# Patient Record
Sex: FEMALE | Race: WHITE | Employment: OTHER | URBAN - METROPOLITAN AREA
[De-identification: names, ages, dates, MRNs, and addresses within clinical notes are randomized per-mention and may not be internally consistent; named-entity substitution may affect disease eponyms.]

---

## 2017-03-27 ENCOUNTER — OFFICE VISIT (OUTPATIENT)
Dept: PHYSICAL THERAPY | Age: 72
End: 2017-03-27
Attending: ORTHOPAEDIC SURGERY
Payer: MEDICARE

## 2017-03-27 DIAGNOSIS — C40.22 MALIGNANT NEOPLASM OF LONG BONE OF LEFT LOWER EXTREMITY (HCC): Primary | ICD-10-CM

## 2017-03-27 PROCEDURE — 97163 PT EVAL HIGH COMPLEX 45 MIN: CPT | Performed by: PHYSICAL THERAPIST

## 2017-03-27 NOTE — PROGRESS NOTES
LOWER EXTREMITY EVALUATION:   Referring Physician: Dr. Abbie Moreira  Date of Onset: Dec 8 2016 hip replacement  Date of Service: 3/27/2017   L THR    PATIENT SUMMARY:   Arlyn Thapa is a 73year old y/o female who presents to therapy today with complaints of 0    DF: R 5; L 8  PF unable to assess R 4/5     PROM:  Hip Knee Foot/Ankle   Flexion: R 120; L 45  Extension: NT   Flexion: R 135; L 60  Extension: R +2; L 0    NT         Flexibility:   Hip Flexor: R NT, L NT  HS/Piriformis NT  Quads: L > R  Gastroc: L > questions, please contact me at Dept: [phone]    Sincerely,  Electronically signed by therapist: Hayley Crain PT    [de-identified] certification required: Yes  I certify the need for these services furnished under this plan of treatment and while un

## 2017-03-29 ENCOUNTER — OFFICE VISIT (OUTPATIENT)
Dept: PHYSICAL THERAPY | Age: 72
End: 2017-03-29
Attending: ORTHOPAEDIC SURGERY
Payer: MEDICARE

## 2017-03-29 PROCEDURE — 97110 THERAPEUTIC EXERCISES: CPT | Performed by: PHYSICAL THERAPIST

## 2017-03-29 NOTE — PROGRESS NOTES
L THR   Authorized # of Visits:  2         Next MD visit: none scheduled  Fall Risk: standard         Precautions: n/a           Medication Changes since last visit?: No  Subjective: Patient reports her knee feels better.   No problems with start of the exe

## 2017-03-30 ENCOUNTER — OFFICE VISIT (OUTPATIENT)
Dept: PHYSICAL THERAPY | Age: 72
End: 2017-03-30
Attending: ORTHOPAEDIC SURGERY
Payer: MEDICARE

## 2017-03-30 PROCEDURE — 97110 THERAPEUTIC EXERCISES: CPT | Performed by: PHYSICAL THERAPIST

## 2017-03-30 NOTE — PROGRESS NOTES
L THR   Authorized # of Visits:  3/10         Next MD visit: none scheduled  Fall Risk: standard         Precautions: n/a           Medication Changes since last visit?: No  Subjective: Patient reports increase in pain yesterday to 7-8/10.   States today 1-

## 2017-04-03 ENCOUNTER — OFFICE VISIT (OUTPATIENT)
Dept: PHYSICAL THERAPY | Age: 72
End: 2017-04-03
Attending: ORTHOPAEDIC SURGERY
Payer: MEDICARE

## 2017-04-03 PROCEDURE — 97110 THERAPEUTIC EXERCISES: CPT | Performed by: PHYSICAL THERAPIST

## 2017-04-03 NOTE — PROGRESS NOTES
L THR   Authorized # of Visits:  4/10         Next MD visit: none scheduled  Fall Risk: standard         Precautions: n/a           Medication Changes since last visit?: No  Subjective: Patient reports pain increase to 4/10 only after last session, today 2

## 2017-04-04 ENCOUNTER — OFFICE VISIT (OUTPATIENT)
Dept: PHYSICAL THERAPY | Age: 72
End: 2017-04-04
Attending: ORTHOPAEDIC SURGERY
Payer: MEDICARE

## 2017-04-04 PROCEDURE — 97110 THERAPEUTIC EXERCISES: CPT | Performed by: PHYSICAL THERAPIST

## 2017-04-04 NOTE — PROGRESS NOTES
L THR   Authorized # of Visits:  5/10         Next MD visit: none scheduled  Fall Risk: standard         Precautions: n/a           Medication Changes since last visit?: No  Subjective: Patient reports pain increase to 3-4/10 after last session.   States no

## 2017-04-06 ENCOUNTER — OFFICE VISIT (OUTPATIENT)
Dept: PHYSICAL THERAPY | Age: 72
End: 2017-04-06
Attending: ORTHOPAEDIC SURGERY
Payer: MEDICARE

## 2017-04-06 PROCEDURE — 97110 THERAPEUTIC EXERCISES: CPT | Performed by: PHYSICAL THERAPIST

## 2017-04-06 NOTE — PROGRESS NOTES
L THR   Authorized # of Visits:  6/10         Next MD visit: none scheduled  Fall Risk: standard         Precautions: n/a           Medication Changes since last visit?: No  Subjective: Patient reports stiffness this morning.            Objective:     3/30/

## 2017-04-11 ENCOUNTER — OFFICE VISIT (OUTPATIENT)
Dept: PHYSICAL THERAPY | Age: 72
End: 2017-04-11
Attending: ORTHOPAEDIC SURGERY
Payer: MEDICARE

## 2017-04-11 PROCEDURE — 97110 THERAPEUTIC EXERCISES: CPT | Performed by: PHYSICAL THERAPIST

## 2017-04-11 NOTE — PROGRESS NOTES
L THR   Authorized # of Visits:  7/10         Next MD visit: none scheduled  Fall Risk: standard         Precautions: n/a           Medication Changes since last visit?: No  Subjective: Patient reports did a little stretching on her back.             Object

## 2017-04-12 ENCOUNTER — OFFICE VISIT (OUTPATIENT)
Dept: PHYSICAL THERAPY | Age: 72
End: 2017-04-12
Attending: ORTHOPAEDIC SURGERY
Payer: MEDICARE

## 2017-04-12 PROCEDURE — 97110 THERAPEUTIC EXERCISES: CPT | Performed by: PHYSICAL THERAPIST

## 2017-04-12 NOTE — PROGRESS NOTES
L THR   Authorized # of Visits:  8/10         Next MD visit: none scheduled  Fall Risk: standard         Precautions: n/a           Medication Changes since last visit?: No  Subjective: Patient reports she had no pain sitting in the car after last session.

## 2017-04-14 ENCOUNTER — OFFICE VISIT (OUTPATIENT)
Dept: PHYSICAL THERAPY | Age: 72
End: 2017-04-14
Attending: ORTHOPAEDIC SURGERY
Payer: MEDICARE

## 2017-04-14 PROCEDURE — 97110 THERAPEUTIC EXERCISES: CPT | Performed by: PHYSICAL THERAPIST

## 2017-04-14 NOTE — PROGRESS NOTES
L THR   Authorized # of Visits:  9/10         Next MD visit: none scheduled  Fall Risk: standard         Precautions: n/a           Medication Changes since last visit?: No  Subjective: Patient reports she has been doing the exercises at home.            Ob

## 2017-04-17 ENCOUNTER — OFFICE VISIT (OUTPATIENT)
Dept: PHYSICAL THERAPY | Age: 72
End: 2017-04-17
Attending: ORTHOPAEDIC SURGERY
Payer: MEDICARE

## 2017-04-17 PROCEDURE — 97110 THERAPEUTIC EXERCISES: CPT | Performed by: PHYSICAL THERAPIST

## 2017-04-17 NOTE — PROGRESS NOTES
L THR   Authorized # of Visits:  10/10         Next MD visit: none scheduled  Fall Risk: standard         Precautions: n/a           Medication Changes since last visit?: No   10 visit Update:   Subjective: Patient reports easier to get in and out of car a strengthening, gait pattern    Charges: Ex 3       Total Timed Treatment: 45 min  Total Treatment Time: 45 min

## 2017-04-18 ENCOUNTER — OFFICE VISIT (OUTPATIENT)
Dept: PHYSICAL THERAPY | Age: 72
End: 2017-04-18
Attending: ORTHOPAEDIC SURGERY
Payer: MEDICARE

## 2017-04-18 PROCEDURE — 97110 THERAPEUTIC EXERCISES: CPT | Performed by: PHYSICAL THERAPIST

## 2017-04-18 NOTE — PROGRESS NOTES
L THR   Authorized # of Visits:  11/10         Next MD visit: none scheduled  Fall Risk: standard         Precautions: n/a           Medication Changes since last visit?: No     Subjective: Patient reports she was doing a lot of the exercises yesterday.

## 2017-04-20 ENCOUNTER — OFFICE VISIT (OUTPATIENT)
Dept: PHYSICAL THERAPY | Age: 72
End: 2017-04-20
Attending: ORTHOPAEDIC SURGERY
Payer: MEDICARE

## 2017-04-20 PROCEDURE — 97110 THERAPEUTIC EXERCISES: CPT | Performed by: PHYSICAL THERAPIST

## 2017-04-20 PROCEDURE — 97035 APP MDLTY 1+ULTRASOUND EA 15: CPT | Performed by: PHYSICAL THERAPIST

## 2017-04-20 NOTE — PROGRESS NOTES
L THR   Authorized # of Visits:  12/20         Next MD visit: none scheduled  Fall Risk: standard         Precautions: n/a           Medication Changes since last visit?: No     Subjective: Patient reports continues doing the exercises at home.         Dulce Paz

## 2017-04-24 ENCOUNTER — APPOINTMENT (OUTPATIENT)
Dept: PHYSICAL THERAPY | Age: 72
End: 2017-04-24
Attending: ORTHOPAEDIC SURGERY
Payer: MEDICARE

## 2017-04-26 ENCOUNTER — OFFICE VISIT (OUTPATIENT)
Dept: PHYSICAL THERAPY | Age: 72
End: 2017-04-26
Attending: ORTHOPAEDIC SURGERY
Payer: MEDICARE

## 2017-04-26 PROCEDURE — 97110 THERAPEUTIC EXERCISES: CPT | Performed by: PHYSICAL THERAPIST

## 2017-04-26 NOTE — PROGRESS NOTES
L THR   Authorized # of Visits:  13/20         Next MD visit: none scheduled  Fall Risk: standard         Precautions: n/a           Medication Changes since last visit?: No     Subjective: Patient reports some soreness today, seems like weather affects he

## 2017-04-27 ENCOUNTER — OFFICE VISIT (OUTPATIENT)
Dept: PHYSICAL THERAPY | Age: 72
End: 2017-04-27
Attending: ORTHOPAEDIC SURGERY
Payer: MEDICARE

## 2017-04-27 PROCEDURE — 97110 THERAPEUTIC EXERCISES: CPT | Performed by: PHYSICAL THERAPIST

## 2017-04-27 NOTE — PROGRESS NOTES
L THR   Authorized # of Visits:  13/20         Next MD visit: none scheduled  Fall Risk: standard         Precautions: n/a           Medication Changes since last visit?: No     Subjective: Patient reports some achiness today.        Objective:   PROM knee

## 2017-05-01 ENCOUNTER — OFFICE VISIT (OUTPATIENT)
Dept: PHYSICAL THERAPY | Age: 72
End: 2017-05-01
Attending: ORTHOPAEDIC SURGERY
Payer: MEDICARE

## 2017-05-01 PROCEDURE — 97110 THERAPEUTIC EXERCISES: CPT | Performed by: PHYSICAL THERAPIST

## 2017-05-01 NOTE — PROGRESS NOTES
L THR   Authorized # of Visits:  14/20         Next MD visit: none scheduled  Fall Risk: standard         Precautions: n/a           Medication Changes since last visit?: No     Subjective: Patient states her L hip is very tight today and says it may be du

## 2017-05-03 ENCOUNTER — OFFICE VISIT (OUTPATIENT)
Dept: PHYSICAL THERAPY | Age: 72
End: 2017-05-03
Attending: ORTHOPAEDIC SURGERY
Payer: MEDICARE

## 2017-05-03 PROCEDURE — 97110 THERAPEUTIC EXERCISES: CPT | Performed by: PHYSICAL THERAPIST

## 2017-05-03 NOTE — PROGRESS NOTES
L THR   Authorized # of Visits:  15/20         Next MD visit: none scheduled  Fall Risk: standard         Precautions: n/a           Medication Changes since last visit?: No     Subjective: Patient states the hip was painful just last night before bed at 8

## 2017-05-04 ENCOUNTER — OFFICE VISIT (OUTPATIENT)
Dept: PHYSICAL THERAPY | Age: 72
End: 2017-05-04
Attending: ORTHOPAEDIC SURGERY
Payer: MEDICARE

## 2017-05-04 PROCEDURE — 97110 THERAPEUTIC EXERCISES: CPT | Performed by: PHYSICAL THERAPIST

## 2017-05-04 NOTE — PROGRESS NOTES
L THR   Authorized # of Visits:  16/20         Next MD visit: none scheduled  Fall Risk: standard         Precautions: n/a           Medication Changes since last visit?: No     Subjective: Pt says her hip pain is mild today but hurts more in the front.

## 2017-05-11 ENCOUNTER — OFFICE VISIT (OUTPATIENT)
Dept: PHYSICAL THERAPY | Age: 72
End: 2017-05-11
Attending: ORTHOPAEDIC SURGERY
Payer: MEDICARE

## 2017-05-11 PROCEDURE — 97110 THERAPEUTIC EXERCISES: CPT

## 2017-05-11 NOTE — PROGRESS NOTES
L THR   Authorized # of Visits:  17/20         Next MD visit: 5/22/17   Fall Risk: standard         Precautions: n/a           Medication Changes since last visit?: No     Subjective: Pt says she felt her knee pop while doing exercises at home and felt her daily activities. Plan: Continue, advance as able and slowly increase amount of stretching and strengthening.      Charges: Ex 3      Total Timed Treatment: 45 min  Total Treatment Time: 60 min

## 2017-05-12 ENCOUNTER — OFFICE VISIT (OUTPATIENT)
Dept: PHYSICAL THERAPY | Age: 72
End: 2017-05-12
Attending: ORTHOPAEDIC SURGERY
Payer: MEDICARE

## 2017-05-12 PROCEDURE — 97110 THERAPEUTIC EXERCISES: CPT | Performed by: PHYSICAL THERAPIST

## 2017-05-12 NOTE — PROGRESS NOTES
L THR   Authorized # of Visits:  17/20         Next MD visit: none scheduled  Fall Risk: standard         Precautions: n/a           Medication Changes since last visit?: No     Subjective: Pt reports she was tired yesterday but today ready to go.      Kevin Suarez

## 2017-05-15 ENCOUNTER — OFFICE VISIT (OUTPATIENT)
Dept: PHYSICAL THERAPY | Age: 72
End: 2017-05-15
Attending: ORTHOPAEDIC SURGERY
Payer: MEDICARE

## 2017-05-15 PROCEDURE — 97110 THERAPEUTIC EXERCISES: CPT | Performed by: PHYSICAL THERAPIST

## 2017-05-15 NOTE — PROGRESS NOTES
L THR   Authorized # of Visits:  18/20         Next MD visit: none scheduled  Fall Risk: standard         Precautions: n/a           Medication Changes since last visit?: No     Subjective: Pt reports she fell yesterday as she was kicking off her shoe.  She Total Timed Treatment: 55 min  Total Treatment Time: 60 min

## 2017-05-18 ENCOUNTER — OFFICE VISIT (OUTPATIENT)
Dept: PHYSICAL THERAPY | Age: 72
End: 2017-05-18
Attending: ORTHOPAEDIC SURGERY
Payer: MEDICARE

## 2017-05-18 PROCEDURE — 97110 THERAPEUTIC EXERCISES: CPT | Performed by: PHYSICAL THERAPIST

## 2017-05-18 NOTE — PROGRESS NOTES
Patient Name: Fly De Anda, : 10/10/1945, MRN: [id number]   Date:  2017  Referring Physician:  Orville Galeas    Diagnosis: Hip replacement  Progress Summary    L THR   Authorized # of Visits:           Next MD visit: none scheduled  Fall • Therapy Goals      1. Patient independent with HEP- MET  2. Patient to have full ROM L to R knee to improve gait pattern- Improved ROM  3. Patient to have 90 deg hip flexion AROM. Improved  4. Patient to walk with use of cane or no assistive device.  -

## 2017-05-23 ENCOUNTER — OFFICE VISIT (OUTPATIENT)
Dept: PHYSICAL THERAPY | Age: 72
End: 2017-05-23
Attending: ORTHOPAEDIC SURGERY
Payer: MEDICARE

## 2017-05-23 PROCEDURE — 97110 THERAPEUTIC EXERCISES: CPT

## 2017-05-23 NOTE — PROGRESS NOTES
L THR   Authorized # of Visits:  19/20         Next MD visit: none scheduled  Fall Risk: standard         Precautions: n/a           Medication Changes since last visit?: No     Subjective: Pt reports the physician said everything is progressing as expecte

## 2017-05-24 ENCOUNTER — APPOINTMENT (OUTPATIENT)
Dept: PHYSICAL THERAPY | Age: 72
End: 2017-05-24
Attending: ORTHOPAEDIC SURGERY
Payer: MEDICARE

## 2017-05-26 ENCOUNTER — OFFICE VISIT (OUTPATIENT)
Dept: PHYSICAL THERAPY | Age: 72
End: 2017-05-26
Attending: ORTHOPAEDIC SURGERY
Payer: MEDICARE

## 2017-05-26 PROCEDURE — 97110 THERAPEUTIC EXERCISES: CPT | Performed by: PHYSICAL THERAPIST

## 2017-05-26 NOTE — PROGRESS NOTES
L THR   Authorized # of Visits:  20/20         Next MD visit: none scheduled  Fall Risk: standard         Precautions: n/a           Medication Changes since last visit?: No   10 visit update:     Subjective: Patient reports she is now able to sit on toile pattern  3. Patient to have 90 deg hip flexion AROM. 4. Patient to walk with use of cane or no assistive device. 5. Patient to report no pain with daily activities.             Plan: Continue with ROM, strengthening, gait work, balance , advance HEP as

## 2017-05-30 ENCOUNTER — OFFICE VISIT (OUTPATIENT)
Dept: PHYSICAL THERAPY | Age: 72
End: 2017-05-30
Attending: ORTHOPAEDIC SURGERY
Payer: MEDICARE

## 2017-05-30 PROCEDURE — 97110 THERAPEUTIC EXERCISES: CPT | Performed by: PHYSICAL THERAPIST

## 2017-05-30 PROCEDURE — 97140 MANUAL THERAPY 1/> REGIONS: CPT | Performed by: PHYSICAL THERAPIST

## 2017-05-30 NOTE — PROGRESS NOTES
L THR   Authorized # of Visits:  21         Next MD visit: none scheduled  Fall Risk: standard         Precautions: n/a           Medication Changes since last visit?: No   10 visit update:     Subjective: Patient reports she is now able to get into the fr able,       Charges:  TherEx 2, MM1     Total Timed Treatment: 45 min  Total Treatment Time: 45 min

## 2017-05-31 ENCOUNTER — APPOINTMENT (OUTPATIENT)
Dept: PHYSICAL THERAPY | Age: 72
End: 2017-05-31
Attending: ORTHOPAEDIC SURGERY
Payer: MEDICARE

## 2017-06-01 ENCOUNTER — OFFICE VISIT (OUTPATIENT)
Dept: PHYSICAL THERAPY | Age: 72
End: 2017-06-01
Attending: ORTHOPAEDIC SURGERY
Payer: MEDICARE

## 2017-06-01 PROCEDURE — 97140 MANUAL THERAPY 1/> REGIONS: CPT | Performed by: PHYSICAL THERAPIST

## 2017-06-01 PROCEDURE — 97110 THERAPEUTIC EXERCISES: CPT | Performed by: PHYSICAL THERAPIST

## 2017-06-06 ENCOUNTER — OFFICE VISIT (OUTPATIENT)
Dept: PHYSICAL THERAPY | Age: 72
End: 2017-06-06
Attending: ORTHOPAEDIC SURGERY
Payer: MEDICARE

## 2017-06-06 PROCEDURE — 97110 THERAPEUTIC EXERCISES: CPT

## 2017-06-06 NOTE — PROGRESS NOTES
Dx:HENRY Dec 8.2016                    Next MD visit: none scheduled  Fall Risk: standard         Precautions: n/a           Medications: Yes/no: no   Subjective: Pt states that she was sore after the last visits, more of soreness as she feels like she did independent with HEP  2. Patient to have full ROM L to R knee to improve gait pattern  3. Patient to have 90 deg hip flexion AROM. 4. Patient to walk with use of cane or no assistive device. 5. Patient to report no pain with daily activities.

## 2017-06-09 ENCOUNTER — OFFICE VISIT (OUTPATIENT)
Dept: PHYSICAL THERAPY | Age: 72
End: 2017-06-09
Attending: ORTHOPAEDIC SURGERY
Payer: MEDICARE

## 2017-06-09 PROCEDURE — 97110 THERAPEUTIC EXERCISES: CPT

## 2017-06-09 NOTE — PROGRESS NOTES
Dx:LTJOSE Dec 8.2016                    Next MD visit: none scheduled  Fall Risk: standard         Precautions: n/a           Medications: Yes/no: no   Subjective: Pt states that still has soreness on the hip but nothing unusaul.  She continues to report doin flex hip and knee for efficiency of transfers. She also has decreased overall /global strength on LLE and still depends on SC due to gait devitaions.  Sitting still asymmetrical as she tends not to WB evenly on BL gluteal area  Goals     • Therapy Goals

## 2017-06-13 ENCOUNTER — OFFICE VISIT (OUTPATIENT)
Dept: PHYSICAL THERAPY | Age: 72
End: 2017-06-13
Attending: ORTHOPAEDIC SURGERY
Payer: MEDICARE

## 2017-06-13 PROCEDURE — 97110 THERAPEUTIC EXERCISES: CPT

## 2017-06-13 NOTE — PROGRESS NOTES
Dx:HENRY Dec 8.2016                    Next MD visit: none scheduled  Fall Risk: standard         Precautions: n/a           Medications: Yes/no: no   Subjective: Pt states that she feels the same since last session. She relates that she has been trying to s Bhip encouraged on L side with assistance  Gait discussion Seated with WB on LLE, knee flexion     Seated knee flexion via CR technique  minisquats 2x10: hip hinge      Reviewed HEP Sit to stand transfers with hip hinge        Assessment: Pt has the follow

## 2017-06-16 ENCOUNTER — OFFICE VISIT (OUTPATIENT)
Dept: PHYSICAL THERAPY | Age: 72
End: 2017-06-16
Attending: ORTHOPAEDIC SURGERY
Payer: MEDICARE

## 2017-06-16 PROCEDURE — 97110 THERAPEUTIC EXERCISES: CPT

## 2017-06-16 NOTE — PROGRESS NOTES
Dx:HENRY Dec 8.2016                    Next MD visit: none scheduled  Fall Risk: standard         Precautions: n/a           Medications: Yes/no: no   Subjective: Pt states that her swelling has been better as they continue to elevate.  She states that when PT measurement but less tightness at end feel SLR x10 with QS ,PT assistance to complete available range,     SLR x2x10 with assist to increased and achieve range avaliable SLR with assistance to  Reach available ROM for hip flexion    S/L hip abd PT yaz Marcy Hinson stress for further recruitment with YTB. Tried RTB but with poor form noted. Decraesed ROM on hip from primary PT's measurements, will continue to work on this      Goals     • Therapy Goals      1. Patient independent with HEP  2.  Patient to have full RO

## 2017-06-20 ENCOUNTER — OFFICE VISIT (OUTPATIENT)
Dept: PHYSICAL THERAPY | Age: 72
End: 2017-06-20
Attending: ORTHOPAEDIC SURGERY
Payer: MEDICARE

## 2017-06-20 PROCEDURE — 97110 THERAPEUTIC EXERCISES: CPT

## 2017-06-20 NOTE — PROGRESS NOTES
Dx:HENRY Dec 8.2016                    Next MD visit: none scheduled  Fall Risk: standard         Precautions: n/a           Medications: Yes/no: no   Subjective:  Pt relates that swelling has been really goo the past week and this has helped a lot but her 2x10 then with contract relax x10  Prone hip extension 2x10   SLR x2x10 with assist to increased and achieve range avaliable SLR with assistance to  Reach available ROM for hip flexion    S/L hip abd PT assisted 2x10 In seating : sitting balance symmetrica have full ROM L to R knee to improve gait pattern  3. Patient to have 90 deg hip flexion AROM. 4. Patient to walk with use of cane or no assistive device. 5. Patient to report no pain with daily activities.           Education done/HEP given: reviewed H

## 2017-06-22 ENCOUNTER — OFFICE VISIT (OUTPATIENT)
Dept: PHYSICAL THERAPY | Age: 72
End: 2017-06-22
Attending: ORTHOPAEDIC SURGERY
Payer: MEDICARE

## 2017-06-22 PROCEDURE — 97110 THERAPEUTIC EXERCISES: CPT

## 2017-06-22 NOTE — PROGRESS NOTES
Dx:LTHR Dec 8.2016                    Next MD visit: none scheduled  Fall Risk: standard         Precautions: n/a           Medications: Yes/no: no   Subjective:  Pt relates she woke up one day and felt better on her hip, pain is down half. SHe reports that technique as pt tends to tiptoe on the R side SLR x10 with Qs ,PT assistance to complete available range, decreased from primary PT measurement but less tightness at end feel SLR x10 with QS ,PT assistance to complete available range,  Prone knee flexion 2 is able to WB on L side. She still has tendency to keep LLE straight with sit to stand transfers but better with vercal and tactile cues. She circumducts to step on 6\" otherwise requires assistance and cues. Pain increase with above exercises manageable  L

## 2017-06-26 ENCOUNTER — OFFICE VISIT (OUTPATIENT)
Dept: PHYSICAL THERAPY | Age: 72
End: 2017-06-26
Attending: ORTHOPAEDIC SURGERY
Payer: MEDICARE

## 2017-06-26 PROCEDURE — 97110 THERAPEUTIC EXERCISES: CPT | Performed by: PHYSICAL THERAPIST

## 2017-06-26 NOTE — PROGRESS NOTES
Dx:HENRY Dec 8.2016                    Next MD visit: none scheduled  Fall Risk: standard         Precautions: n/a           Medications: Yes/no: no   Subjective:  Patient reports continues with the exercises at home.     Pain level: 3/10 with movements    O increased form 55 AAROM to 70 knee flexion Supine knee flexion S?L hip abd assisted to work against gravity 2x10     Gait training Step lunges with corrected technique as pt tends to tiptoe on the R side SLR x10 with Qs ,PT assistance to complete available especially of L hip abductors for improved stability. Weakness affecting ability to walk without use of assistive device for longer distances. Substitution pattern of lateral lean during gait. Goals     • Therapy Goals            1.  Patient independe

## 2017-06-28 ENCOUNTER — OFFICE VISIT (OUTPATIENT)
Dept: PHYSICAL THERAPY | Age: 72
End: 2017-06-28
Attending: ORTHOPAEDIC SURGERY
Payer: MEDICARE

## 2017-06-28 PROCEDURE — 97110 THERAPEUTIC EXERCISES: CPT | Performed by: PHYSICAL THERAPIST

## 2017-06-28 NOTE — PROGRESS NOTES
Dx:HENRY Dec 8.2016                    Next MD visit: none scheduled  Fall Risk: standard         Precautions: n/a           Medications: Yes/no: no   Subjective:  Patient reports continues with the exercises at home.     Pain level: 3/10 with movements    O 10  Step tap with R foot x 15 for L hip stabilization, marches x 15, sit to stand chair (air ex pad on chair) x 10  Power tower x 15 knee/hip flex /ext   Shuttle leg press 5B x 15 double Seated knee flexion active x 15  Seated knee flexion stretch x 10  Se sideways with resistance in bar      minisquats with emphasis on hip [id number] Mini squats with emphasis on symmetrical WB and hip hinge      Gait discussion                    Assessment: Added weight shifts standing to single leg stance, added single le

## 2017-07-03 ENCOUNTER — OFFICE VISIT (OUTPATIENT)
Dept: PHYSICAL THERAPY | Age: 72
End: 2017-07-03
Attending: FAMILY MEDICINE
Payer: MEDICARE

## 2017-07-03 PROCEDURE — 97110 THERAPEUTIC EXERCISES: CPT | Performed by: PHYSICAL THERAPIST

## 2017-07-03 NOTE — PROGRESS NOTES
Dx:HENRY Dec 8.2016                    Next MD visit: none scheduled  Fall Risk: standard         Precautions: n/a           Medications: Yes/no: no   Subjective:  Patient reports continues with the exercises at home.   States was sitting and standing a lot straight. PROM knee 70, Hip flex 70. Shuttle leg press double 2 x 15 5B, single 2 x 10 4B  Standing hip abd with YTB 2 x 10, hip ext 2 x 10 YTB.   Marches x 25, sit to stand chair x 10   Power tower squats single/double x 15 each  Standing heel raises 2 then sideways with resistance in bar       Mini squats with emphasis on symmetrical WB and hip hinge                          Assessment: Continuing to work to promote hip and knee flexion.   Working on ongoing hip strengthening as abductors continue to be

## 2017-07-12 ENCOUNTER — OFFICE VISIT (OUTPATIENT)
Dept: PHYSICAL THERAPY | Age: 72
End: 2017-07-12
Attending: FAMILY MEDICINE
Payer: MEDICARE

## 2017-07-12 PROCEDURE — 97110 THERAPEUTIC EXERCISES: CPT | Performed by: PHYSICAL THERAPIST

## 2017-07-12 NOTE — PROGRESS NOTES
Dx:HENRY Dec 8.2016                    Next MD visit: none scheduled  Fall Risk: standard         Precautions: n/a           Medications: Yes/no: no   Subjective:  Patient reports she Is continuing to be able to cut down use of TRAMADOL as pain is decreasin

## 2017-07-19 ENCOUNTER — OFFICE VISIT (OUTPATIENT)
Dept: PHYSICAL THERAPY | Age: 72
End: 2017-07-19
Attending: FAMILY MEDICINE
Payer: MEDICARE

## 2017-07-19 PROCEDURE — 97110 THERAPEUTIC EXERCISES: CPT | Performed by: PHYSICAL THERAPIST

## 2017-07-19 NOTE — PROGRESS NOTES
Dx:LTJOSE Dec 8.2016                    Next MD visit: none scheduled  Fall Risk: standard         Precautions: n/a           Medications: Yes/no: no   Subjective:  Patient reports increased swelling noted at leg and pain upper leg in last 2 days.   States wi activities. Plan: Continue with ROM, strengthening, balance, gait progression    Charges:  Theraex x3       Total Timed Treatment: 50 min  Total Treatment Time: 50 min

## 2017-07-26 ENCOUNTER — OFFICE VISIT (OUTPATIENT)
Dept: PHYSICAL THERAPY | Age: 72
End: 2017-07-26
Attending: FAMILY MEDICINE
Payer: MEDICARE

## 2017-07-26 PROCEDURE — 97110 THERAPEUTIC EXERCISES: CPT | Performed by: PHYSICAL THERAPIST

## 2017-07-26 NOTE — PROGRESS NOTES
Dx:LTHR Dec 8.2016                    Next MD visit: none scheduled  Fall Risk: standard         Precautions: n/a           Medications: Yes/no: no   Subjective:  Patient reports she did see MD and she is going to have surgery on her knee to remove scar ti

## 2017-08-10 ENCOUNTER — TELEPHONE (OUTPATIENT)
Dept: PHYSICAL THERAPY | Age: 72
End: 2017-08-10

## 2017-08-14 ENCOUNTER — APPOINTMENT (OUTPATIENT)
Dept: PHYSICAL THERAPY | Age: 72
End: 2017-08-14
Attending: ORTHOPAEDIC SURGERY
Payer: MEDICARE

## 2017-08-16 ENCOUNTER — APPOINTMENT (OUTPATIENT)
Dept: PHYSICAL THERAPY | Age: 72
End: 2017-08-16
Attending: ORTHOPAEDIC SURGERY
Payer: MEDICARE

## 2017-08-18 ENCOUNTER — APPOINTMENT (OUTPATIENT)
Dept: PHYSICAL THERAPY | Age: 72
End: 2017-08-18
Attending: ORTHOPAEDIC SURGERY
Payer: MEDICARE

## 2017-08-21 ENCOUNTER — APPOINTMENT (OUTPATIENT)
Dept: PHYSICAL THERAPY | Age: 72
End: 2017-08-21
Attending: ORTHOPAEDIC SURGERY
Payer: MEDICARE

## 2017-08-23 ENCOUNTER — APPOINTMENT (OUTPATIENT)
Dept: PHYSICAL THERAPY | Age: 72
End: 2017-08-23
Attending: ORTHOPAEDIC SURGERY
Payer: MEDICARE

## 2017-08-25 ENCOUNTER — APPOINTMENT (OUTPATIENT)
Dept: PHYSICAL THERAPY | Age: 72
End: 2017-08-25
Attending: ORTHOPAEDIC SURGERY
Payer: MEDICARE

## 2017-08-28 ENCOUNTER — APPOINTMENT (OUTPATIENT)
Dept: PHYSICAL THERAPY | Age: 72
End: 2017-08-28
Attending: ORTHOPAEDIC SURGERY
Payer: MEDICARE

## 2017-08-30 ENCOUNTER — APPOINTMENT (OUTPATIENT)
Dept: PHYSICAL THERAPY | Age: 72
End: 2017-08-30
Attending: ORTHOPAEDIC SURGERY
Payer: MEDICARE

## 2017-09-01 ENCOUNTER — APPOINTMENT (OUTPATIENT)
Dept: PHYSICAL THERAPY | Age: 72
End: 2017-09-01
Attending: ORTHOPAEDIC SURGERY
Payer: MEDICARE

## 2017-09-05 ENCOUNTER — APPOINTMENT (OUTPATIENT)
Dept: PHYSICAL THERAPY | Age: 72
End: 2017-09-05
Attending: ORTHOPAEDIC SURGERY
Payer: MEDICARE

## 2017-09-08 ENCOUNTER — APPOINTMENT (OUTPATIENT)
Dept: PHYSICAL THERAPY | Age: 72
End: 2017-09-08
Attending: ORTHOPAEDIC SURGERY
Payer: MEDICARE

## 2017-09-11 ENCOUNTER — APPOINTMENT (OUTPATIENT)
Dept: PHYSICAL THERAPY | Age: 72
End: 2017-09-11
Attending: ORTHOPAEDIC SURGERY
Payer: MEDICARE

## 2017-09-13 ENCOUNTER — APPOINTMENT (OUTPATIENT)
Dept: PHYSICAL THERAPY | Age: 72
End: 2017-09-13
Attending: ORTHOPAEDIC SURGERY
Payer: MEDICARE

## 2017-09-15 ENCOUNTER — APPOINTMENT (OUTPATIENT)
Dept: PHYSICAL THERAPY | Age: 72
End: 2017-09-15
Attending: ORTHOPAEDIC SURGERY
Payer: MEDICARE

## 2017-09-19 ENCOUNTER — APPOINTMENT (OUTPATIENT)
Dept: PHYSICAL THERAPY | Age: 72
End: 2017-09-19
Attending: ORTHOPAEDIC SURGERY
Payer: MEDICARE

## 2017-09-21 ENCOUNTER — APPOINTMENT (OUTPATIENT)
Dept: PHYSICAL THERAPY | Age: 72
End: 2017-09-21
Attending: ORTHOPAEDIC SURGERY
Payer: MEDICARE

## 2017-09-26 ENCOUNTER — APPOINTMENT (OUTPATIENT)
Dept: PHYSICAL THERAPY | Age: 72
End: 2017-09-26
Attending: ORTHOPAEDIC SURGERY
Payer: MEDICARE

## 2017-09-28 ENCOUNTER — APPOINTMENT (OUTPATIENT)
Dept: PHYSICAL THERAPY | Age: 72
End: 2017-09-28
Attending: ORTHOPAEDIC SURGERY
Payer: MEDICARE

## 2018-02-21 ENCOUNTER — APPOINTMENT (OUTPATIENT)
Dept: PHYSICAL THERAPY | Age: 73
End: 2018-02-21
Attending: ORTHOPAEDIC SURGERY
Payer: MEDICARE

## 2018-02-23 ENCOUNTER — APPOINTMENT (OUTPATIENT)
Dept: PHYSICAL THERAPY | Age: 73
End: 2018-02-23
Attending: ORTHOPAEDIC SURGERY
Payer: MEDICARE

## 2018-02-26 ENCOUNTER — OFFICE VISIT (OUTPATIENT)
Dept: PHYSICAL THERAPY | Age: 73
End: 2018-02-26
Attending: ORTHOPAEDIC SURGERY
Payer: MEDICARE

## 2018-02-26 DIAGNOSIS — C40.22 MALIGNANT NEOPLASM OF LONG BONE OF LEFT LOWER EXTREMITY (HCC): ICD-10-CM

## 2018-02-26 PROCEDURE — 97162 PT EVAL MOD COMPLEX 30 MIN: CPT | Performed by: PHYSICAL THERAPIST

## 2018-02-26 NOTE — PROGRESS NOTES
LOWER EXTREMITY EVALUATION:   Referring Physician: Dr. Estrada Dear  Date of Onset: Dec 8, 2016 Date of Service: 2/26/2018   Diagnosis: malignant long bone tumor, hip surgery with 2 subsequent dislocation and use of brace.     PATIENT SUMMARY:   Marshal Buck i Foot/Ankle   Flexion: R 110; L25  Extension: R 8; L 8  Abduction: R 25; L 40   Flexion: R 130; L 50  Extension: R 0; L 0    WNL     PROM:  Hip Knee   Flexion: R 1115; L 60   Flexion: R WNL; L 55  Extension: R 0; L 0                Strength/MMT:   Hip Knee Please co-sign or sign and return this letter via fax as soon as possible to [phone].  If you have any questions, please contact me at Dept: [phone]    Sincerely,  Electronically signed by therapist: Mindi Crowell PT    Physician's certificati

## 2018-02-28 ENCOUNTER — OFFICE VISIT (OUTPATIENT)
Dept: PHYSICAL THERAPY | Age: 73
End: 2018-02-28
Attending: ORTHOPAEDIC SURGERY
Payer: MEDICARE

## 2018-02-28 PROCEDURE — 97110 THERAPEUTIC EXERCISES: CPT | Performed by: PHYSICAL THERAPIST

## 2018-02-28 NOTE — PROGRESS NOTES
Diagnosis: L hip/knee stiffness  Authorized # of Visits:  2         Next MD visit: none scheduled  Fall Risk: standard         Precautions: n/a           Medication Changes since last visit?: No  Subjective: Patient states started back with exercises at [facility]

## 2018-03-02 ENCOUNTER — OFFICE VISIT (OUTPATIENT)
Dept: PHYSICAL THERAPY | Age: 73
End: 2018-03-02
Attending: ORTHOPAEDIC SURGERY
Payer: MEDICARE

## 2018-03-02 PROCEDURE — 97140 MANUAL THERAPY 1/> REGIONS: CPT | Performed by: PHYSICAL THERAPIST

## 2018-03-02 PROCEDURE — 97110 THERAPEUTIC EXERCISES: CPT | Performed by: PHYSICAL THERAPIST

## 2018-03-02 NOTE — PROGRESS NOTES
Diagnosis: L hip/knee stiffness  Authorized # of Visits:  3         Next MD visit: none scheduled  Fall Risk: standard         Precautions: n/a           Medication Changes since last visit?: No  Subjective: Patient states some slight soreness at hip.

## 2018-03-05 ENCOUNTER — OFFICE VISIT (OUTPATIENT)
Dept: PHYSICAL THERAPY | Age: 73
End: 2018-03-05
Attending: ORTHOPAEDIC SURGERY
Payer: MEDICARE

## 2018-03-05 PROCEDURE — 97140 MANUAL THERAPY 1/> REGIONS: CPT | Performed by: PHYSICAL THERAPIST

## 2018-03-05 PROCEDURE — 97110 THERAPEUTIC EXERCISES: CPT | Performed by: PHYSICAL THERAPIST

## 2018-03-05 NOTE — PROGRESS NOTES
Diagnosis: L hip/knee stiffness  Authorized # of Visits:  4         Next MD visit: none scheduled  Fall Risk: standard         Precautions: n/a           Medication Changes since last visit?: No  Subjective: Patient states the hip is only a little sore

## 2018-03-07 ENCOUNTER — OFFICE VISIT (OUTPATIENT)
Dept: PHYSICAL THERAPY | Age: 73
End: 2018-03-07
Attending: ORTHOPAEDIC SURGERY
Payer: MEDICARE

## 2018-03-07 PROCEDURE — 97140 MANUAL THERAPY 1/> REGIONS: CPT | Performed by: PHYSICAL THERAPIST

## 2018-03-07 PROCEDURE — 97110 THERAPEUTIC EXERCISES: CPT | Performed by: PHYSICAL THERAPIST

## 2018-03-07 NOTE — PROGRESS NOTES
Diagnosis: L hip/knee stiffness  Authorized # of Visits:  5         Next MD visit: none scheduled  Fall Risk: standard         Precautions: n/a           Medication Changes since last visit?: No  Subjective: Patient states the hip is only a little sore

## 2018-03-09 ENCOUNTER — OFFICE VISIT (OUTPATIENT)
Dept: PHYSICAL THERAPY | Age: 73
End: 2018-03-09
Attending: ORTHOPAEDIC SURGERY
Payer: MEDICARE

## 2018-03-09 PROCEDURE — 97110 THERAPEUTIC EXERCISES: CPT | Performed by: PHYSICAL THERAPIST

## 2018-03-09 PROCEDURE — 97140 MANUAL THERAPY 1/> REGIONS: CPT | Performed by: PHYSICAL THERAPIST

## 2018-03-09 NOTE — PROGRESS NOTES
Diagnosis: L hip/knee stiffness  Authorized # of Visits:  5         Next MD visit: none scheduled  Fall Risk: standard         Precautions: n/a           Medication Changes since last visit?: No  Subjective: Patient states the leg was sore yesterday.

## 2018-03-12 ENCOUNTER — OFFICE VISIT (OUTPATIENT)
Dept: PHYSICAL THERAPY | Age: 73
End: 2018-03-12
Attending: ORTHOPAEDIC SURGERY
Payer: MEDICARE

## 2018-03-12 PROCEDURE — 97110 THERAPEUTIC EXERCISES: CPT | Performed by: PHYSICAL THERAPIST

## 2018-03-12 PROCEDURE — 97140 MANUAL THERAPY 1/> REGIONS: CPT | Performed by: PHYSICAL THERAPIST

## 2018-03-12 NOTE — PROGRESS NOTES
Diagnosis: L hip/knee stiffness  Authorized # of Visits:  6         Next MD visit: none scheduled  Fall Risk: standard         Precautions: n/a           Medication Changes since last visit?: No  Subjective: Patient states the leg not too bad over weekend.

## 2018-03-14 ENCOUNTER — OFFICE VISIT (OUTPATIENT)
Dept: PHYSICAL THERAPY | Age: 73
End: 2018-03-14
Attending: ORTHOPAEDIC SURGERY
Payer: MEDICARE

## 2018-03-14 PROCEDURE — 97140 MANUAL THERAPY 1/> REGIONS: CPT | Performed by: PHYSICAL THERAPIST

## 2018-03-14 PROCEDURE — 97110 THERAPEUTIC EXERCISES: CPT | Performed by: PHYSICAL THERAPIST

## 2018-03-14 NOTE — PROGRESS NOTES
Diagnosis: L hip/knee stiffness  Authorized # of Visits:  8        Next MD visit: none scheduled  Fall Risk: standard         Precautions: n/a           Medication Changes since last visit?: No  Subjective: Patient reports able to lean forward at hip easie

## 2018-03-15 ENCOUNTER — OFFICE VISIT (OUTPATIENT)
Dept: PHYSICAL THERAPY | Age: 73
End: 2018-03-15
Attending: ORTHOPAEDIC SURGERY
Payer: MEDICARE

## 2018-03-15 PROCEDURE — 97140 MANUAL THERAPY 1/> REGIONS: CPT

## 2018-03-15 PROCEDURE — 97110 THERAPEUTIC EXERCISES: CPT

## 2018-03-15 NOTE — PROGRESS NOTES
Diagnosis: L hip/knee stiffness  Authorized # of Visits:  8        Next MD visit: none scheduled  Fall Risk: standard         Precautions: n/a           Medication Changes since last visit?: No  Subjective: Patient reports having some mm soreness with some

## 2018-03-19 ENCOUNTER — OFFICE VISIT (OUTPATIENT)
Dept: PHYSICAL THERAPY | Age: 73
End: 2018-03-19
Attending: ORTHOPAEDIC SURGERY
Payer: MEDICARE

## 2018-03-19 PROCEDURE — 97110 THERAPEUTIC EXERCISES: CPT | Performed by: PHYSICAL THERAPIST

## 2018-03-19 NOTE — PROGRESS NOTES
Diagnosis: L hip/knee stiffness  Authorized # of Visits:  8        Next MD visit: none scheduled  Fall Risk: standard         Precautions: n/a           Medication Changes since last visit?: No  Subjective: Patient reports some mm soreness over the weekend

## 2018-03-21 ENCOUNTER — OFFICE VISIT (OUTPATIENT)
Dept: PHYSICAL THERAPY | Age: 73
End: 2018-03-21
Attending: ORTHOPAEDIC SURGERY
Payer: MEDICARE

## 2018-03-21 PROCEDURE — 97110 THERAPEUTIC EXERCISES: CPT | Performed by: PHYSICAL THERAPIST

## 2018-03-21 NOTE — PROGRESS NOTES
Diagnosis: L hip/knee stiffness  Authorized # of Visits:  9       Next MD visit: none scheduled  Fall Risk: standard         Precautions: n/a           Medication Changes since last visit?: No  Subjective: Patient reports pain 1-2/10 L hip.       Objective:

## 2018-03-23 ENCOUNTER — APPOINTMENT (OUTPATIENT)
Dept: PHYSICAL THERAPY | Age: 73
End: 2018-03-23
Attending: ORTHOPAEDIC SURGERY
Payer: MEDICARE

## 2018-03-26 ENCOUNTER — OFFICE VISIT (OUTPATIENT)
Dept: PHYSICAL THERAPY | Age: 73
End: 2018-03-26
Attending: ORTHOPAEDIC SURGERY
Payer: MEDICARE

## 2018-03-26 PROCEDURE — 97110 THERAPEUTIC EXERCISES: CPT | Performed by: PHYSICAL THERAPIST

## 2018-03-26 NOTE — PROGRESS NOTES
Diagnosis: L hip/knee stiffness  Authorized # of Visits:  10       Next MD visit: none scheduled  Fall Risk: standard         Precautions: n/a           Medication Changes since last visit?: No  Subjective: Patient reports pain 1-2/10 L hip.       Objective

## 2018-03-28 ENCOUNTER — OFFICE VISIT (OUTPATIENT)
Dept: PHYSICAL THERAPY | Age: 73
End: 2018-03-28
Attending: ORTHOPAEDIC SURGERY
Payer: MEDICARE

## 2018-03-28 PROCEDURE — 97110 THERAPEUTIC EXERCISES: CPT | Performed by: PHYSICAL THERAPIST

## 2018-03-28 NOTE — PROGRESS NOTES
Diagnosis: L hip/knee stiffness  Authorized # of Visits:  10       Next MD visit: none scheduled  Fall Risk: standard         Precautions: n/a           Medication Changes since last visit?: No   10 Visit update  Subjective: Patient reports pain mild at hi Continue, advance as able. Continuing ongoing strengthening, stabilization work, gait work.      Charges: Ex 3       Total Timed Treatment: 45 min  Total Treatment Time: 45 min

## 2018-03-30 ENCOUNTER — APPOINTMENT (OUTPATIENT)
Dept: PHYSICAL THERAPY | Age: 73
End: 2018-03-30
Attending: ORTHOPAEDIC SURGERY
Payer: MEDICARE

## 2018-03-30 ENCOUNTER — TELEPHONE (OUTPATIENT)
Dept: PHYSICAL THERAPY | Age: 73
End: 2018-03-30

## 2018-04-02 ENCOUNTER — OFFICE VISIT (OUTPATIENT)
Dept: PHYSICAL THERAPY | Age: 73
End: 2018-04-02
Attending: ORTHOPAEDIC SURGERY
Payer: MEDICARE

## 2018-04-02 PROCEDURE — 97140 MANUAL THERAPY 1/> REGIONS: CPT | Performed by: PHYSICAL THERAPIST

## 2018-04-02 PROCEDURE — 97110 THERAPEUTIC EXERCISES: CPT | Performed by: PHYSICAL THERAPIST

## 2018-04-02 NOTE — PROGRESS NOTES
Diagnosis: L hip/knee stiffness  Authorized # of Visits:  11      Next MD visit: none scheduled  Fall Risk: standard         Precautions: n/a           Medication Changes since last visit?: No     Subjective: Patient reports she did get the brace for the k

## 2018-04-04 ENCOUNTER — OFFICE VISIT (OUTPATIENT)
Dept: PHYSICAL THERAPY | Age: 73
End: 2018-04-04
Attending: ORTHOPAEDIC SURGERY
Payer: MEDICARE

## 2018-04-04 PROCEDURE — 97110 THERAPEUTIC EXERCISES: CPT | Performed by: PHYSICAL THERAPIST

## 2018-04-04 PROCEDURE — 97140 MANUAL THERAPY 1/> REGIONS: CPT | Performed by: PHYSICAL THERAPIST

## 2018-04-10 ENCOUNTER — OFFICE VISIT (OUTPATIENT)
Dept: PHYSICAL THERAPY | Age: 73
End: 2018-04-10
Attending: ORTHOPAEDIC SURGERY
Payer: MEDICARE

## 2018-04-10 PROCEDURE — 97110 THERAPEUTIC EXERCISES: CPT

## 2018-04-10 NOTE — PROGRESS NOTES
Diagnosis: L hip/knee stiffness  Authorized # of Visits:  12      Next MD visit: none scheduled  Fall Risk: standard         Precautions: n/a           Medication Changes since last visit?: No     Subjective: Patient reports she is  Wearing her knee brace

## 2018-04-12 ENCOUNTER — OFFICE VISIT (OUTPATIENT)
Dept: PHYSICAL THERAPY | Age: 73
End: 2018-04-12
Attending: ORTHOPAEDIC SURGERY
Payer: MEDICARE

## 2018-04-12 PROCEDURE — 97140 MANUAL THERAPY 1/> REGIONS: CPT | Performed by: PHYSICAL THERAPIST

## 2018-04-12 PROCEDURE — 97110 THERAPEUTIC EXERCISES: CPT | Performed by: PHYSICAL THERAPIST

## 2018-04-12 NOTE — PROGRESS NOTES
Diagnosis: L hip/knee stiffness  Authorized # of Visits:  12      Next MD visit: none scheduled  Fall Risk: standard         Precautions: n/a           Medication Changes since last visit?: No     Subjective: Patient reports feels the knee is moving better

## 2018-04-16 ENCOUNTER — APPOINTMENT (OUTPATIENT)
Dept: PHYSICAL THERAPY | Age: 73
End: 2018-04-16
Attending: ORTHOPAEDIC SURGERY
Payer: MEDICARE

## 2018-04-16 ENCOUNTER — TELEPHONE (OUTPATIENT)
Dept: PHYSICAL THERAPY | Age: 73
End: 2018-04-16

## 2018-04-18 ENCOUNTER — OFFICE VISIT (OUTPATIENT)
Dept: PHYSICAL THERAPY | Age: 73
End: 2018-04-18
Attending: ORTHOPAEDIC SURGERY
Payer: MEDICARE

## 2018-04-18 PROCEDURE — 97140 MANUAL THERAPY 1/> REGIONS: CPT | Performed by: PHYSICAL THERAPIST

## 2018-04-18 PROCEDURE — 97110 THERAPEUTIC EXERCISES: CPT | Performed by: PHYSICAL THERAPIST

## 2018-04-23 ENCOUNTER — OFFICE VISIT (OUTPATIENT)
Dept: PHYSICAL THERAPY | Age: 73
End: 2018-04-23
Attending: ORTHOPAEDIC SURGERY
Payer: MEDICARE

## 2018-04-23 PROCEDURE — 97110 THERAPEUTIC EXERCISES: CPT | Performed by: PHYSICAL THERAPIST

## 2018-04-23 PROCEDURE — 97140 MANUAL THERAPY 1/> REGIONS: CPT | Performed by: PHYSICAL THERAPIST

## 2018-04-23 NOTE — PROGRESS NOTES
Diagnosis: L hip/knee stiffness  Authorized # of Visits:  15      Next MD visit: none scheduled  Fall Risk: standard         Precautions: n/a           Medication Changes since last visit?: No     Subjective: Patient reports easier in and out of car.   Stat

## 2018-04-25 ENCOUNTER — OFFICE VISIT (OUTPATIENT)
Dept: PHYSICAL THERAPY | Age: 73
End: 2018-04-25
Attending: ORTHOPAEDIC SURGERY
Payer: MEDICARE

## 2018-04-25 PROCEDURE — 97140 MANUAL THERAPY 1/> REGIONS: CPT | Performed by: PHYSICAL THERAPIST

## 2018-04-25 PROCEDURE — 97110 THERAPEUTIC EXERCISES: CPT | Performed by: PHYSICAL THERAPIST

## 2018-04-27 NOTE — PROGRESS NOTES
Diagnosis: L hip/knee stiffness  Authorized # of Visits:  15      Next MD visit: none scheduled  Fall Risk: standard         Precautions: n/a           Medication Changes since last visit?: No     Subjective: Patient reports able to get up 1 step at home t

## 2018-04-30 ENCOUNTER — OFFICE VISIT (OUTPATIENT)
Dept: PHYSICAL THERAPY | Age: 73
End: 2018-04-30
Attending: ORTHOPAEDIC SURGERY
Payer: MEDICARE

## 2018-04-30 PROCEDURE — 97110 THERAPEUTIC EXERCISES: CPT | Performed by: PHYSICAL THERAPIST

## 2018-04-30 PROCEDURE — 97140 MANUAL THERAPY 1/> REGIONS: CPT | Performed by: PHYSICAL THERAPIST

## 2018-04-30 NOTE — PROGRESS NOTES
Diagnosis: L hip/knee stiffness  Authorized # of Visits:  21     Next MD visit: none scheduled  Fall Risk: standard         Precautions: n/a           Medication Changes since last visit?: No   Medicare Update note    Subjective: Patient reports continued met  4. Increase in hip strength to 3/5 or greater to improve stability with daily activities. - Not met            Plan: Continue, advance as able. Continuing ongoing strengthening, stabilization work, gait work.      Charges: Ex 2, MM 1       Total Timed

## 2018-05-02 ENCOUNTER — OFFICE VISIT (OUTPATIENT)
Dept: PHYSICAL THERAPY | Age: 73
End: 2018-05-02
Attending: ORTHOPAEDIC SURGERY
Payer: MEDICARE

## 2018-05-02 PROCEDURE — 97140 MANUAL THERAPY 1/> REGIONS: CPT | Performed by: PHYSICAL THERAPIST

## 2018-05-02 PROCEDURE — 97110 THERAPEUTIC EXERCISES: CPT | Performed by: PHYSICAL THERAPIST

## 2018-05-02 NOTE — PROGRESS NOTES
Diagnosis: L hip/knee stiffness  Authorized # of Visits:  21     Next MD visit: none scheduled  Fall Risk: standard         Precautions: n/a           Medication Changes since last visit?: No   Medicare Update note    Subjective: Patient reports knee [place]

## 2018-05-08 ENCOUNTER — OFFICE VISIT (OUTPATIENT)
Dept: PHYSICAL THERAPY | Age: 73
End: 2018-05-08
Attending: ORTHOPAEDIC SURGERY
Payer: MEDICARE

## 2018-05-08 PROCEDURE — 97110 THERAPEUTIC EXERCISES: CPT | Performed by: PHYSICAL THERAPIST

## 2018-05-08 PROCEDURE — 97140 MANUAL THERAPY 1/> REGIONS: CPT | Performed by: PHYSICAL THERAPIST

## 2018-05-08 NOTE — PROGRESS NOTES
Diagnosis: L hip/knee stiffness  Authorized # of Visits:  21    Next MD visit: none scheduled  Fall Risk: standard         Precautions: n/a           Medication Changes since last visit?: No   Medicare Update note    Subjective: Patient reports she continu

## 2018-05-10 ENCOUNTER — OFFICE VISIT (OUTPATIENT)
Dept: PHYSICAL THERAPY | Age: 73
End: 2018-05-10
Attending: ORTHOPAEDIC SURGERY
Payer: MEDICARE

## 2018-05-10 PROCEDURE — 97110 THERAPEUTIC EXERCISES: CPT | Performed by: PHYSICAL THERAPIST

## 2018-05-10 PROCEDURE — 97140 MANUAL THERAPY 1/> REGIONS: CPT | Performed by: PHYSICAL THERAPIST

## 2018-05-10 NOTE — PROGRESS NOTES
Diagnosis: L hip/knee stiffness  Authorized # of Visits:  25    Next MD visit: none scheduled  Fall Risk: standard         Precautions: n/a           Medication Changes since last visit?: No     Subjective: Patient reports she continues to walk better and advance as able. Continuing ongoing strengthening, stabilization work, gait work.      Charges: Ex 2, MM 1       Total Timed Treatment: 45 min  Total Treatment Time: 45 min

## 2018-05-14 ENCOUNTER — OFFICE VISIT (OUTPATIENT)
Dept: PHYSICAL THERAPY | Age: 73
End: 2018-05-14
Attending: ORTHOPAEDIC SURGERY
Payer: MEDICARE

## 2018-05-14 PROCEDURE — 97140 MANUAL THERAPY 1/> REGIONS: CPT | Performed by: PHYSICAL THERAPIST

## 2018-05-14 PROCEDURE — 97110 THERAPEUTIC EXERCISES: CPT | Performed by: PHYSICAL THERAPIST

## 2018-05-14 NOTE — PROGRESS NOTES
Diagnosis: L hip/knee stiffness  Authorized # of Visits:  21    Next MD visit: none scheduled  Fall Risk: standard         Precautions: n/a           Medication Changes since last visit?: No     Subjective: Patient reports she has been feeling very good si

## 2018-05-16 ENCOUNTER — APPOINTMENT (OUTPATIENT)
Dept: PHYSICAL THERAPY | Age: 73
End: 2018-05-16
Attending: ORTHOPAEDIC SURGERY
Payer: MEDICARE

## 2018-05-21 ENCOUNTER — HOSPITAL ENCOUNTER (OUTPATIENT)
Age: 73
Discharge: HOME | End: 2018-05-21
Attending: EMERGENCY MEDICINE
Payer: MEDICARE

## 2018-05-21 ENCOUNTER — APPOINTMENT (OUTPATIENT)
Dept: GENERAL RADIOLOGY | Age: 73
End: 2018-05-21
Attending: EMERGENCY MEDICINE
Payer: MEDICARE

## 2018-05-21 VITALS
WEIGHT: 136 LBS | TEMPERATURE: 98 F | HEART RATE: 95 BPM | OXYGEN SATURATION: 98 % | BODY MASS INDEX: 25.03 KG/M2 | DIASTOLIC BLOOD PRESSURE: 76 MMHG | HEIGHT: 62 IN | SYSTOLIC BLOOD PRESSURE: 141 MMHG | RESPIRATION RATE: 16 BRPM

## 2018-05-21 DIAGNOSIS — S00.93XA CONTUSION OF HEAD, UNSPECIFIED PART OF HEAD, INITIAL ENCOUNTER: ICD-10-CM
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
## 2018-05-21 DIAGNOSIS — S92.901A CLOSED FRACTURE OF RIGHT FOOT, INITIAL ENCOUNTER: Primary | ICD-10-CM

## 2018-05-21 PROCEDURE — 73502 X-RAY EXAM HIP UNI 2-3 VIEWS: CPT | Performed by: EMERGENCY MEDICINE

## 2018-05-21 PROCEDURE — 99203 OFFICE O/P NEW LOW 30 MIN: CPT

## 2018-05-21 PROCEDURE — 73552 X-RAY EXAM OF FEMUR 2/>: CPT | Performed by: EMERGENCY MEDICINE

## 2018-05-21 PROCEDURE — 73630 X-RAY EXAM OF FOOT: CPT | Performed by: EMERGENCY MEDICINE

## 2018-05-21 PROCEDURE — 99214 OFFICE O/P EST MOD 30 MIN: CPT

## 2018-05-21 PROCEDURE — 28470 CLTX METATARSAL FX WO MNP EA: CPT

## 2018-05-21 NOTE — ED INITIAL ASSESSMENT (HPI)
PATIENT REPORTS FALL ON Saturday. STATES SHE WAS WEARING SHOES, TURNED AND FELL. PATIENT STATES SHE STRUCK HER HEAD + BRUISING TO LEFT TEMPLE AREA.   DENIES LOC, NAUSEA OR VOMITING AFTER FALL.  + BRUISING AND ABRASION TO RIGHT ELBOW.  + ROM INTACT TO RIGH

## 2018-05-21 NOTE — ED PROVIDER NOTES
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     Smo Pulmonary/Chest: Effort normal and breath sounds normal.   Abdominal: Soft. There is no tenderness. There is no rigidity, no rebound, no guarding and no CVA tenderness. Musculoskeletal: She exhibits no edema.         Right shoulder: Normal.        Right e    Intact left total hip arthroplasty.     Extensive proximal femoral heterotopic ossification.     No acute fracture or dislocation.               XR FEMUR MIN 2 VIEWS LEFT (CPT=73552) (Final result)   Result time 05/21/18 12:42:37   Final result by Lilly Cote, Contusion of head, unspecified part of head, initial encounter  Fall, initial encounter    Disposition:  Discharge  5/21/2018 12:44 pm    Follow-up:      Schedule an appointment as soon as possible for a visit in 1 day  For follow-up        Medications Pre

## 2018-05-22 ENCOUNTER — APPOINTMENT (OUTPATIENT)
Dept: PHYSICAL THERAPY | Age: 73
End: 2018-05-22
Attending: ORTHOPAEDIC SURGERY
Payer: MEDICARE

## 2018-05-25 ENCOUNTER — APPOINTMENT (OUTPATIENT)
Dept: PHYSICAL THERAPY | Age: 73
End: 2018-05-25
Attending: ORTHOPAEDIC SURGERY
Payer: MEDICARE

## 2018-05-29 ENCOUNTER — OFFICE VISIT (OUTPATIENT)
Dept: PHYSICAL THERAPY | Age: 73
End: 2018-05-29
Attending: ORTHOPAEDIC SURGERY
Payer: MEDICARE

## 2018-05-29 DIAGNOSIS — C40.22 MALIGNANT NEOPLASM OF LONG BONE OF LEFT LOWER EXTREMITY (HCC): ICD-10-CM

## 2018-05-29 PROCEDURE — 97110 THERAPEUTIC EXERCISES: CPT | Performed by: PHYSICAL THERAPIST

## 2018-05-29 PROCEDURE — 97140 MANUAL THERAPY 1/> REGIONS: CPT | Performed by: PHYSICAL THERAPIST

## 2018-05-29 NOTE — PROGRESS NOTES
Diagnosis: L hip/knee stiffness  Authorized # of Visits:  24    Next MD visit: none scheduled  Fall Risk: standard         Precautions: n/a           Medication Changes since last visit?: No     Subjective: Patient reports she is just using a shoe to suppo

## 2018-05-31 ENCOUNTER — APPOINTMENT (OUTPATIENT)
Dept: PHYSICAL THERAPY | Age: 73
End: 2018-05-31
Attending: ORTHOPAEDIC SURGERY
Payer: MEDICARE

## 2018-06-05 ENCOUNTER — OFFICE VISIT (OUTPATIENT)
Dept: PHYSICAL THERAPY | Age: 73
End: 2018-06-05
Attending: ORTHOPAEDIC SURGERY
Payer: MEDICARE

## 2018-06-05 PROCEDURE — 97140 MANUAL THERAPY 1/> REGIONS: CPT | Performed by: PHYSICAL THERAPIST

## 2018-06-05 PROCEDURE — 97110 THERAPEUTIC EXERCISES: CPT | Performed by: PHYSICAL THERAPIST

## 2018-06-11 ENCOUNTER — OFFICE VISIT (OUTPATIENT)
Dept: PHYSICAL THERAPY | Age: 73
End: 2018-06-11
Attending: ORTHOPAEDIC SURGERY
Payer: MEDICARE

## 2018-06-11 PROCEDURE — 97110 THERAPEUTIC EXERCISES: CPT | Performed by: PHYSICAL THERAPIST

## 2018-08-03 ENCOUNTER — OFFICE VISIT (OUTPATIENT)
Dept: PHYSICAL THERAPY | Age: 73
End: 2018-08-03
Attending: FAMILY MEDICINE
Payer: MEDICARE

## 2018-08-03 DIAGNOSIS — C40.22 MALIGNANT NEOPLASM OF LONG BONE OF LEFT LOWER EXTREMITY (HCC): ICD-10-CM

## 2018-08-03 PROCEDURE — 97163 PT EVAL HIGH COMPLEX 45 MIN: CPT | Performed by: PHYSICAL THERAPIST

## 2018-08-03 NOTE — PROGRESS NOTES
LOWER EXTREMITY EVALUATION:   Referring Physician: Deb Knight  Date of Onset: Dec 2016 Date of Service: 8/3/2018   Diagnosis: L hip   PATIENT SUMMARY:   Peter Keller is a 81year old y/o female who presents to therapy today with complaints of l.imited hi 0    WNL     PROM:  Hip   Flexion: R 130; L 70         Flexibility:   Hip Flexor: L > R restriction  Hamstrings: L > R restriction  Piriformis: NT  Quads: limited by knee flexion L  Gastroc: L > R restriction    Strength/MMT:   Hip Knee Foot/Ankle   Flexio treatment and while under my care.     X___________________________________________________Date______________    Certification From: 0/9/7820  To:11/1/2018

## 2018-08-07 ENCOUNTER — OFFICE VISIT (OUTPATIENT)
Dept: PHYSICAL THERAPY | Age: 73
End: 2018-08-07
Attending: FAMILY MEDICINE
Payer: MEDICARE

## 2018-08-07 PROCEDURE — 97140 MANUAL THERAPY 1/> REGIONS: CPT | Performed by: PHYSICAL THERAPIST

## 2018-08-07 PROCEDURE — 97110 THERAPEUTIC EXERCISES: CPT | Performed by: PHYSICAL THERAPIST

## 2018-08-07 NOTE — PROGRESS NOTES
Diagnosis: Hip replacement L  Authorized # of Visits:  2         Next MD visit: none scheduled  Fall Risk: standard         Precautions: n/a           Medication Changes since last visit?: No  Subjective: Patient reports has been doing her exercises now an

## 2018-08-10 ENCOUNTER — OFFICE VISIT (OUTPATIENT)
Dept: PHYSICAL THERAPY | Age: 73
End: 2018-08-10
Attending: FAMILY MEDICINE
Payer: MEDICARE

## 2018-08-10 PROCEDURE — 97110 THERAPEUTIC EXERCISES: CPT | Performed by: PHYSICAL THERAPIST

## 2018-08-10 PROCEDURE — 97140 MANUAL THERAPY 1/> REGIONS: CPT | Performed by: PHYSICAL THERAPIST

## 2018-08-10 NOTE — PROGRESS NOTES
Diagnosis: Hip replacement L  Authorized # of Visits:  3        Next MD visit: none scheduled  Fall Risk: standard         Precautions: n/a           Medication Changes since last visit?: No  Subjective: Patient reports has been back to all of her exercise

## 2018-08-13 ENCOUNTER — OFFICE VISIT (OUTPATIENT)
Dept: PHYSICAL THERAPY | Age: 73
End: 2018-08-13
Attending: FAMILY MEDICINE
Payer: MEDICARE

## 2018-08-13 PROCEDURE — 97140 MANUAL THERAPY 1/> REGIONS: CPT | Performed by: PHYSICAL THERAPIST

## 2018-08-13 PROCEDURE — 97110 THERAPEUTIC EXERCISES: CPT | Performed by: PHYSICAL THERAPIST

## 2018-08-13 NOTE — PROGRESS NOTES
Diagnosis: Hip replacement L  Authorized # of Visits:  3        Next MD visit: none scheduled  Fall Risk: standard         Precautions: n/a           Medication Changes since last visit?: No  Subjective: Patient reports continues exercises and brace.     Ob Plan: Continued, advance as able.         Charges: Ex 2 MM       Total Timed Treatment: 45 min  Total Treatment Time: 45 min

## 2018-08-17 ENCOUNTER — OFFICE VISIT (OUTPATIENT)
Dept: PHYSICAL THERAPY | Age: 73
End: 2018-08-17
Attending: FAMILY MEDICINE
Payer: MEDICARE

## 2018-08-17 PROCEDURE — 97110 THERAPEUTIC EXERCISES: CPT | Performed by: PHYSICAL THERAPIST

## 2018-08-17 NOTE — PROGRESS NOTES
Diagnosis: Hip replacement L  Authorized # of Visits:  5        Next MD visit: none scheduled  Fall Risk: standard         Precautions: n/a           Medication Changes since last visit?: No  Subjective: Patient reports continues exercises and brace.     Ob knee/hip to assist ability to go up/down stairs. 3. Patient to be able to balance on L leg for 10 seconds or greater. 4. Increase in hip strength to 3/5 or greater to improve stability with daily activities.        •                       Plan: Continued

## 2018-08-21 ENCOUNTER — OFFICE VISIT (OUTPATIENT)
Dept: PHYSICAL THERAPY | Age: 73
End: 2018-08-21
Attending: ORTHOPAEDIC SURGERY
Payer: MEDICARE

## 2018-08-21 PROCEDURE — 97140 MANUAL THERAPY 1/> REGIONS: CPT

## 2018-08-21 PROCEDURE — 97110 THERAPEUTIC EXERCISES: CPT

## 2018-08-21 NOTE — PROGRESS NOTES
Diagnosis: Hip replacement L  Authorized # of Visits:  6        Next MD visit: none scheduled  Fall Risk: standard         Precautions: n/a           Medication Changes since last visit?: No  Subjective: Patient reports 0/10 L hip pain today.  Patient state 30 reps  Standing heel raises x 15 Hip abd RTB x 30 reps each walking, standing heel raises x 15       Side steps YTB x 30 eps each  Step ups 6\" x 10, side steps 6\" x 15          Assessment: Patient demo'd passive end range flexion/abduction mobility how

## 2018-08-24 ENCOUNTER — OFFICE VISIT (OUTPATIENT)
Dept: PHYSICAL THERAPY | Age: 73
End: 2018-08-24
Attending: FAMILY MEDICINE
Payer: MEDICARE

## 2018-08-24 PROCEDURE — 97110 THERAPEUTIC EXERCISES: CPT | Performed by: PHYSICAL THERAPIST

## 2018-08-24 NOTE — PROGRESS NOTES
Diagnosis: Hip replacement L  Authorized # of Visits:  7        Next MD visit: none scheduled  Fall Risk: standard         Precautions: n/a           Medication Changes since last visit?: No  Subjective: Patient reports continues exercises and brace.     Ob on isometric contractions at ranges of hip flexion above 40 deg.  present and instructed him in help with hip flexion isometrics knee straight and bend and side lying hip abd to end range. Demonstrated correctly.        Goals:       • Therapy Goals

## 2018-08-27 ENCOUNTER — OFFICE VISIT (OUTPATIENT)
Dept: PHYSICAL THERAPY | Age: 73
End: 2018-08-27
Attending: FAMILY MEDICINE
Payer: MEDICARE

## 2018-08-27 PROCEDURE — 97110 THERAPEUTIC EXERCISES: CPT | Performed by: PHYSICAL THERAPIST

## 2018-08-27 NOTE — PROGRESS NOTES
Diagnosis: Hip replacement L  Authorized # of Visits:  7        Next MD visit: none scheduled  Fall Risk: standard         Precautions: n/a           Medication Changes since last visit?: No  Subjective: Patient reports she has been continuing with the exe Stand to sit x 10 Total gym single/double leg squats x 15 each       Standing marches Total gym squats x 15 Sit to stand x 10       Standing hip abd steps with YTB Shuttle leg press double 6B x 15  Shuttle leg press singles 2 x 15 4B Hip abd with YTB x 30

## 2018-08-29 ENCOUNTER — LAB ENCOUNTER (OUTPATIENT)
Dept: LAB | Age: 73
End: 2018-08-29
Attending: FAMILY MEDICINE
Payer: MEDICARE

## 2018-08-29 ENCOUNTER — OFFICE VISIT (OUTPATIENT)
Dept: PHYSICAL THERAPY | Age: 73
End: 2018-08-29
Attending: ORTHOPAEDIC SURGERY
Payer: MEDICARE

## 2018-08-29 DIAGNOSIS — E78.2 MIXED HYPERLIPIDEMIA: Primary | ICD-10-CM

## 2018-08-29 LAB
ALBUMIN SERPL BCP-MCNC: 4.1 G/DL (ref 3.5–4.8)
ALBUMIN/GLOB SERPL: 1.6 {RATIO} (ref 1–2)
ALP SERPL-CCNC: 63 U/L (ref 32–100)
ALT SERPL-CCNC: 18 U/L (ref 14–54)
ANION GAP SERPL CALC-SCNC: 8 MMOL/L (ref 0–18)
AST SERPL-CCNC: 21 U/L (ref 15–41)
BILIRUB SERPL-MCNC: 1.4 MG/DL (ref 0.3–1.2)
BUN SERPL-MCNC: 16 MG/DL (ref 8–20)
BUN/CREAT SERPL: 27.1 (ref 10–20)
CALCIUM SERPL-MCNC: 9.4 MG/DL (ref 8.5–10.5)
CHLORIDE SERPL-SCNC: 110 MMOL/L (ref 95–110)
CHOLEST SERPL-MCNC: 169 MG/DL (ref 110–200)
CO2 SERPL-SCNC: 21 MMOL/L (ref 22–32)
CREAT SERPL-MCNC: 0.59 MG/DL (ref 0.5–1.5)
GLOBULIN PLAS-MCNC: 2.6 G/DL (ref 2.5–3.7)
GLUCOSE SERPL-MCNC: 101 MG/DL (ref 70–99)
HDLC SERPL-MCNC: 44 MG/DL
LDLC SERPL CALC-MCNC: 100 MG/DL (ref 0–99)
NONHDLC SERPL-MCNC: 125 MG/DL
OSMOLALITY UR CALC.SUM OF ELEC: 289 MOSM/KG (ref 275–295)
PATIENT FASTING: YES
POTASSIUM SERPL-SCNC: 3.6 MMOL/L (ref 3.3–5.1)
PROT SERPL-MCNC: 6.7 G/DL (ref 5.9–8.4)
SODIUM SERPL-SCNC: 139 MMOL/L (ref 136–144)
TRIGL SERPL-MCNC: 125 MG/DL (ref 1–149)

## 2018-08-29 PROCEDURE — 36415 COLL VENOUS BLD VENIPUNCTURE: CPT

## 2018-08-29 PROCEDURE — 97110 THERAPEUTIC EXERCISES: CPT | Performed by: PHYSICAL THERAPIST

## 2018-08-29 PROCEDURE — 80053 COMPREHEN METABOLIC PANEL: CPT

## 2018-08-29 PROCEDURE — 80061 LIPID PANEL: CPT

## 2018-08-29 NOTE — PROGRESS NOTES
Diagnosis: Hip replacement L  Authorized # of Visits:  7        Next MD visit: none scheduled  Fall Risk: standard         Precautions: n/a           Medication Changes since last visit?: No  Subjective: Patient reports she has been continuing with the exe x 10 sitting last rep  Seated hip flex to knee with hand slides x 10 Squats to chair x 10 , sitting last rep, seated hiip flex with reach to knee x 10    Side glut med x 10 Side glut med 2 x 10 Leg press 7B x 20 double  Leg press 6B single 2  x15 Standing

## 2018-09-04 ENCOUNTER — OFFICE VISIT (OUTPATIENT)
Dept: PHYSICAL THERAPY | Age: 73
End: 2018-09-04
Attending: ORTHOPAEDIC SURGERY
Payer: MEDICARE

## 2018-09-04 PROCEDURE — 97110 THERAPEUTIC EXERCISES: CPT | Performed by: PHYSICAL THERAPIST

## 2018-09-04 NOTE — PROGRESS NOTES
Diagnosis: Hip replacement L  Authorized # of Visits:  8       Next MD visit: none scheduled  Fall Risk: standard         Precautions: n/a           Medication Changes since last visit?: No  Subjective: Patient reports she has been continuing with the exer ranges. Side glut med 2 x 10 with AA t o end range, isometric hold at end range.    Prone 3# x 20 hip extension  Prone HS curls 2# x 20   Hip flex knee extended - end range holds x 5 Hip flexor holds various angles Supine SLR 3  x10 Squats to chair x 10 wit or greater to improve stability with daily activities. •                       Plan: Continue, advance as able.         Charges: Ex 3      Total Timed Treatment: 45 min  Total Treatment Time: 45 min

## 2018-09-06 ENCOUNTER — OFFICE VISIT (OUTPATIENT)
Dept: PHYSICAL THERAPY | Age: 73
End: 2018-09-06
Attending: ORTHOPAEDIC SURGERY
Payer: MEDICARE

## 2018-09-06 PROCEDURE — 97110 THERAPEUTIC EXERCISES: CPT | Performed by: PHYSICAL THERAPIST

## 2018-09-06 NOTE — PROGRESS NOTES
Diagnosis: Hip replacement L  Authorized # of Visits:  8       Next MD visit: none scheduled  Fall Risk: standard         Precautions: n/a           Medication Changes since last visit?: No  Subjective: Patient reports she feels allittle stiff today with t slides x 10 Squats to chair x 10 , sitting last rep, seated hiip flex with reach to knee x 10 Leg press 8B double x 20  Leg press single 7B 2 x 15    Squats to chair x 10, hip flex reach to knee x 12 Squats to chair x 10  Leg press double x 20 8B  Leg pres

## 2018-09-12 ENCOUNTER — OFFICE VISIT (OUTPATIENT)
Dept: PHYSICAL THERAPY | Age: 73
End: 2018-09-12
Attending: ORTHOPAEDIC SURGERY
Payer: MEDICARE

## 2018-09-12 PROCEDURE — 97110 THERAPEUTIC EXERCISES: CPT | Performed by: PHYSICAL THERAPIST

## 2018-09-12 NOTE — PROGRESS NOTES
Diagnosis: Hip replacement L  Authorized # of Visits:  8       Next MD visit: none scheduled  Fall Risk: standard         Precautions: n/a           Medication Changes since last visit?: No  Subjective: Patient reports she feels allittle stiff today with t assist for steps , continuing to work on hip flexor strengthening. Attempted steps in stairwell this session. Still needs assist for the very last part of step. Goals:       • Therapy Goals            1. Patient independent with HEP  2.  Patient to

## 2018-09-14 ENCOUNTER — OFFICE VISIT (OUTPATIENT)
Dept: PHYSICAL THERAPY | Age: 73
End: 2018-09-14
Attending: ORTHOPAEDIC SURGERY
Payer: MEDICARE

## 2018-09-14 PROCEDURE — 97110 THERAPEUTIC EXERCISES: CPT | Performed by: PHYSICAL THERAPIST

## 2018-09-14 NOTE — PROGRESS NOTES
Diagnosis: Hip replacement L  Authorized # of Visits:  8       Next MD visit: none scheduled  Fall Risk: standard         Precautions: n/a           Medication Changes since last visit?: No  Subjective: Patient reports not feeling too bad today,.     Object

## 2018-09-21 ENCOUNTER — OFFICE VISIT (OUTPATIENT)
Dept: PHYSICAL THERAPY | Age: 73
End: 2018-09-21
Attending: ORTHOPAEDIC SURGERY
Payer: MEDICARE

## 2018-09-21 PROCEDURE — 97110 THERAPEUTIC EXERCISES: CPT | Performed by: PHYSICAL THERAPIST

## 2018-09-21 NOTE — PROGRESS NOTES
Diagnosis: Hip replacement L  Authorized # of Visits:  8       Next MD visit: none scheduled  Fall Risk: standard         Precautions: n/a           Medication Changes since last visit?: No  Subjective: Patient reports she is getting another handrail insta hikes on L x 15  Squats to  Chair x 10      Standing hip abd with RTB x 5 laps Standing hip abd RTB x 5 laps      Standing hip flexion L - AROM 57            Assessment: Patient with assist R/L regular steps going up able to complete 5 before noted circumd

## 2018-09-28 ENCOUNTER — OFFICE VISIT (OUTPATIENT)
Dept: PHYSICAL THERAPY | Age: 73
End: 2018-09-28
Attending: ORTHOPAEDIC SURGERY
Payer: MEDICARE

## 2018-09-28 PROCEDURE — 97110 THERAPEUTIC EXERCISES: CPT | Performed by: PHYSICAL THERAPIST

## 2018-09-28 NOTE — PROGRESS NOTES
Diagnosis: Hip replacement L  Authorized # of Visits:  36       Next MD visit: none scheduled  Fall Risk: standard         Precautions: n/a           Medication Changes since last visit?: No  Subjective: Patient reports the knee flees stiff today.      Anola Barthel greater to improve stability with daily activities. •                       Plan: Continue, advance as able.         Charges: Ex 3      Total Timed Treatment: 45 min  Total Treatment Time: 45 min

## 2018-10-01 ENCOUNTER — OFFICE VISIT (OUTPATIENT)
Dept: PHYSICAL THERAPY | Age: 73
End: 2018-10-01
Attending: ORTHOPAEDIC SURGERY
Payer: MEDICARE

## 2018-10-01 PROCEDURE — 97110 THERAPEUTIC EXERCISES: CPT | Performed by: PHYSICAL THERAPIST

## 2018-10-01 NOTE — PROGRESS NOTES
Diagnosis: Hip replacement L  Authorized # of Visits:  36       Next MD visit: none scheduled  Fall Risk: standard         Precautions: n/a           Medication Changes since last visit?: No  Subjective: Patient reports the knee flees stiff today.      Rafal Perez improve stability with daily activities.        •                       Plan: Reassess next session, plan discharge to independent program.         Charges: Ex 3      Total Timed Treatment: 45 min  Total Treatment Time: 45 min

## 2018-10-02 ENCOUNTER — TELEPHONE (OUTPATIENT)
Dept: PHYSICAL THERAPY | Age: 73
End: 2018-10-02

## 2018-10-08 ENCOUNTER — OFFICE VISIT (OUTPATIENT)
Dept: PHYSICAL THERAPY | Age: 73
End: 2018-10-08
Attending: ORTHOPAEDIC SURGERY
Payer: MEDICARE

## 2018-10-08 PROCEDURE — 97110 THERAPEUTIC EXERCISES: CPT | Performed by: PHYSICAL THERAPIST

## 2018-10-08 NOTE — PROGRESS NOTES
Diagnosis: Hip replacement L  Authorized # of Visits:  36       Next MD visit: none scheduled  Fall Risk: standard         Precautions: n/a           Medication Changes since last visit?: No  Subjective: Patient reports this is her last day, states will co Discharge at this time to independent [facility]       Charges: Ex 3      Total Timed Treatment: 45 min  Total Treatment Time: 45 min

## 2019-09-05 ENCOUNTER — LAB ENCOUNTER (OUTPATIENT)
Dept: LAB | Age: 74
End: 2019-09-05
Attending: FAMILY MEDICINE
Payer: MEDICARE

## 2019-09-05 DIAGNOSIS — E78.00 PURE HYPERCHOLESTEROLEMIA: Primary | ICD-10-CM

## 2019-09-05 LAB
ALBUMIN SERPL-MCNC: 3.9 G/DL (ref 3.4–5)
ALBUMIN/GLOB SERPL: 1.2 {RATIO} (ref 1–2)
ALP LIVER SERPL-CCNC: 82 U/L (ref 55–142)
ALT SERPL-CCNC: 23 U/L (ref 13–56)
ANION GAP SERPL CALC-SCNC: 6 MMOL/L (ref 0–18)
AST SERPL-CCNC: 18 U/L (ref 15–37)
BILIRUB SERPL-MCNC: 1.3 MG/DL (ref 0.1–2)
BUN BLD-MCNC: 10 MG/DL (ref 7–18)
BUN/CREAT SERPL: 12.2 (ref 10–20)
CALCIUM BLD-MCNC: 9.4 MG/DL (ref 8.5–10.1)
CHLORIDE SERPL-SCNC: 109 MMOL/L (ref 98–112)
CHOLEST SMN-MCNC: 168 MG/DL (ref ?–200)
CO2 SERPL-SCNC: 28 MMOL/L (ref 21–32)
CREAT BLD-MCNC: 0.82 MG/DL (ref 0.55–1.02)
GLOBULIN PLAS-MCNC: 3.2 G/DL (ref 2.8–4.4)
GLUCOSE BLD-MCNC: 100 MG/DL (ref 70–99)
HDLC SERPL-MCNC: 46 MG/DL (ref 40–59)
LDLC SERPL CALC-MCNC: 94 MG/DL (ref ?–100)
M PROTEIN MFR SERPL ELPH: 7.1 G/DL (ref 6.4–8.2)
NONHDLC SERPL-MCNC: 122 MG/DL (ref ?–130)
OSMOLALITY SERPL CALC.SUM OF ELEC: 295 MOSM/KG (ref 275–295)
PATIENT FASTING: YES
PATIENT FASTING: YES
POTASSIUM SERPL-SCNC: 3.7 MMOL/L (ref 3.5–5.1)
SODIUM SERPL-SCNC: 143 MMOL/L (ref 136–145)
TRIGL SERPL-MCNC: 139 MG/DL (ref 30–149)
VLDLC SERPL CALC-MCNC: 28 MG/DL (ref 0–30)

## 2019-09-05 PROCEDURE — 80061 LIPID PANEL: CPT

## 2019-09-05 PROCEDURE — 36415 COLL VENOUS BLD VENIPUNCTURE: CPT

## 2019-09-05 PROCEDURE — 80053 COMPREHEN METABOLIC PANEL: CPT

## 2020-01-28 ENCOUNTER — APPOINTMENT (OUTPATIENT)
Dept: PHYSICAL THERAPY | Age: 75
End: 2020-01-28
Attending: ORTHOPAEDIC SURGERY
Payer: MEDICARE

## 2020-02-03 ENCOUNTER — ORDER TRANSCRIPTION (OUTPATIENT)
Dept: PHYSICAL THERAPY | Age: 75
End: 2020-02-03

## 2020-02-03 ENCOUNTER — OFFICE VISIT (OUTPATIENT)
Dept: PHYSICAL THERAPY | Age: 75
End: 2020-02-03
Attending: ORTHOPAEDIC SURGERY
Payer: MEDICARE

## 2020-02-03 DIAGNOSIS — M89.8X5 PAIN IN FEMUR: Primary | ICD-10-CM

## 2020-02-03 PROCEDURE — 97110 THERAPEUTIC EXERCISES: CPT | Performed by: PHYSICAL THERAPIST

## 2020-02-03 PROCEDURE — 97163 PT EVAL HIGH COMPLEX 45 MIN: CPT | Performed by: PHYSICAL THERAPIST

## 2020-02-03 NOTE — PROGRESS NOTES
LOWER EXTREMITY EVALUATION:   Referring Physician:   Diagnosis: L hip restriction Date of Service: 2/3/2020     PATIENT SUMMARY   Dorsie Eisenmenger is a 69year old female who presents to therapy today with complaints of L hip tightness and weakne goals.     Precautions:  None  OBJECTIVE:   Palpation: mild tenderness anterior lateral L thigh  Sensation: intact    AROM: (* denotes performed with pain)  Hip Knee Foot/Ankle   Flexion: R 110; L 65 AA/P 70  Extension: R 8 L 5  Abduction: R 30 ; L 20  ER: to improve Ortega balance score by 5-7 deg to decrease fall risk. 4. Patient to improve TUG score to decrease fall risk. Frequency / Duration: Patient will be seen for 2 x/week or a total of 12-18 visits over a 90 day period.  Treatment will include: Ga

## 2020-02-05 ENCOUNTER — OFFICE VISIT (OUTPATIENT)
Dept: PHYSICAL THERAPY | Age: 75
End: 2020-02-05
Attending: ORTHOPAEDIC SURGERY
Payer: MEDICARE

## 2020-02-05 PROCEDURE — 97110 THERAPEUTIC EXERCISES: CPT | Performed by: PHYSICAL THERAPIST

## 2020-02-05 NOTE — PROGRESS NOTES
Dx: L hip replacement          Insurance (Authorized # of Visits):  2/10          Authorizing Physician: Dr. Vick Officer Next MD visit: none scheduled  Fall Risk: standard         Precautions: n/a             Subjective:Patient reports able to get the rollinat

## 2020-02-10 ENCOUNTER — OFFICE VISIT (OUTPATIENT)
Dept: PHYSICAL THERAPY | Age: 75
End: 2020-02-10
Attending: ORTHOPAEDIC SURGERY
Payer: MEDICARE

## 2020-02-10 PROCEDURE — 97110 THERAPEUTIC EXERCISES: CPT | Performed by: PHYSICAL THERAPIST

## 2020-02-10 NOTE — PROGRESS NOTES
Dx: L hip replacement          Insurance (Authorized # of Visits):  2/10          Authorizing Physician: Dr. Radha Kirk Next MD visit: none scheduled  Fall Risk: standard         Precautions: n/a             Subjective:Patient reports was able to start back t

## 2020-02-12 ENCOUNTER — OFFICE VISIT (OUTPATIENT)
Dept: PHYSICAL THERAPY | Age: 75
End: 2020-02-12
Attending: ORTHOPAEDIC SURGERY
Payer: MEDICARE

## 2020-02-12 PROCEDURE — 97110 THERAPEUTIC EXERCISES: CPT | Performed by: PHYSICAL THERAPIST

## 2020-02-12 NOTE — PROGRESS NOTES
Dx: L hip replacement          Insurance (Authorized # of Visits):  4/5          Authorizing Physician: Dr. Anitha Mcdaniel MD visit: none scheduled  Fall Risk: standard         Precautions: n/a             Subjective:Patient reports was a little sore after

## 2020-02-17 ENCOUNTER — OFFICE VISIT (OUTPATIENT)
Dept: PHYSICAL THERAPY | Age: 75
End: 2020-02-17
Attending: ORTHOPAEDIC SURGERY
Payer: MEDICARE

## 2020-02-17 PROCEDURE — 97110 THERAPEUTIC EXERCISES: CPT | Performed by: PHYSICAL THERAPIST

## 2020-02-17 NOTE — PROGRESS NOTES
Dx: L hip replacement          Insurance (Authorized # of Visits):  8/11          Authorizing Physician: Dr. Taylor Doing Next MD visit: none scheduled  Fall Risk: standard         Precautions: n/a             Subjective:Patient reports not too much soreness af

## 2020-02-19 ENCOUNTER — OFFICE VISIT (OUTPATIENT)
Dept: PHYSICAL THERAPY | Age: 75
End: 2020-02-19
Attending: ORTHOPAEDIC SURGERY
Payer: MEDICARE

## 2020-02-19 PROCEDURE — 97110 THERAPEUTIC EXERCISES: CPT | Performed by: PHYSICAL THERAPIST

## 2020-02-20 NOTE — PROGRESS NOTES
Dx: L hip replacement          Insurance (Authorized # of Visits):  6/10          Authorizing Physician: Dr. Anaya Mcdaniel MD visit: none scheduled  Fall Risk: standard         Precautions: n/a             Subjective:Patient reports she did a good work out strengthening with YTB as noted in daily recored.      Charges: Ex 3      Total Timed Treatment: 45 min  Total Treatment Time: 45 min

## 2020-02-24 ENCOUNTER — OFFICE VISIT (OUTPATIENT)
Dept: PHYSICAL THERAPY | Age: 75
End: 2020-02-24
Attending: ORTHOPAEDIC SURGERY
Payer: MEDICARE

## 2020-02-24 PROCEDURE — 97110 THERAPEUTIC EXERCISES: CPT | Performed by: PHYSICAL THERAPIST

## 2020-02-24 NOTE — PROGRESS NOTES
Dx: L hip replacement          Insurance (Authorized # of Visits):  8/7          Authorizing Physician: Dr. Addison Doran Next MD visit: none scheduled  Fall Risk: standard         Precautions: n/a             Subjective:Patient reports she is continuing to wor

## 2020-02-26 ENCOUNTER — OFFICE VISIT (OUTPATIENT)
Dept: PHYSICAL THERAPY | Age: 75
End: 2020-02-26
Attending: ORTHOPAEDIC SURGERY
Payer: MEDICARE

## 2020-02-26 PROCEDURE — 97110 THERAPEUTIC EXERCISES: CPT | Performed by: PHYSICAL THERAPIST

## 2020-02-27 NOTE — PROGRESS NOTES
Dx: L hip replacement          Insurance (Authorized # of Visits):  7/7          Authorizing Physician: Dr. Anaya Mcdaniel MD visit: none scheduled  Fall Risk: standard         Precautions: n/a             Subjective:Patient reports she is continuing to wor

## 2020-03-02 ENCOUNTER — OFFICE VISIT (OUTPATIENT)
Dept: PHYSICAL THERAPY | Age: 75
End: 2020-03-02
Attending: ORTHOPAEDIC SURGERY
Payer: MEDICARE

## 2020-03-02 PROCEDURE — 97110 THERAPEUTIC EXERCISES: CPT | Performed by: PHYSICAL THERAPIST

## 2020-03-02 NOTE — PROGRESS NOTES
Dx: L hip replacement          Insurance (Authorized # of Visits):  9/10          Authorizing Physician: Dr. Isabel Mcdaniel MD visit: none scheduled  Fall Risk: standard         Precautions: n/a             Subjective:Patient reports she is continuing to wor

## 2020-03-04 ENCOUNTER — OFFICE VISIT (OUTPATIENT)
Dept: PHYSICAL THERAPY | Age: 75
End: 2020-03-04
Attending: ORTHOPAEDIC SURGERY
Payer: MEDICARE

## 2020-03-04 PROCEDURE — 97110 THERAPEUTIC EXERCISES: CPT | Performed by: PHYSICAL THERAPIST

## 2020-03-04 NOTE — PROGRESS NOTES
Dx: L hip replacement          Insurance (Authorized # of Visits):  10/10          Authorizing Physician: Dr. Christelle Blackwood Next MD visit: none scheduled  Fall Risk: standard         Precautions: n/a      MEDICARE 10 VISIT PROGRESS NOTE         Subjective:Nusrat

## 2020-03-09 ENCOUNTER — OFFICE VISIT (OUTPATIENT)
Dept: PHYSICAL THERAPY | Age: 75
End: 2020-03-09
Attending: ORTHOPAEDIC SURGERY
Payer: MEDICARE

## 2020-03-09 PROCEDURE — 97110 THERAPEUTIC EXERCISES: CPT | Performed by: PHYSICAL THERAPIST

## 2020-03-09 NOTE — PROGRESS NOTES
Dx: L hip replacement          Insurance (Authorized # of Visits):  11/10          Authorizing Physician: Dr. Estela Mcdaniel MD visit: none scheduled  Fall Risk: standard         Precautions: n/a               Subjective:Patient reports has taken the walker

## 2020-03-11 ENCOUNTER — OFFICE VISIT (OUTPATIENT)
Dept: PHYSICAL THERAPY | Age: 75
End: 2020-03-11
Attending: ORTHOPAEDIC SURGERY
Payer: MEDICARE

## 2020-03-11 PROCEDURE — 97110 THERAPEUTIC EXERCISES: CPT | Performed by: PHYSICAL THERAPIST

## 2020-03-11 NOTE — PROGRESS NOTES
Dx: L hip replacement          Insurance (Authorized # of Visits):  11/10          Authorizing Physician: Dr. Jez Marte Next MD visit: none scheduled  Fall Risk: standard         Precautions: n/a               Subjective:Patient reports has taken the walker of handrail as needed.      UE strengthening:   Red tubing sitting  Rows,  shoulder extension y tubing , ER bilat YTB - all x 15 reps   work on bed mobility, up from sitting without use of arm rest         HEP:     Charges: Ex 3      Total Timed Treatment:

## 2020-08-06 ENCOUNTER — OFFICE VISIT (OUTPATIENT)
Dept: FAMILY MEDICINE CLINIC | Facility: CLINIC | Age: 75
End: 2020-08-06
Payer: MEDICARE

## 2020-08-06 VITALS
DIASTOLIC BLOOD PRESSURE: 79 MMHG | HEIGHT: 62 IN | SYSTOLIC BLOOD PRESSURE: 122 MMHG | BODY MASS INDEX: 27.05 KG/M2 | OXYGEN SATURATION: 96 % | HEART RATE: 109 BPM | WEIGHT: 147 LBS

## 2020-08-06 DIAGNOSIS — Z12.11 ENCOUNTER FOR SCREENING COLONOSCOPY: ICD-10-CM

## 2020-08-06 DIAGNOSIS — I10 ESSENTIAL HYPERTENSION: Primary | ICD-10-CM

## 2020-08-06 PROCEDURE — G0463 HOSPITAL OUTPT CLINIC VISIT: HCPCS | Performed by: FAMILY MEDICINE

## 2020-08-06 PROCEDURE — 99202 OFFICE O/P NEW SF 15 MIN: CPT | Performed by: FAMILY MEDICINE

## 2020-08-06 RX ORDER — CLARITHROMYCIN 500 MG/1
TABLET, COATED ORAL
COMMUNITY
Start: 2020-05-28 | End: 2020-08-06 | Stop reason: ALTCHOICE

## 2020-08-06 RX ORDER — ARIPIPRAZOLE 2 MG/1
TABLET ORAL
COMMUNITY
Start: 2020-08-03 | End: 2020-09-16

## 2020-08-06 RX ORDER — LISINOPRIL 20 MG/1
TABLET ORAL
COMMUNITY
Start: 2020-06-05 | End: 2020-11-28

## 2020-08-06 NOTE — PROGRESS NOTES
Blood pressure 122/79, pulse 109, height 5' 2\" (1.575 m), weight 147 lb (66.7 kg), SpO2 96 %. Patient presents today for an initial visit history of hypertension. 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   She denies ches

## 2020-09-11 ENCOUNTER — LAB ENCOUNTER (OUTPATIENT)
Dept: LAB | Age: 75
End: 2020-09-11
Attending: FAMILY MEDICINE
Payer: MEDICARE

## 2020-09-11 DIAGNOSIS — I10 ESSENTIAL HYPERTENSION: ICD-10-CM

## 2020-09-11 LAB
ALBUMIN SERPL-MCNC: 4 G/DL (ref 3.4–5)
ALBUMIN/GLOB SERPL: 1.3 {RATIO} (ref 1–2)
ALP LIVER SERPL-CCNC: 73 U/L (ref 55–142)
ALT SERPL-CCNC: 24 U/L (ref 13–56)
ANION GAP SERPL CALC-SCNC: 7 MMOL/L (ref 0–18)
AST SERPL-CCNC: 31 U/L (ref 15–37)
BACTERIA UR QL AUTO: NEGATIVE /HPF
BILIRUB SERPL-MCNC: 1.5 MG/DL (ref 0.1–2)
BILIRUB UR QL: NEGATIVE
BUN BLD-MCNC: 11 MG/DL (ref 7–18)
BUN/CREAT SERPL: 12.5 (ref 10–20)
CALCIUM BLD-MCNC: 9.9 MG/DL (ref 8.5–10.1)
CHLORIDE SERPL-SCNC: 108 MMOL/L (ref 98–112)
CHOLEST SMN-MCNC: 161 MG/DL (ref ?–200)
CLARITY UR: CLEAR
CO2 SERPL-SCNC: 27 MMOL/L (ref 21–32)
COLOR UR: YELLOW
CREAT BLD-MCNC: 0.88 MG/DL (ref 0.55–1.02)
GLOBULIN PLAS-MCNC: 3.1 G/DL (ref 2.8–4.4)
GLUCOSE BLD-MCNC: 96 MG/DL (ref 70–99)
GLUCOSE UR-MCNC: NEGATIVE MG/DL
HDLC SERPL-MCNC: 48 MG/DL (ref 40–59)
HGB UR QL STRIP.AUTO: NEGATIVE
KETONES UR-MCNC: NEGATIVE MG/DL
LDLC SERPL CALC-MCNC: 92 MG/DL (ref ?–100)
M PROTEIN MFR SERPL ELPH: 7.1 G/DL (ref 6.4–8.2)
NITRITE UR QL STRIP.AUTO: NEGATIVE
NONHDLC SERPL-MCNC: 113 MG/DL (ref ?–130)
OSMOLALITY SERPL CALC.SUM OF ELEC: 293 MOSM/KG (ref 275–295)
PATIENT FASTING Y/N/NP: YES
PATIENT FASTING Y/N/NP: YES
PH UR: 5 [PH] (ref 5–8)
POTASSIUM SERPL-SCNC: 3.7 MMOL/L (ref 3.5–5.1)
PROT UR-MCNC: NEGATIVE MG/DL
RBC #/AREA URNS AUTO: 1 /HPF
SODIUM SERPL-SCNC: 142 MMOL/L (ref 136–145)
SP GR UR STRIP: 1.02 (ref 1–1.03)
T4 FREE SERPL-MCNC: 1 NG/DL (ref 0.8–1.7)
TRIGL SERPL-MCNC: 106 MG/DL (ref 30–149)
TSI SER-ACNC: 4.07 MIU/ML (ref 0.36–3.74)
UROBILINOGEN UR STRIP-ACNC: 2
VLDLC SERPL CALC-MCNC: 21 MG/DL (ref 0–30)
WBC #/AREA URNS AUTO: 3 /HPF

## 2020-09-11 PROCEDURE — 84443 ASSAY THYROID STIM HORMONE: CPT

## 2020-09-11 PROCEDURE — 80053 COMPREHEN METABOLIC PANEL: CPT

## 2020-09-11 PROCEDURE — 36415 COLL VENOUS BLD VENIPUNCTURE: CPT

## 2020-09-11 PROCEDURE — 84439 ASSAY OF FREE THYROXINE: CPT

## 2020-09-11 PROCEDURE — 80061 LIPID PANEL: CPT

## 2020-09-11 PROCEDURE — 81001 URINALYSIS AUTO W/SCOPE: CPT

## 2020-09-16 ENCOUNTER — OFFICE VISIT (OUTPATIENT)
Dept: FAMILY MEDICINE CLINIC | Facility: CLINIC | Age: 75
End: 2020-09-16
Payer: MEDICARE

## 2020-09-16 VITALS
BODY MASS INDEX: 27.08 KG/M2 | SYSTOLIC BLOOD PRESSURE: 111 MMHG | HEIGHT: 62 IN | DIASTOLIC BLOOD PRESSURE: 78 MMHG | WEIGHT: 147.19 LBS | HEART RATE: 66 BPM

## 2020-09-16 DIAGNOSIS — C40.22 MALIGNANT NEOPLASM OF LONG BONE OF LEFT LOWER EXTREMITY (HCC): ICD-10-CM

## 2020-09-16 DIAGNOSIS — F32.89 OTHER DEPRESSION: ICD-10-CM

## 2020-09-16 DIAGNOSIS — E03.9 HYPOTHYROIDISM, UNSPECIFIED TYPE: ICD-10-CM

## 2020-09-16 DIAGNOSIS — J45.20 MILD INTERMITTENT ASTHMA WITHOUT COMPLICATION: ICD-10-CM

## 2020-09-16 DIAGNOSIS — Z00.00 MEDICARE ANNUAL WELLNESS VISIT, SUBSEQUENT: Primary | ICD-10-CM

## 2020-09-16 DIAGNOSIS — I10 ESSENTIAL HYPERTENSION: ICD-10-CM

## 2020-09-16 DIAGNOSIS — Z00.00 ENCOUNTER FOR ANNUAL HEALTH EXAMINATION: ICD-10-CM

## 2020-09-16 PROCEDURE — 90662 IIV NO PRSV INCREASED AG IM: CPT | Performed by: FAMILY MEDICINE

## 2020-09-16 PROCEDURE — G0008 ADMIN INFLUENZA VIRUS VAC: HCPCS | Performed by: FAMILY MEDICINE

## 2020-09-16 PROCEDURE — G0009 ADMIN PNEUMOCOCCAL VACCINE: HCPCS | Performed by: FAMILY MEDICINE

## 2020-09-16 PROCEDURE — G0439 PPPS, SUBSEQ VISIT: HCPCS | Performed by: FAMILY MEDICINE

## 2020-09-16 PROCEDURE — 99213 OFFICE O/P EST LOW 20 MIN: CPT | Performed by: FAMILY MEDICINE

## 2020-09-16 PROCEDURE — G0463 HOSPITAL OUTPT CLINIC VISIT: HCPCS | Performed by: FAMILY MEDICINE

## 2020-09-16 PROCEDURE — 90732 PPSV23 VACC 2 YRS+ SUBQ/IM: CPT | Performed by: FAMILY MEDICINE

## 2020-09-16 RX ORDER — BUPROPION HYDROCHLORIDE 150 MG/1
150 TABLET, EXTENDED RELEASE ORAL DAILY
Qty: 30 TABLET | Refills: 2 | Status: SHIPPED | OUTPATIENT
Start: 2020-09-16 | End: 2020-12-07

## 2020-09-16 NOTE — PROGRESS NOTES
HPI:   Paula Mccarty is a 69year old female who presents for a Medicare Initial Annual Wellness visit (Once after 12 month Medicare anniversary) .     History of intermittent asthma no complications also hypertension malignant neoplasm of the bone no recur but quit greater than 12 months ago.   Social History    Tobacco Use      Smoking status: Former Smoker        Types: Cigarettes      Smokeless tobacco: Never Used         CAGE Alcohol screening   Kacie Hastings was screened for Alcohol abuse and had a score 100 MG Oral Tab, Take  by mouth. Biotin 5000 MCG Oral Cap, Take  by mouth. Multiple Vitamins-Minerals (MULTI FOR HER 50+) Oral Tab, Take  by mouth.        MEDICAL INFORMATION:   She  has a past medical history of Breast cancer, left ([facility] [address] Encounter for zach Weight as of this encounter: 147 lb 3 oz (66.8 kg).     Medicare Hearing Assessment  (Required for AWV/SWV)    Hearing Screening    Time taken:  9/16/2020  9:38 AM  Screening Method:  Questionnaire  I have a problem hearing over the telephone:  Sometimes I tenderness   Throat: Lips, mucosa, and tongue normal; teeth and gums normal   Neck: Supple, symmetrical, trachea midline, no adenopathy;  thyroid: not enlarged, symmetric, no tenderness/mass/nodules; no carotid bruit or JVD   Back:   Symmetric, no curvatur exercise. No follow-ups on file. Luke Rocha DO, 9/16/2020     General Health     In the past six months, have you lost more than 10 pounds without trying?: (P) 2 - No  How would you describe your daily physical activity?: (P) Light  How woul Annually to 78, then as discussed Mammogram due on 05/15/2019 Update Health Maintenance if applicable     Immunizations (Update Immunization Activity if applicable)     Influenza  Covered Annually 9/16/2020 Please get every year    Pneumococcal 13 (Prevnar Essential hypertension  Lisinopril controlled    5. Hypothyroidism, unspecified type  Abnormal numbers will recheck in 6 weeks  - ASSAY, THYROID STIM HORMONE; Future  - THYROID ANTITHYROGLOBULIN AB; Future  - THYROID PEROXIDASE (TPO) AB; Future  6.   Depres

## 2020-09-16 NOTE — PATIENT INSTRUCTIONS
Fall Prevention  Falls often occur due to slipping, tripping or losing your balance. Millions of people fall every year and injure themselves. Here are ways to reduce your risk of falling again.    · Think about your fall, was there anything that caused y · If you have pets, know where they are before you stand up or walk so you don't trip over them. · Use night lights. · Go over all your medicines with a pharmacist or other healthcare provider to see if any of them could make you more likely to fall.   St EKG - covered if needed at Welcome to Medicare, and non-screening if indicated for medical reasons Electrocardiogram date Routine EKG is not a screening covered service except at the Welcome to Medicare Visit    Abdominal aortic aneurysm screening (once b There are no preventive care reminders to display for this patient. Chlamydia  Annually if high risk No results found for: CHLAMYDIA No flowsheet data found.     Screening Mammogram      Mammogram    Recommend Annually to at least age 76, and as needed A link to the [employer] for the rest of us. This site has a lot of good information including definitions of the different types of Advance Directives.  It also has the State forms available on it's website for anyone to review and print using their home

## 2020-09-28 ENCOUNTER — TELEPHONE (OUTPATIENT)
Dept: FAMILY MEDICINE CLINIC | Facility: CLINIC | Age: 75
End: 2020-09-28

## 2020-09-28 DIAGNOSIS — I10 ESSENTIAL HYPERTENSION: Primary | ICD-10-CM

## 2020-09-28 NOTE — TELEPHONE ENCOUNTER
lmtcb please transfer to triage rn     Chart reviewed, aripiprazole has not been prescribed by Dr Dariel Gardner, the patient asked them to contact Dr Mariajose Steve for refills, previously written by Dr Ana Cristina Velazquez.  .     See 9/16 progress notes, patient

## 2020-09-28 NOTE — TELEPHONE ENCOUNTER
Message from [facility];    Patient requesting new prescription for Aripirazole 2mg tabs

## 2020-09-30 NOTE — TELEPHONE ENCOUNTER
Left message to call back. Please transfer to triage. 2nd attempt. I have also sent patient a [employer] message to call us back.

## 2020-10-01 NOTE — TELEPHONE ENCOUNTER
Left message to call back, transfer to triage    Sent no response letter to my chart. Please verify read.

## 2020-10-02 RX ORDER — ESCITALOPRAM OXALATE 20 MG/1
20 TABLET ORAL DAILY
Qty: 90 TABLET | Refills: 1 | Status: SHIPPED | OUTPATIENT
Start: 2020-10-02 | End: 2021-03-01

## 2020-10-02 NOTE — TELEPHONE ENCOUNTER
Patient indicated that Dr Js Duff discontinued the abilify and prescribed the wellbutrin. Patient indicated that does need a refill on her lexapro 20mg daily. Stated Dr She Sanchez is retiring. Please advise.      [address] Brooke confirmed lexapro 20mg d

## 2020-10-06 ENCOUNTER — TELEPHONE (OUTPATIENT)
Dept: FAMILY MEDICINE CLINIC | Facility: CLINIC | Age: 75
End: 2020-10-06

## 2020-10-06 NOTE — TELEPHONE ENCOUNTER
Gregg Cummins, Dr. Zeinab Díaz office, called in stating that patient is being seen today at 1pm and is requesting most recent office notes and labs. She is requesting that faxed to below:  Fax# [phone]    Please advise.

## 2020-11-09 ENCOUNTER — NURSE ONLY (OUTPATIENT)
Dept: GASTROENTEROLOGY | Facility: CLINIC | Age: 75
End: 2020-11-09

## 2020-11-09 DIAGNOSIS — Z86.010 PERSONAL HISTORY OF COLONIC POLYPS: Primary | ICD-10-CM

## 2020-11-09 DIAGNOSIS — Z83.71 FAMILY HISTORY OF COLONIC POLYPS: ICD-10-CM

## 2020-11-09 RX ORDER — POLYETHYLENE GLYCOL 3350, SODIUM CHLORIDE, SODIUM BICARBONATE, POTASSIUM CHLORIDE 420; 11.2; 5.72; 1.48 G/4L; G/4L; G/4L; G/4L
POWDER, FOR SOLUTION ORAL
Qty: 1 BOTTLE | Refills: 0 | Status: SHIPPED | OUTPATIENT
Start: 2020-11-09 | End: 2021-03-25

## 2020-11-09 NOTE — PROGRESS NOTES
Forwarded to Ani RUBIN. See below reports. Epic does not allow me to go back to 3065, so uncertain of recall, and I no longer have access to Next Gen. See below operative/path report. Patient states her PCP thinks she would be due for repeat.  Meds/allerg

## 2020-11-09 NOTE — PROGRESS NOTES
Left message on voicemail to call back for screening. See below 2013 colon with Dr Vandana Sevilla. Patient:   Elysia Real #:   [id number]                    Room: [facility]  Jake AMADOR #:  [id number]     ADMITTED:   08/05/2013        OPERATIVE REPORT: which were biopsied. There were scattered diverticula throughout the colon  without complication. In the cecum was a 3-4 mm sessile polyp which was  cold biopsy irradiated. No ongoing bleeding.   No other colonic polyps,  mass lesions, or vascular anomal

## 2020-11-09 NOTE — PROGRESS NOTES
The patient's chart has been reviewed. Okay to schedule pt for 5 year colonoscopy recall r/t history of colon polyps, family history of colon polyps with Dr. Jaydon Pina.      Advise IV twilight or MAC sedation with split dose Colyte/TriLyte or equivalent(

## 2020-11-16 ENCOUNTER — TELEPHONE (OUTPATIENT)
Dept: GASTROENTEROLOGY | Facility: CLINIC | Age: 75
End: 2020-11-16

## 2020-11-16 NOTE — PROGRESS NOTES
Scheduled for:  Colonoscopy - [address]1434  Provider Name:  Dr. Lucinda Krishnamurthy  Date:  2/9/21  Location:  [facility]  Sedation:  MAC  Time:  2:45 pm (pt is aware to arrive at 1:45 pm)  Prep:  Trilyte, Prep instructions were given to pt over the phone, pt verbalized unders

## 2020-11-23 RX ORDER — MONTELUKAST SODIUM 10 MG/1
10 TABLET ORAL DAILY
Qty: 90 TABLET | Refills: 1 | Status: SHIPPED | OUTPATIENT
Start: 2020-11-23 | End: 2021-05-24

## 2020-11-23 NOTE — TELEPHONE ENCOUNTER
Fax received.  Refill request:        •  Montelukast Sodium (SINGULAIR) 10 MG Oral Tab, Take  by mouth., Disp: 90, Rfl:

## 2020-11-28 NOTE — TELEPHONE ENCOUNTER
Received a call from the patient who is requesting a refill on the pravastatin and lisinopril. Orders pended and routed to provider for review.

## 2020-11-30 RX ORDER — PRAVASTATIN SODIUM 40 MG
40 TABLET ORAL DAILY
Qty: 90 TABLET | Refills: 1 | Status: SHIPPED | OUTPATIENT
Start: 2020-11-30 | End: 2021-05-24

## 2020-11-30 RX ORDER — LISINOPRIL 20 MG/1
20 TABLET ORAL DAILY
Qty: 90 TABLET | Refills: 1 | Status: SHIPPED | OUTPATIENT
Start: 2020-11-30 | End: 2021-05-24

## 2020-12-07 ENCOUNTER — LAB ENCOUNTER (OUTPATIENT)
Dept: LAB | Age: 75
End: 2020-12-07
Attending: FAMILY MEDICINE
Payer: MEDICARE

## 2020-12-07 DIAGNOSIS — E03.9 HYPOTHYROIDISM, UNSPECIFIED TYPE: ICD-10-CM

## 2020-12-07 PROCEDURE — 86376 MICROSOMAL ANTIBODY EACH: CPT

## 2020-12-07 PROCEDURE — 86800 THYROGLOBULIN ANTIBODY: CPT

## 2020-12-07 PROCEDURE — 84443 ASSAY THYROID STIM HORMONE: CPT

## 2020-12-07 PROCEDURE — 36415 COLL VENOUS BLD VENIPUNCTURE: CPT

## 2020-12-07 RX ORDER — BUPROPION HYDROCHLORIDE 150 MG/1
150 TABLET, EXTENDED RELEASE ORAL DAILY
Qty: 90 TABLET | Refills: 1 | Status: SHIPPED | OUTPATIENT
Start: 2020-12-07 | End: 2020-12-14

## 2020-12-07 NOTE — TELEPHONE ENCOUNTER
Fax received requesting refill. Current Outpatient Medications:     •  buPROPion HCl ER, SR, 150 MG Oral Tablet 12 Hr, Take 1 tablet (150 mg total) by mouth daily. , Disp: 30 tablet, Rfl: 2

## 2020-12-08 ENCOUNTER — OFFICE VISIT (OUTPATIENT)
Dept: OPTOMETRY | Facility: CLINIC | Age: 75
End: 2020-12-08
Payer: MEDICARE

## 2020-12-08 DIAGNOSIS — H52.13 MYOPIA WITH ASTIGMATISM AND PRESBYOPIA, BILATERAL: ICD-10-CM

## 2020-12-08 DIAGNOSIS — H25.13 AGE-RELATED NUCLEAR CATARACT OF BOTH EYES: Primary | ICD-10-CM

## 2020-12-08 DIAGNOSIS — H52.203 MYOPIA WITH ASTIGMATISM AND PRESBYOPIA, BILATERAL: ICD-10-CM

## 2020-12-08 DIAGNOSIS — H52.4 MYOPIA WITH ASTIGMATISM AND PRESBYOPIA, BILATERAL: ICD-10-CM

## 2020-12-08 PROCEDURE — 92004 COMPRE OPH EXAM NEW PT 1/>: CPT | Performed by: OPTOMETRIST

## 2020-12-08 PROCEDURE — 92015 DETERMINE REFRACTIVE STATE: CPT | Performed by: OPTOMETRIST

## 2020-12-08 RX ORDER — TROSPIUM CHLORIDE 20 MG/1
20 TABLET, FILM COATED ORAL NIGHTLY
COMMUNITY
Start: 2020-10-21

## 2020-12-08 NOTE — PROGRESS NOTES
Jessica Verdugo is a 70year old female. HPI:     HPI     Patient is in for an annual eye exam. Has +1.50 readers but is having a hard time seeing the crawl on the bottom of the TV. Last EE was at [facility] about a year ago. Did not get glasses at that time. nightly. • buPROPion HCl ER, SR, 150 MG Oral Tablet 12 Hr Take 1 tablet (150 mg total) by mouth daily. 90 tablet 1   • lisinopril 20 MG Oral Tab Take 1 tablet (20 mg total) by mouth daily.  90 tablet 1   • Pravastatin Sodium 40 MG Oral Tab Take 1 tablet Additional Tests     [place]       Right Left     Normal Normal            Slit Lamp and Fundus Exam     External Exam       Right Left    External Normal Normal          Slit Lamp Exam       Right Left    Lids/Lashes Normal Normal    Conjunctiva/Sclera N

## 2020-12-08 NOTE — PATIENT INSTRUCTIONS
Myopia with astigmatism and presbyopia, bilateral  New glasses RX given to update as needed. Age-related nuclear cataract of both eyes  No treatment is required. Will continue to observe.

## 2020-12-08 NOTE — PROGRESS NOTES
Ebony Maharaj is a 70year old female. HPI:     HPI     Patient is in for an annual eye exam. Has +1.50 readers but is having a hard time seeing the crawl on the bottom of the TV. Last EE was at [place] about a year ago. Did not get glasses at that time. nightly. • buPROPion HCl ER, SR, 150 MG Oral Tablet 12 Hr Take 1 tablet (150 mg total) by mouth daily. 90 tablet 1   • lisinopril 20 MG Oral Tab Take 1 tablet (20 mg total) by mouth daily.  90 tablet 1   • Pravastatin Sodium 40 MG Oral Tab Take 1 tablet Hoa       Right Left     Normal Normal            Slit Lamp and Fundus Exam     External Exam       Right Left    External Normal Normal          Slit Lamp Exam       Right Left    Lids/Lashes Normal Normal    Conjunctiva/Sclera Normal Normal    Corn

## 2020-12-14 ENCOUNTER — OFFICE VISIT (OUTPATIENT)
Dept: FAMILY MEDICINE CLINIC | Facility: CLINIC | Age: 75
End: 2020-12-14
Payer: MEDICARE

## 2020-12-14 VITALS
WEIGHT: 137.56 LBS | DIASTOLIC BLOOD PRESSURE: 82 MMHG | BODY MASS INDEX: 25.32 KG/M2 | HEIGHT: 62 IN | HEART RATE: 78 BPM | SYSTOLIC BLOOD PRESSURE: 116 MMHG

## 2020-12-14 DIAGNOSIS — E03.8 OTHER SPECIFIED HYPOTHYROIDISM: Primary | ICD-10-CM

## 2020-12-14 PROCEDURE — 99213 OFFICE O/P EST LOW 20 MIN: CPT | Performed by: FAMILY MEDICINE

## 2020-12-14 PROCEDURE — G0463 HOSPITAL OUTPT CLINIC VISIT: HCPCS | Performed by: FAMILY MEDICINE

## 2020-12-14 RX ORDER — BUPROPION HYDROCHLORIDE 150 MG/1
150 TABLET, EXTENDED RELEASE ORAL 2 TIMES DAILY
Qty: 180 TABLET | Refills: 1 | COMMUNITY
Start: 2020-12-14 | End: 2021-03-25

## 2020-12-14 RX ORDER — LEVOTHYROXINE SODIUM 0.03 MG/1
25 TABLET ORAL
Qty: 30 TABLET | Refills: 2 | Status: SHIPPED | OUTPATIENT
Start: 2020-12-14 | End: 2021-03-22

## 2020-12-14 NOTE — PROGRESS NOTES
Blood pressure 116/82, pulse 78, height 5' 2\" (1.575 m), weight 137 lb 9 oz (62.4 kg). Patient presents today following up for depression and hypothyroidism.   Does feel fatigued has not noticed any severe depressive symptoms but no relief with the Well

## 2021-01-05 NOTE — PROGRESS NOTES
Blood pressure 116/76, pulse 105, height 5' 2\" (1.575 m), weight 135 lb 3 oz (61.3 kg). Patient presents today following up for depression some anxiety. He is taking the Wellbutrin once daily did not increase it to twice daily.   Denies any severe depr

## 2021-01-15 ENCOUNTER — APPOINTMENT (OUTPATIENT)
Dept: CT IMAGING | Facility: HOSPITAL | Age: 76
End: 2021-01-15
Attending: EMERGENCY MEDICINE
Payer: MEDICARE

## 2021-01-15 ENCOUNTER — HOSPITAL ENCOUNTER (EMERGENCY)
Facility: HOSPITAL | Age: 76
Discharge: HOME | End: 2021-01-15
Attending: EMERGENCY MEDICINE
Payer: MEDICARE

## 2021-01-15 VITALS
HEIGHT: 62 IN | RESPIRATION RATE: 16 BRPM | OXYGEN SATURATION: 98 % | TEMPERATURE: 98 F | SYSTOLIC BLOOD PRESSURE: 153 MMHG | WEIGHT: 135.13 LBS | DIASTOLIC BLOOD PRESSURE: 79 MMHG | HEART RATE: 93 BPM | BODY MASS INDEX: 24.87 KG/M2

## 2021-01-15 DIAGNOSIS — S09.90XA INJURY OF HEAD, INITIAL ENCOUNTER: Primary | ICD-10-CM
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
## 2021-01-15 DIAGNOSIS — S01.01XA SCALP LACERATION, INITIAL ENCOUNTER: ICD-10-CM

## 2021-01-15 PROCEDURE — 99284 EMERGENCY DEPT VISIT MOD MDM: CPT

## 2021-01-15 PROCEDURE — 70450 CT HEAD/BRAIN W/O DYE: CPT | Performed by: EMERGENCY MEDICINE

## 2021-01-16 ENCOUNTER — TELEPHONE (OUTPATIENT)
Dept: FAMILY MEDICINE CLINIC | Facility: CLINIC | Age: 76
End: 2021-01-16

## 2021-01-16 NOTE — TELEPHONE ENCOUNTER
Patient returned call  She was informed of Luis's message below  Patient verbalized understanding and agreed to plan of care

## 2021-01-16 NOTE — ED PROVIDER NOTES
Patient Seen in: [facility] Emergency Department    History   Patient presents with:  Fall  Head Neck Injury      HPI    The patient presents to the ED after her walker became caught on something in her kitchen causing her to lose her balance and Smoking Status: Social History    Socioeconomic History      Marital status:       Spouse name: Not on file      Number of children: Not on file      Years of education: Not on file      Highest education level: Not on file    Tobacco Use and intact distal pulses. Pulmonary/Chest: Effort normal. No stridor. No respiratory distress. Musculoskeletal:         General: No tenderness, deformity or edema. Neurological: She is alert and oriented to person, place, and time.    Skin: Skin is wa outpatient follow-up for staple removal.    Procedure:  Laceration repair:  Verbal consent was obtained from the patient. Sterile technique. The 4 cm laceration located to the posterior scalp was anesthetized in the usual fashion.  The wound was scrubbed, d

## 2021-01-16 NOTE — TELEPHONE ENCOUNTER
Called and left messages for patient to call back regarding thyroid medication. Patient is likely having Subclinical hypothyroidism. Please advise patient discontinue Levothyroxine 25 mcg PO QAM. Recheck TFT in 6 weeks as scheduled.  No treatment at this t

## 2021-01-16 NOTE — ED INITIAL ASSESSMENT (HPI)
Patient with mechanical fall at home; +head injury without LOC. No neck pain. +lac to posterior head, bleeding controlled.

## 2021-01-16 NOTE — ED NOTES
Reviewed discharge information with patient. Patient verbalized understanding, no further questions or complaints at this time. Patient is alert and orientated x4, in no apparent distress, wheelchair assistance to lobby.  Instructed patient to return to ED

## 2021-01-16 NOTE — TELEPHONE ENCOUNTER
Kevin Minor for Dr Js Duff, please advise on levothyroxine question. (Let us know so we can update Med List.)    ER 1/15/2021. Pratik Tipton, taken by paramedics to ER.  Today, she said head is not too bad, no headache/pain, only mild soreness if rubbed against someth

## 2021-01-22 ENCOUNTER — OFFICE VISIT (OUTPATIENT)
Dept: FAMILY MEDICINE CLINIC | Facility: CLINIC | Age: 76
End: 2021-01-22
Payer: MEDICARE

## 2021-01-22 ENCOUNTER — LAB ENCOUNTER (OUTPATIENT)
Dept: LAB | Age: 76
End: 2021-01-22
Attending: FAMILY MEDICINE
Payer: MEDICARE

## 2021-01-22 VITALS
HEART RATE: 99 BPM | SYSTOLIC BLOOD PRESSURE: 132 MMHG | HEIGHT: 62 IN | RESPIRATION RATE: 19 BRPM | WEIGHT: 134.19 LBS | BODY MASS INDEX: 24.69 KG/M2 | DIASTOLIC BLOOD PRESSURE: 80 MMHG

## 2021-01-22 DIAGNOSIS — R55 SYNCOPE AND COLLAPSE: Primary | ICD-10-CM

## 2021-01-22 DIAGNOSIS — F32.89 OTHER DEPRESSION: ICD-10-CM

## 2021-01-22 DIAGNOSIS — R26.9 GAIT ABNORMALITY: ICD-10-CM

## 2021-01-22 DIAGNOSIS — R55 SYNCOPE, UNSPECIFIED SYNCOPE TYPE: Primary | ICD-10-CM

## 2021-01-22 DIAGNOSIS — C40.22 MALIGNANT NEOPLASM OF LONG BONE OF LEFT LOWER EXTREMITY (HCC): ICD-10-CM

## 2021-01-22 PROCEDURE — 99214 OFFICE O/P EST MOD 30 MIN: CPT | Performed by: FAMILY MEDICINE

## 2021-01-22 PROCEDURE — 93010 ELECTROCARDIOGRAM REPORT: CPT | Performed by: FAMILY MEDICINE

## 2021-01-22 PROCEDURE — 93005 ELECTROCARDIOGRAM TRACING: CPT

## 2021-01-22 NOTE — PROGRESS NOTES
Blood pressure 132/80, pulse 99, resp. rate 19, height 5' 2\" (1.575 m), weight 134 lb 3 oz (60.9 kg). Patient presents today following up for syncopal episode that occurred 1 week ago.   Was walking into her kitchen does not remember what happened and w

## 2021-01-25 ENCOUNTER — HOSPITAL ENCOUNTER (OUTPATIENT)
Dept: CV DIAGNOSTICS | Age: 76
Discharge: HOME | End: 2021-01-25
Attending: FAMILY MEDICINE
Payer: MEDICARE

## 2021-01-25 DIAGNOSIS — R55 SYNCOPE, UNSPECIFIED SYNCOPE TYPE: ICD-10-CM

## 2021-01-25 PROCEDURE — 93306 TTE W/DOPPLER COMPLETE: CPT | Performed by: FAMILY MEDICINE

## 2021-01-26 ENCOUNTER — TELEPHONE (OUTPATIENT)
Dept: FAMILY MEDICINE CLINIC | Facility: CLINIC | Age: 76
End: 2021-01-26

## 2021-01-26 NOTE — TELEPHONE ENCOUNTER
-  Patient wants to be \"weaned\" off of anti-anxiety meds:  Bupropion BID. Doesn't think its working. Reports no change after starting this medication on 12-14-21.     Please advise-            Patient wants you to know that she had an echocard

## 2021-01-26 NOTE — TELEPHONE ENCOUNTER
Noted, Dr. Taina Queen pt would like to know if ok to stop immediately or if she should slowly wean off the medication?

## 2021-01-27 ENCOUNTER — TELEPHONE (OUTPATIENT)
Dept: GASTROENTEROLOGY | Facility: CLINIC | Age: 76
End: 2021-01-27

## 2021-01-27 DIAGNOSIS — Z86.010 HISTORY OF COLON POLYPS: Primary | ICD-10-CM

## 2021-01-27 DIAGNOSIS — Z83.71 FH: COLON POLYPS: ICD-10-CM

## 2021-01-27 NOTE — TELEPHONE ENCOUNTER
Dr Wai TOMLINSON-Spoke to patient, stated she is being treated by a cardiologist and found out she has several heart problems. Patient wants to hold off on colonoscopy for now.       Patient canceled on epic as well as Cancel request sent to Endo  Cancel

## 2021-01-27 NOTE — TELEPHONE ENCOUNTER
Patient calling to cancel colonoscopy scheduled on 2/9/2021 due to other health issues. Patient will call back when ready to schedule, thanks.

## 2021-01-27 NOTE — TELEPHONE ENCOUNTER
Patient has read fruux message    Dr. Ibrahim  [phone]  From   Mike Palma RN To   Georgia Vázquez and Delivered   1/26/2021  1:11 PM   Last Read in [facility]   1/26/2021  1:26 PM by Jessica Verdugo

## 2021-01-31 PROBLEM — F32.1 CURRENT MODERATE EPISODE OF MAJOR DEPRESSIVE DISORDER (HCC): Status: ACTIVE | Noted: 2021-01-31

## 2021-02-12 PROBLEM — F41.1 GAD (GENERALIZED ANXIETY DISORDER): Status: ACTIVE | Noted: 2021-02-12

## 2021-02-22 ENCOUNTER — OFFICE VISIT (OUTPATIENT)
Dept: CARDIOLOGY CLINIC | Facility: CLINIC | Age: 76
End: 2021-02-22
Payer: MEDICARE

## 2021-02-22 ENCOUNTER — NURSE ONLY (OUTPATIENT)
Dept: CARDIOLOGY CLINIC | Facility: CLINIC | Age: 76
End: 2021-02-22
Payer: MEDICARE

## 2021-02-22 VITALS
BODY MASS INDEX: 24.66 KG/M2 | SYSTOLIC BLOOD PRESSURE: 112 MMHG | HEART RATE: 108 BPM | DIASTOLIC BLOOD PRESSURE: 80 MMHG | HEIGHT: 62 IN | WEIGHT: 134 LBS

## 2021-02-22 DIAGNOSIS — R55 SYNCOPE, UNSPECIFIED SYNCOPE TYPE: ICD-10-CM

## 2021-02-22 DIAGNOSIS — R55 SYNCOPE, UNSPECIFIED SYNCOPE TYPE: Primary | ICD-10-CM

## 2021-02-22 DIAGNOSIS — R26.9 GAIT ABNORMALITY: ICD-10-CM

## 2021-02-22 PROCEDURE — 93270 REMOTE 30 DAY ECG REV/REPORT: CPT | Performed by: INTERNAL MEDICINE

## 2021-02-22 PROCEDURE — 99205 OFFICE O/P NEW HI 60 MIN: CPT | Performed by: INTERNAL MEDICINE

## 2021-02-22 NOTE — PATIENT INSTRUCTIONS
-Complete 30-day event monitor (ordered by Dr. Destinee Rivers), and follow-up results with Dr. Nivia Prescott  -If the monitor is relatively unremarkable, then plan for implantable loop recorder implantation after that with Dr. Nivia Prescott.  -Increase fluid intake to 1.5-2 L non

## 2021-02-22 NOTE — H&P
[facility]    Cardiac Electrophysiology Consultation  2021    Name:  Beronica Bonilla  : 10/10/1945    Date of consultation:   2021    Referring physician: Dr. Dulce Maria Cruz    Reason for Consultation:  Syncope    History of Present Illness:  Te Diabetes Maternal Aunt         per NG: Type 2   • Glaucoma Neg      SXH:   She reports that she has quit smoking. Her smoking use included cigarettes. She has never used smokeless tobacco. She reports that she does not drink alcohol or use drugs.     Allerg rashes  RESPIRATORY: denies shortness of breath with exertion  CARDIOVASCULAR: no chest pain  GI: denies abdominal pain and denies heartburn  : no dysuria or hematuria  NEURO: denies headaches, focal weaknesses or paresthesias    Physical Exam:  Vital Si exclude an arrhythmia.   I also counseled her on empiric rx for vasovagal syncope in the meantime.    -Complete 30-day event monitor (ordered by Dr. Víctor Storey), and follow-up results with Dr. Jaydon Marcelo  -If the monitor is relatively unremarkable, then plan for im

## 2021-02-27 NOTE — TELEPHONE ENCOUNTER
Per pharmacy pt is requesting refill for the following medication. •  escitalopram 20 MG Oral Tab, Take 1 tablet (20 mg total) by mouth daily. , Disp: 90 tablet, Rfl: 1

## 2021-03-01 ENCOUNTER — TELEPHONE (OUTPATIENT)
Dept: FAMILY MEDICINE CLINIC | Facility: CLINIC | Age: 76
End: 2021-03-01

## 2021-03-01 ENCOUNTER — TELEPHONE (OUTPATIENT)
Dept: CARDIOLOGY CLINIC | Facility: CLINIC | Age: 76
End: 2021-03-01

## 2021-03-01 RX ORDER — ESCITALOPRAM OXALATE 20 MG/1
20 TABLET ORAL DAILY
Qty: 90 TABLET | Refills: 1 | Status: SHIPPED | OUTPATIENT
Start: 2021-03-01 | End: 2021-08-26

## 2021-03-01 NOTE — TELEPHONE ENCOUNTER
Current Outpatient Medications:   •  escitalopram 20 MG Oral Tab, Take 1 tablet (20 mg total) by mouth daily. , Disp: 90 tablet, Rfl: 1

## 2021-03-01 NOTE — TELEPHONE ENCOUNTER
Patient called in stating that got a voicemail in regards to scheduling an appt with Dr. Venita Belle. I did not see any notes.  Please follow up

## 2021-03-01 NOTE — TELEPHONE ENCOUNTER
Verified HIPAA and LM that can't  find that we called her for an appt. To call back if has has more information to add to that.

## 2021-03-02 NOTE — TELEPHONE ENCOUNTER
[address] , [facility] [address] [facility], [phone], [phone]      Disp Refills Start End    escitalopram 20 MG Oral Tab 90 tablet 1 3/1/2021     Sig - Route:  Take 1 tablet (20 mg total) by

## 2021-03-03 ENCOUNTER — TELEPHONE (OUTPATIENT)
Dept: FAMILY MEDICINE CLINIC | Facility: CLINIC | Age: 76
End: 2021-03-03

## 2021-03-03 DIAGNOSIS — Z23 NEED FOR VACCINATION: ICD-10-CM

## 2021-03-03 NOTE — TELEPHONE ENCOUNTER
Patient would like to know if Dr. Garrett Walden is okay with patient seeing Dr. Anderson Orta instead of Dr. Ryan Castro. Patient stated Dr. Ryan Castro first available appointment was late March and saw Dr. Anderson Orta on 2/22 and was given a heart monitor.

## 2021-03-22 ENCOUNTER — TELEPHONE (OUTPATIENT)
Dept: FAMILY MEDICINE CLINIC | Facility: CLINIC | Age: 76
End: 2021-03-22

## 2021-03-22 RX ORDER — LEVOTHYROXINE SODIUM 0.03 MG/1
25 TABLET ORAL
Qty: 30 TABLET | Refills: 0 | Status: SHIPPED | OUTPATIENT
Start: 2021-03-22 | End: 2021-03-25

## 2021-03-22 NOTE — TELEPHONE ENCOUNTER
----- Message from Yair Aponte DO sent at 3/22/2021  2:16 PM CDT -----  Please check if event monitor was completed.

## 2021-03-22 NOTE — TELEPHONE ENCOUNTER
Spoke w/Patient  & Name verified. Informed her ok, to schedule w/ Patient verbalized understanding. Apt made 21.

## 2021-03-22 NOTE — TELEPHONE ENCOUNTER
Spoke w/Patient ( & Name verified) and states today's the last day of the monitor and would like to know after she returns device will she need to f/u w/ Cardiologist or DR. WORKMAN. Please advise.

## 2021-03-23 ENCOUNTER — TELEPHONE (OUTPATIENT)
Dept: CARDIOLOGY CLINIC | Facility: CLINIC | Age: 76
End: 2021-03-23

## 2021-03-23 ENCOUNTER — APPOINTMENT (OUTPATIENT)
Dept: CARDIOLOGY CLINIC | Facility: CLINIC | Age: 76
End: 2021-03-23
Payer: MEDICARE

## 2021-03-23 DIAGNOSIS — R26.9 GAIT ABNORMALITY: ICD-10-CM

## 2021-03-23 DIAGNOSIS — R55 SYNCOPE, UNSPECIFIED SYNCOPE TYPE: ICD-10-CM

## 2021-03-23 NOTE — TELEPHONE ENCOUNTER
Pt states she has finished 30 day monitor and monitor was sent back to the company. Pt missed appt scheduled for today. Pt states she was not aware that she had an appt scheduled for today.  Pt inquiring if appt is needed or should she make an appt after he

## 2021-03-23 NOTE — TELEPHONE ENCOUNTER
Spoke with Ms. Nicholsonin Angel, instructed that she didn't have an appointment today, what showed up in Mychart was for Dr. Laurent Flores to interpret the event monitor (ordered by Dr. Lito Andrews).  Per Dr. Martínez Rai note, wanted to meet with her after monitor done to discuss his

## 2021-03-24 ENCOUNTER — LAB ENCOUNTER (OUTPATIENT)
Dept: LAB | Age: 76
End: 2021-03-24
Attending: FAMILY MEDICINE
Payer: MEDICARE

## 2021-03-24 DIAGNOSIS — E03.8 OTHER SPECIFIED HYPOTHYROIDISM: ICD-10-CM

## 2021-03-24 LAB — TSI SER-ACNC: 2.63 MIU/ML (ref 0.36–3.74)

## 2021-03-24 PROCEDURE — 36415 COLL VENOUS BLD VENIPUNCTURE: CPT

## 2021-03-24 PROCEDURE — 84443 ASSAY THYROID STIM HORMONE: CPT

## 2021-03-25 ENCOUNTER — OFFICE VISIT (OUTPATIENT)
Dept: FAMILY MEDICINE CLINIC | Facility: CLINIC | Age: 76
End: 2021-03-25
Payer: MEDICARE

## 2021-03-25 VITALS
SYSTOLIC BLOOD PRESSURE: 123 MMHG | BODY MASS INDEX: 24.84 KG/M2 | HEIGHT: 62 IN | WEIGHT: 135 LBS | HEART RATE: 90 BPM | DIASTOLIC BLOOD PRESSURE: 84 MMHG

## 2021-03-25 DIAGNOSIS — E03.8 OTHER SPECIFIED HYPOTHYROIDISM: Primary | ICD-10-CM

## 2021-03-25 PROCEDURE — 93272 ECG/REVIEW INTERPRET ONLY: CPT | Performed by: INTERNAL MEDICINE

## 2021-03-25 PROCEDURE — 99213 OFFICE O/P EST LOW 20 MIN: CPT | Performed by: FAMILY MEDICINE

## 2021-03-25 NOTE — PROGRESS NOTES
Blood pressure 123/84, pulse 90, height 5' 2\" (1.575 m), weight 135 lb (61.2 kg). Following up status post fall.  reports he heard her fall and was talking to her 3 to 4 seconds later and she was lucid at the time not confused.   No tongue bitin

## 2021-04-03 ENCOUNTER — PATIENT MESSAGE (OUTPATIENT)
Dept: FAMILY MEDICINE CLINIC | Facility: CLINIC | Age: 76
End: 2021-04-03

## 2021-04-04 NOTE — TELEPHONE ENCOUNTER
From: Kacie Hastings  To: Elizabeth Stovall. DO Josefina  Sent: 4/3/2021  3:33 PM CDT  Subject: Prescription Question    Am I supposed to be taking levothyroxine for my thyroid? Vikas gave me this prescription the other day.  I stopped taking this drug and

## 2021-04-07 ENCOUNTER — LAB ENCOUNTER (OUTPATIENT)
Dept: LAB | Age: 76
End: 2021-04-07
Attending: FAMILY MEDICINE
Payer: MEDICARE

## 2021-04-07 DIAGNOSIS — E03.8 OTHER SPECIFIED HYPOTHYROIDISM: ICD-10-CM

## 2021-04-07 PROCEDURE — 84443 ASSAY THYROID STIM HORMONE: CPT

## 2021-04-07 PROCEDURE — 36415 COLL VENOUS BLD VENIPUNCTURE: CPT

## 2021-04-08 ENCOUNTER — TELEPHONE (OUTPATIENT)
Dept: CARDIOLOGY CLINIC | Facility: CLINIC | Age: 76
End: 2021-04-08

## 2021-04-08 NOTE — TELEPHONE ENCOUNTER
Nothing on the event monitor that helps explain the syncopal spell. Please let the patient know that. Please schedule loop recorder implantation with [address]

## 2021-04-08 NOTE — TELEPHONE ENCOUNTER
Please advise if loop is needed, as no review of event monitor. Per  note- if event monitor is negative schedule loop recorder, , with "[facility]".            [facility]IT[facility]  Order: [id number]  Status:  Final result

## 2021-04-12 NOTE — TELEPHONE ENCOUNTER
Patient discussed need for loop recorder with her PCP Dr Yazmin Marvin. Patient has decided to hold off on loop recorder for now.  F/u appt w/ Dr Mally Panda cancelled for now

## 2021-04-13 NOTE — TELEPHONE ENCOUNTER
Noted. Recommend ongoing rx for vasovagal syncope (fluids, salt, caution with position changes).   AG

## 2021-04-15 ENCOUNTER — TELEPHONE (OUTPATIENT)
Dept: FAMILY MEDICINE CLINIC | Facility: CLINIC | Age: 76
End: 2021-04-15

## 2021-05-07 NOTE — TELEPHONE ENCOUNTER
Per pharmacy, pt requesting a refill on the following medication:    LEVOTHYROXINE 0.025MG TAB  Sig: Take 1 tablet by mouth before breakfast  QTY: 30

## 2021-05-11 RX ORDER — LEVOTHYROXINE SODIUM 0.03 MG/1
25 TABLET ORAL
Qty: 90 TABLET | Refills: 1 | OUTPATIENT
Start: 2021-05-11

## 2021-05-12 ENCOUNTER — TELEPHONE (OUTPATIENT)
Dept: FAMILY MEDICINE CLINIC | Facility: CLINIC | Age: 76
End: 2021-05-12

## 2021-05-12 NOTE — TELEPHONE ENCOUNTER
[place] physical therapist wants dr. Apolinar Alex to know as an FYI that this pt will finish her in home PT next week. She will continue her exercises at home. She has met all of her goals.

## 2021-05-24 RX ORDER — MONTELUKAST SODIUM 10 MG/1
10 TABLET ORAL DAILY
Qty: 90 TABLET | Refills: 1 | Status: SHIPPED | OUTPATIENT
Start: 2021-05-24 | End: 2021-08-26

## 2021-05-24 RX ORDER — LISINOPRIL 20 MG/1
20 TABLET ORAL DAILY
Qty: 90 TABLET | Refills: 1 | Status: SHIPPED | OUTPATIENT
Start: 2021-05-24 | End: 2021-08-06

## 2021-05-24 RX ORDER — PRAVASTATIN SODIUM 40 MG
40 TABLET ORAL DAILY
Qty: 90 TABLET | Refills: 1 | Status: SHIPPED | OUTPATIENT
Start: 2021-05-24 | End: 2021-08-26

## 2021-05-24 NOTE — TELEPHONE ENCOUNTER
Fax received. Refill requests:    •  lisinopril 20 MG Oral Tab, Take 1 tablet (20 mg total) by mouth daily. , Disp: 90 tablet, Rfl: 1    •  Pravastatin Sodium 40 MG Oral Tab, Take 1 tablet (40 mg total) by mouth daily. , Disp: 90 tablet, Rfl: 1    •  Denise

## 2021-06-08 ENCOUNTER — TELEMEDICINE (OUTPATIENT)
Dept: FAMILY MEDICINE CLINIC | Facility: CLINIC | Age: 76
End: 2021-06-08

## 2021-06-08 DIAGNOSIS — J30.1 SEASONAL ALLERGIC RHINITIS DUE TO POLLEN: Primary | ICD-10-CM

## 2021-06-08 DIAGNOSIS — I10 ESSENTIAL HYPERTENSION: ICD-10-CM

## 2021-06-08 PROCEDURE — 99213 OFFICE O/P EST LOW 20 MIN: CPT | Performed by: NURSE PRACTITIONER

## 2021-06-08 RX ORDER — FLUTICASONE PROPIONATE 50 MCG
2 SPRAY, SUSPENSION (ML) NASAL DAILY
Qty: 1 EACH | Refills: 3 | Status: SHIPPED | OUTPATIENT
Start: 2021-06-08 | End: 2021-09-16 | Stop reason: ALTCHOICE

## 2021-06-08 NOTE — PATIENT INSTRUCTIONS
Allergic Rhinitis  Allergic rhinitis is an allergic reaction that affects the nose, and often the eyes. It’s often known as nasal allergies. Nasal allergies are often due to things in the environment that are breathed in.  Depending what you are sensitive conditioner clean and free of mold. · Clean moldy areas with bleach and water. Don't mix bleach with other . In general:  · Vacuum once or twice a week. If possible, use a vacuum with a high-efficiency particulate air (HEPA) filter.   · Don't smok plants make small, light, dry pollen granules. These are easily spread by the wind. They may even spread to places that have few plants, such as urban areas.   Controlling pollen  Below are some tips to help you limit your exposure to pollen:  · Check polle of year. The [employer] ([employer]) records pollen and mold levels from certified stations throughout the [address] [place] [address] Road.  Track your geographic area by going to: www.aaaai.org/nab  Steve last reviewed this educational content on 5/1/2019  © 1909-1573 The S peppermint, menthol, or eucalyptus hard candies during the day. · An over-the-counter expectorant containing guaifenesin helps thin the mucus. It also helps your sinuses drain.   · You may use over-the-counter decongestants unless a similar medicine was pr If antibiotics have been prescribed to treat an acute sinus infection, talk with your provider before using a nasal rinse. This is to be sure it is safe for you.   · You may use over-the-counter medicine to control pain and fever, unless another pain medici

## 2021-06-08 NOTE — ASSESSMENT & PLAN NOTE
-otc non-drowsy antihistamine (generic claritin, zyrtec or allegra)  -add steroidal nasal spray (flonase, rhinocort -generic works well)  -can add over the counter coricidin hbp for sinus congestion    -supportive care discussed  -Please call if symptoms w

## 2021-06-08 NOTE — PROGRESS NOTES
HPI  Video visit  Pt presents for allergies for the last few days. Is taking benadryl and allegra. Is having sinus congestion. Eyes burn. Has ha  Denies fever, cough  Nasal dc is clear.          Review of Systems   Constitutional: Positive for activity [place] external  12/2017    LEFT FEMUR CANCER HARDWARE/MARLI PLACED        Family History   Problem Relation Age of Onset   • Diabetes Mother    • Endocrine Disorder Mother 58        per NG: pancreatitis   • Hypertension Mother    • Polyps Daughter         per NG: of Stress :   Social Connections:       Frequency of Communication with Friends and Family:       Frequency of Social Gatherings with Friends and Family:       Attends Spiritism Services:       Active Member of Clubs or Organizations:       Attends Club or List Items Addressed This Visit        Cardiovascular    HTN (hypertension)       Immune    Seasonal allergic rhinitis due to pollen - Primary     -otc non-drowsy antihistamine (generic claritin, zyrtec or allegra)  -add steroidal nasal spray (flonase, rhi

## 2021-06-18 ENCOUNTER — TELEPHONE (OUTPATIENT)
Dept: FAMILY MEDICINE CLINIC | Facility: CLINIC | Age: 76
End: 2021-06-18

## 2021-06-18 RX ORDER — LEVOTHYROXINE SODIUM 0.03 MG/1
25 TABLET ORAL
Qty: 90 TABLET | Refills: 1 | Status: SHIPPED | OUTPATIENT
Start: 2021-06-18 | End: 2021-08-31

## 2021-06-18 NOTE — TELEPHONE ENCOUNTER
REFILL REQUEST ON THE FOLLOWING.    LEVOTHYROXINE 0.25 MG TAB #30 TAKE 1 TABLET BY MOUTH DAILY BEFORE BREAKFAST.

## 2021-06-22 ENCOUNTER — TELEPHONE (OUTPATIENT)
Dept: FAMILY MEDICINE CLINIC | Facility: CLINIC | Age: 76
End: 2021-06-22

## 2021-06-22 NOTE — TELEPHONE ENCOUNTER
Spoke with patient, states that she is schedule to do her mammogram on July 7 ,2021 at [facility], instructed to send us the copy of the results once she has it, ,verbalized understanding  . Last annual was 9/16/20. No future appointments.       CSS

## 2021-07-19 ENCOUNTER — TELEPHONE (OUTPATIENT)
Dept: FAMILY MEDICINE CLINIC | Facility: CLINIC | Age: 76
End: 2021-07-19

## 2021-07-19 NOTE — TELEPHONE ENCOUNTER
Noted, will update Epic once [facility] [place] has signed results

## 2021-07-23 ENCOUNTER — APPOINTMENT (OUTPATIENT)
Dept: GENERAL RADIOLOGY | Age: 76
End: 2021-07-23
Attending: EMERGENCY MEDICINE
Payer: MEDICARE

## 2021-07-23 ENCOUNTER — HOSPITAL ENCOUNTER (OUTPATIENT)
Age: 76
Discharge: HOME | End: 2021-07-23
Attending: EMERGENCY MEDICINE
Payer: MEDICARE

## 2021-07-23 VITALS
TEMPERATURE: 97 F | HEART RATE: 94 BPM | RESPIRATION RATE: 20 BRPM | DIASTOLIC BLOOD PRESSURE: 74 MMHG | SYSTOLIC BLOOD PRESSURE: 120 MMHG | OXYGEN SATURATION: 97 %

## 2021-07-23 DIAGNOSIS — R55 SYNCOPE AND COLLAPSE: Primary | ICD-10-CM

## 2021-07-23 LAB
#MXD IC: 0.7 X10ˆ3/UL (ref 0.1–1)
CREAT BLD-MCNC: 0.8 MG/DL
GLUCOSE BLD-MCNC: 107 MG/DL (ref 70–99)
HCT VFR BLD AUTO: 41.5 %
HGB BLD-MCNC: 13.8 G/DL
ISTAT BUN: 14 MG/DL (ref 7–18)
ISTAT CHLORIDE: 101 MMOL/L (ref 98–112)
ISTAT HEMATOCRIT: 43 %
ISTAT IONIZED CALCIUM FOR CHEM 8: 1.32 MMOL/L (ref 1.12–1.32)
ISTAT POTASSIUM: 4.5 MMOL/L (ref 3.6–5.1)
ISTAT SODIUM: 139 MMOL/L (ref 136–145)
ISTAT TCO2: 25 MMOL/L (ref 21–32)
LYMPHOCYTES # BLD AUTO: 0.8 X10ˆ3/UL (ref 1–4)
LYMPHOCYTES NFR BLD AUTO: 8.6 %
MCH RBC QN AUTO: 30.2 PG (ref 26–34)
MCHC RBC AUTO-ENTMCNC: 33.3 G/DL (ref 31–37)
MCV RBC AUTO: 90.8 FL (ref 80–100)
MIXED CELL %: 8.3 %
NEUTROPHILS # BLD AUTO: 7.5 X10ˆ3/UL (ref 1.5–7.7)
NEUTROPHILS NFR BLD AUTO: 83.1 %
PLATELET # BLD AUTO: 205 X10ˆ3/UL (ref 150–450)
RBC # BLD AUTO: 4.57 X10ˆ6/UL
TROPONIN I BLD-MCNC: <0.02 NG/ML
WBC # BLD AUTO: 9 X10ˆ3/UL (ref 4–11)

## 2021-07-23 PROCEDURE — 80047 BASIC METABLC PNL IONIZED CA: CPT

## 2021-07-23 PROCEDURE — 71046 X-RAY EXAM CHEST 2 VIEWS: CPT | Performed by: EMERGENCY MEDICINE

## 2021-07-23 PROCEDURE — 85025 COMPLETE CBC W/AUTO DIFF WBC: CPT | Performed by: EMERGENCY MEDICINE

## 2021-07-23 PROCEDURE — 93010 ELECTROCARDIOGRAM REPORT: CPT

## 2021-07-23 PROCEDURE — 84484 ASSAY OF TROPONIN QUANT: CPT

## 2021-07-23 PROCEDURE — 99215 OFFICE O/P EST HI 40 MIN: CPT

## 2021-07-23 PROCEDURE — 99214 OFFICE O/P EST MOD 30 MIN: CPT

## 2021-07-23 PROCEDURE — 93010 ELECTROCARDIOGRAM REPORT: CPT | Performed by: EMERGENCY MEDICINE

## 2021-07-23 PROCEDURE — 36415 COLL VENOUS BLD VENIPUNCTURE: CPT

## 2021-07-23 PROCEDURE — 93005 ELECTROCARDIOGRAM TRACING: CPT

## 2021-07-23 NOTE — ED INITIAL ASSESSMENT (HPI)
Patient states she was blow drying her hair this afternoon when she began to feel dizzy. She states she may have had a syncopal episode, when she came to she was sitting on her rolling walker. Denies fall or striking her head.   Had an urinary incontinenc

## 2021-07-24 NOTE — ED QUICK NOTES
Orthostatic vitals as follows  Lying bp 121/68 hr 88  Sitting bp 133/79 hr 96  Standing bp  127/78 hr 94

## 2021-07-24 NOTE — ED PROVIDER NOTES
Patient Seen in: [facility]      History   Patient presents with:  Syncope    Stated Complaint: fainted    HPI/Subjective:   HPI    79-year-old female presents for evaluation for syncopal episode.   Patient states that she was blow drying her Social History    Tobacco Use      Smoking status: Former Smoker        Types: Cigarettes      Smokeless tobacco: Never Used    Vaping Use      Vaping Use: Never used    Alcohol use: No    Drug use: Never             Review of Systems   Con normal range of motion. Right lower leg: No edema. Left lower leg: No edema. Skin:     General: Skin is warm and dry. Neurological:      General: No focal deficit present.       Mental Status: She is alert and oriented to person, place, and ti this could have been a vagal episode. Hospital admission and eval offered and declined as they prefer outpatient workup and understand that an arrhythmia could still be a cause of syncope.      Imaging:   XR CHEST PA + LAT CHEST (CPT=71046)    Result Date: is advised to follow up with PCP for reevaluation. I provided discharge instructions and return precautions. Patient and/or caregiver voices understanding and agreement with the treatment plan. All questions were addressed and answered.

## 2021-08-05 ENCOUNTER — PATIENT MESSAGE (OUTPATIENT)
Dept: CARDIOLOGY CLINIC | Facility: CLINIC | Age: 76
End: 2021-08-05

## 2021-08-05 NOTE — TELEPHONE ENCOUNTER
From: Matt Vieira  To: Dwain Cooper MD  Sent: 8/5/2021 4:41 PM CDT  Subject: Other    I would be comfortable trying 10 mg Lispineral. Thank you. Vikas on [place] and Luis in [facility].

## 2021-08-06 RX ORDER — LISINOPRIL 10 MG/1
10 TABLET ORAL DAILY
Qty: 90 TABLET | Refills: 1 | Status: SHIPPED | OUTPATIENT
Start: 2021-08-06

## 2021-08-15 ENCOUNTER — PATIENT MESSAGE (OUTPATIENT)
Dept: CARDIOLOGY CLINIC | Facility: CLINIC | Age: 76
End: 2021-08-15

## 2021-08-16 NOTE — TELEPHONE ENCOUNTER
From: Roman Justin  To: Maria D Payne MD  Sent: 8/15/2021 11:40 AM CDT  Subject: Non-Urgent Medical Question    Here is my blood pressure after using lisinprol 1 omg. Date. Am. Pm.   8/05. 119/74. 131/87  8/06. 689/78. 99/70  8/07. 119/78. 119/75  8/08.

## 2021-08-26 RX ORDER — MONTELUKAST SODIUM 10 MG/1
TABLET ORAL
Qty: 90 TABLET | Refills: 1 | Status: SHIPPED | OUTPATIENT
Start: 2021-08-26

## 2021-08-26 RX ORDER — ESCITALOPRAM OXALATE 20 MG/1
20 TABLET ORAL DAILY
Qty: 90 TABLET | Refills: 1 | Status: SHIPPED | OUTPATIENT
Start: 2021-08-26

## 2021-08-26 RX ORDER — PRAVASTATIN SODIUM 40 MG
TABLET ORAL
Qty: 90 TABLET | Refills: 1 | Status: SHIPPED | OUTPATIENT
Start: 2021-08-26

## 2021-08-26 NOTE — TELEPHONE ENCOUNTER
Recvd fax for XR refill     escitalopram 20 MG Oral Tab, Take 1 tablet (20 mg total) by mouth daily. , Disp: 90 tablet, Rfl: 1

## 2021-08-26 NOTE — TELEPHONE ENCOUNTER
Refill passed per [facility] protocol.   Requested Prescriptions   Pending Prescriptions Disp Refills    PRAVASTATIN 40 MG Oral Tab [Pharmacy Med Name: PRAVASTATIN 40MG TABLETS] 90 tablet 1     Sig: TAKE 1 TABLET(40 MG) BY MOUTH DAILY        Cholestero

## 2021-08-26 NOTE — TELEPHONE ENCOUNTER
Med passed protocol, has high interaction, cannot proceed from here      Refill passed per [facility] protocol.   Requested Prescriptions   Pending Prescriptions Disp Refills    escitalopram 20 MG Oral Tab 90 tablet 1     Sig: Take 1 tablet (20 mg tota

## 2021-08-31 ENCOUNTER — TELEPHONE (OUTPATIENT)
Dept: NEUROLOGY | Facility: CLINIC | Age: 76
End: 2021-08-31

## 2021-08-31 ENCOUNTER — OFFICE VISIT (OUTPATIENT)
Dept: NEUROLOGY | Facility: CLINIC | Age: 76
End: 2021-08-31
Payer: MEDICARE

## 2021-08-31 VITALS
DIASTOLIC BLOOD PRESSURE: 68 MMHG | WEIGHT: 130 LBS | SYSTOLIC BLOOD PRESSURE: 108 MMHG | HEART RATE: 68 BPM | BODY MASS INDEX: 24.55 KG/M2 | HEIGHT: 61 IN

## 2021-08-31 DIAGNOSIS — G62.9 LENGTH-DEPENDENT PERIPHERAL NEUROPATHY: ICD-10-CM

## 2021-08-31 DIAGNOSIS — R40.20 LOSS OF CONSCIOUSNESS (HCC): Primary | ICD-10-CM

## 2021-08-31 DIAGNOSIS — R20.2 PARESTHESIA OF SKIN: ICD-10-CM

## 2021-08-31 DIAGNOSIS — H53.9 VISUAL AURA: ICD-10-CM

## 2021-08-31 PROCEDURE — 99205 OFFICE O/P NEW HI 60 MIN: CPT | Performed by: OTHER

## 2021-08-31 NOTE — PATIENT INSTRUCTIONS
-Please obtain laboratory blood work   -EEG (Electroencephalogram)   -We have ordered MRI brain for investigation into your symptoms   -Follow up in 3 months or sooner if needed.     -If you develop sudden onset loss of vision, double vision, room spinning/

## 2021-08-31 NOTE — TELEPHONE ENCOUNTER
Per [employer] guidelines authorization is not required for MRI BRAIN (W+WO) (CPT=70553). L/m advising of above.

## 2021-09-03 ENCOUNTER — LAB ENCOUNTER (OUTPATIENT)
Dept: LAB | Age: 76
End: 2021-09-03
Attending: Other
Payer: MEDICARE

## 2021-09-03 DIAGNOSIS — G62.9 LENGTH-DEPENDENT PERIPHERAL NEUROPATHY: ICD-10-CM

## 2021-09-03 DIAGNOSIS — R20.2 PARESTHESIA OF SKIN: ICD-10-CM

## 2021-09-03 LAB
FOLATE SERPL-MCNC: >20 NG/ML (ref 8.7–?)
TSI SER-ACNC: 3.62 MIU/ML (ref 0.36–3.74)
VIT B12 SERPL-MCNC: 672 PG/ML (ref 193–986)

## 2021-09-03 PROCEDURE — 82746 ASSAY OF FOLIC ACID SERUM: CPT

## 2021-09-03 PROCEDURE — 84446 ASSAY OF VITAMIN E: CPT

## 2021-09-03 PROCEDURE — 36415 COLL VENOUS BLD VENIPUNCTURE: CPT

## 2021-09-03 PROCEDURE — 84425 ASSAY OF VITAMIN B-1: CPT

## 2021-09-03 PROCEDURE — 86255 FLUORESCENT ANTIBODY SCREEN: CPT

## 2021-09-03 PROCEDURE — 84443 ASSAY THYROID STIM HORMONE: CPT

## 2021-09-03 PROCEDURE — 82607 VITAMIN B-12: CPT

## 2021-09-03 PROCEDURE — 86334 IMMUNOFIX E-PHORESIS SERUM: CPT

## 2021-09-03 PROCEDURE — 84207 ASSAY OF VITAMIN B-6: CPT

## 2021-09-03 PROCEDURE — 83921 ORGANIC ACID SINGLE QUANT: CPT

## 2021-09-03 PROCEDURE — 83519 RIA NONANTIBODY: CPT

## 2021-09-03 PROCEDURE — 83883 ASSAY NEPHELOMETRY NOT SPEC: CPT

## 2021-09-07 LAB
KAPPA LC FREE SER-MCNC: 1.29 MG/DL (ref 0.33–1.94)
KAPPA LC FREE/LAMBDA FREE SER NEPH: 1.17 {RATIO} (ref 0.26–1.65)
LAMBDA LC FREE SERPL-MCNC: 1.11 MG/DL (ref 0.57–2.63)

## 2021-09-09 LAB — MMA: 0.14 UMOL/L

## 2021-09-10 LAB
ALPHA-TOCOPHEROL (VIT E) -MG/L: 14.5 MG/L
GAMMA-TOCOPHEROL (VIT E) -MG/L: 0.9 MG/L
VITAMIN B1 (THIAMINE), WHOLE B: 133 NMOL/L
VITAMIN B6: 76.6 NMOL/L

## 2021-09-12 ENCOUNTER — HOSPITAL ENCOUNTER (OUTPATIENT)
Dept: MRI IMAGING | Age: 76
Discharge: HOME | End: 2021-09-12
Attending: Other
Payer: MEDICARE

## 2021-09-12 DIAGNOSIS — H53.9 VISUAL AURA: ICD-10-CM

## 2021-09-12 DIAGNOSIS — R40.20 LOSS OF CONSCIOUSNESS (HCC): ICD-10-CM

## 2021-09-12 LAB — CREAT BLD-MCNC: 0.8 MG/DL

## 2021-09-12 PROCEDURE — 82565 ASSAY OF CREATININE: CPT

## 2021-09-12 PROCEDURE — 70553 MRI BRAIN STEM W/O & W/DYE: CPT | Performed by: OTHER

## 2021-09-12 PROCEDURE — A9575 INJ GADOTERATE MEGLUMI 0.1ML: HCPCS | Performed by: OTHER

## 2021-09-14 ENCOUNTER — TELEPHONE (OUTPATIENT)
Dept: CARDIOLOGY CLINIC | Facility: CLINIC | Age: 76
End: 2021-09-14

## 2021-09-14 NOTE — TELEPHONE ENCOUNTER
Reviewed chart and pt was advised that after MD reviewed event monitor, his recommendations was for loop recorder. However, pt reviewed with PCP and it was decided not to have it completed.  Pt was advised if she is having any syncopal episodes or would lik

## 2021-09-14 NOTE — TELEPHONE ENCOUNTER
Patient calling to find out when she is to schedule next office visit follow up. Please call at [phone],IRVJIT.

## 2021-09-16 ENCOUNTER — OFFICE VISIT (OUTPATIENT)
Dept: FAMILY MEDICINE CLINIC | Facility: CLINIC | Age: 76
End: 2021-09-16
Payer: MEDICARE

## 2021-09-16 VITALS
HEART RATE: 105 BPM | HEIGHT: 61 IN | WEIGHT: 130.31 LBS | BODY MASS INDEX: 24.6 KG/M2 | DIASTOLIC BLOOD PRESSURE: 84 MMHG | SYSTOLIC BLOOD PRESSURE: 114 MMHG

## 2021-09-16 DIAGNOSIS — I10 ESSENTIAL HYPERTENSION: ICD-10-CM

## 2021-09-16 DIAGNOSIS — Z78.0 POST-MENOPAUSAL: ICD-10-CM

## 2021-09-16 DIAGNOSIS — Z00.00 MEDICARE ANNUAL WELLNESS VISIT, SUBSEQUENT: Primary | ICD-10-CM

## 2021-09-16 DIAGNOSIS — C40.22 MALIGNANT NEOPLASM OF LONG BONE OF LEFT LOWER EXTREMITY (HCC): ICD-10-CM

## 2021-09-16 DIAGNOSIS — F32.89 OTHER DEPRESSION: ICD-10-CM

## 2021-09-16 DIAGNOSIS — Z00.00 ENCOUNTER FOR ANNUAL HEALTH EXAMINATION: ICD-10-CM

## 2021-09-16 DIAGNOSIS — E03.8 OTHER SPECIFIED HYPOTHYROIDISM: ICD-10-CM

## 2021-09-16 DIAGNOSIS — Z85.3 HISTORY OF BREAST CANCER: ICD-10-CM

## 2021-09-16 DIAGNOSIS — F41.1 GAD (GENERALIZED ANXIETY DISORDER): ICD-10-CM

## 2021-09-16 DIAGNOSIS — J45.20 MILD INTERMITTENT ASTHMA WITHOUT COMPLICATION: ICD-10-CM

## 2021-09-16 DIAGNOSIS — E03.9 HYPOTHYROIDISM, UNSPECIFIED TYPE: ICD-10-CM

## 2021-09-16 PROCEDURE — G0008 ADMIN INFLUENZA VIRUS VAC: HCPCS | Performed by: FAMILY MEDICINE

## 2021-09-16 PROCEDURE — 90662 IIV NO PRSV INCREASED AG IM: CPT | Performed by: FAMILY MEDICINE

## 2021-09-16 PROCEDURE — 99213 OFFICE O/P EST LOW 20 MIN: CPT | Performed by: FAMILY MEDICINE

## 2021-09-16 PROCEDURE — G0439 PPPS, SUBSEQ VISIT: HCPCS | Performed by: FAMILY MEDICINE

## 2021-09-16 NOTE — PATIENT INSTRUCTIONS
Fall Prevention  Falls often occur due to slipping, tripping or losing your balance. Millions of people fall every year and injure themselves. Here are ways to reduce your risk of falling again.    · Think about your fall, was there anything that caused y them.  · Use night lights. · Go over all your medicines with a pharmacist or other healthcare provider to see if any of them could make you more likely to fall.   Steve last reviewed this educational content on 4/1/2018  © 2849-9367 [employer] 08/05/2013    Colonoscopy due on 08/05/2023    Flexible Sigmoidoscopy   Covered every 4 years -    Fecal Occult Blood Test Covered annually -   Bone Density Screening    Bone density screening    Covered every 2 years after age 72 if diagnosed with risk of 0.88 09/11/2020         BUN Annually Lab Results   Component Value Date    BUN 11 09/11/2020       Drug Serum Conc Annually No results found for: DIGOXIN, DIG, VALP              Recommended Websites for Advanced Directives    [email or web address]

## 2021-09-16 NOTE — PROGRESS NOTES
Cognitive Assessment     What day of the week is this?: Correct  What month is it?: Correct  What year is it?: Correct  Recall \"Ball\": Correct   Recall \"Flag\": Correct   Recall \"Tree\":  Incorrect

## 2021-09-16 NOTE — PROGRESS NOTES
HPI:   Ebony Maharaj is a 70year old female who presents for a Medicare Subsequent Annual Wellness visit (Pt already had Initial Annual Wellness).       Annual Physical due on 09/16/2022        Fall/Risk Assessment abnormal    She has been screened for [address] comment: 22 years ago          CAGE screening score of 0 on 9/13/2021, showing low risk of alcohol abuse.         Patient Care Team: Patient Care Team:  Shantanu Lees as PCP - General (Family Medicine)  Miguel Ahmadi MD (SURGERY, ORTHOPEDIC)  Mar (MULTI FOR HER 50+) Oral Tab, Take  by mouth.        MEDICAL INFORMATION:   She  has a past medical history of Breast cancer, left ([address] Encounter for chemotherapy management (06/2006), Extrinsic asthma, unspecified (1950), History of Papanicolaou smear of Hearing Screening    Time taken: 9/16/2021  1:02 PM  Screening Method: Finger Rub  Finger Rub Result: Pass             Visual Acuity  Right Eye Visual Acuity: Uncorrected Right Eye Chart Acuity: 20/200   Left Eye Visual Acuity: Uncorrected     Both Eyes Pended Date(s) Pended   • FLU VAC High Dose 65 YRS & Older PRSV Free (60815) 09/16/2021        ASSESSMENT AND OTHER RELEVANT CHRONIC CONDITIONS:   Paulette Deras is a 70year old female who presents for a Medicare Assessment.      PLAN SUMMARY:   Diagnoses Date    GLU 96 09/11/2020        Cardiovascular Disease Screening    Lipid Panel  Cholesterol  Lipoprotein (HDL)  Triglycerides Covered every 5 years for all Medicare beneficiaries without apparent signs or symptoms of cardiovascular disease Lab Results Pneumococcal Each vaccine ([id number] & Yujdyjuqa02) covered once after 65 Prevnar 13: -    [id number]: 09/16/2020     Pneumococcal Vaccination(2 of 2 - PCV13) due on 09/16/2021    Hepatitis B One screening covered for patients with certain risk factors

## 2021-09-21 ENCOUNTER — LAB ENCOUNTER (OUTPATIENT)
Dept: LAB | Age: 76
End: 2021-09-21
Attending: FAMILY MEDICINE
Payer: MEDICARE

## 2021-09-21 DIAGNOSIS — I10 ESSENTIAL HYPERTENSION: ICD-10-CM

## 2021-09-21 DIAGNOSIS — Z00.00 MEDICARE ANNUAL WELLNESS VISIT, INITIAL: Primary | ICD-10-CM

## 2021-09-21 LAB
ALBUMIN SERPL-MCNC: 3.9 G/DL (ref 3.4–5)
ALBUMIN/GLOB SERPL: 1.3 {RATIO} (ref 1–2)
ALP LIVER SERPL-CCNC: 67 U/L
ALT SERPL-CCNC: 19 U/L
ANION GAP SERPL CALC-SCNC: 5 MMOL/L (ref 0–18)
AST SERPL-CCNC: 17 U/L (ref 15–37)
BILIRUB SERPL-MCNC: 1.4 MG/DL (ref 0.1–2)
BUN BLD-MCNC: 14 MG/DL (ref 7–18)
BUN/CREAT SERPL: 24.1 (ref 10–20)
CALCIUM BLD-MCNC: 9.3 MG/DL (ref 8.5–10.1)
CHLORIDE SERPL-SCNC: 111 MMOL/L (ref 98–112)
CHOLEST SERPL-MCNC: 153 MG/DL (ref ?–200)
CO2 SERPL-SCNC: 24 MMOL/L (ref 21–32)
CREAT BLD-MCNC: 0.58 MG/DL
GLOBULIN PLAS-MCNC: 3.1 G/DL (ref 2.8–4.4)
GLUCOSE BLD-MCNC: 95 MG/DL (ref 70–99)
HDLC SERPL-MCNC: 38 MG/DL (ref 40–59)
LDLC SERPL CALC-MCNC: 90 MG/DL (ref ?–100)
NONHDLC SERPL-MCNC: 115 MG/DL (ref ?–130)
OSMOLALITY SERPL CALC.SUM OF ELEC: 290 MOSM/KG (ref 275–295)
PATIENT FASTING Y/N/NP: YES
PATIENT FASTING Y/N/NP: YES
POTASSIUM SERPL-SCNC: 3.7 MMOL/L (ref 3.5–5.1)
PROT SERPL-MCNC: 7 G/DL (ref 6.4–8.2)
SODIUM SERPL-SCNC: 140 MMOL/L (ref 136–145)
TRIGL SERPL-MCNC: 140 MG/DL (ref 30–149)
VLDLC SERPL CALC-MCNC: 23 MG/DL (ref 0–30)

## 2021-09-21 PROCEDURE — 36415 COLL VENOUS BLD VENIPUNCTURE: CPT

## 2021-09-21 PROCEDURE — 80061 LIPID PANEL: CPT

## 2021-09-21 PROCEDURE — 93010 ELECTROCARDIOGRAM REPORT: CPT | Performed by: FAMILY MEDICINE

## 2021-09-21 PROCEDURE — 93005 ELECTROCARDIOGRAM TRACING: CPT

## 2021-09-21 PROCEDURE — 80053 COMPREHEN METABOLIC PANEL: CPT

## 2021-10-04 ENCOUNTER — NURSE ONLY (OUTPATIENT)
Dept: FAMILY MEDICINE CLINIC | Facility: CLINIC | Age: 76
End: 2021-10-04
Payer: MEDICARE

## 2021-10-04 DIAGNOSIS — Z23 NEED FOR VACCINATION: Primary | ICD-10-CM

## 2021-10-04 PROCEDURE — 90670 PCV13 VACCINE IM: CPT | Performed by: FAMILY MEDICINE

## 2021-10-04 PROCEDURE — G0009 ADMIN PNEUMOCOCCAL VACCINE: HCPCS | Performed by: FAMILY MEDICINE

## 2021-11-01 ENCOUNTER — TELEPHONE (OUTPATIENT)
Dept: FAMILY MEDICINE CLINIC | Facility: CLINIC | Age: 76
End: 2021-11-01

## 2021-11-01 ENCOUNTER — HOSPITAL ENCOUNTER (OUTPATIENT)
Dept: BONE DENSITY | Age: 76
Discharge: HOME | End: 2021-11-01
Attending: FAMILY MEDICINE
Payer: MEDICARE

## 2021-11-01 DIAGNOSIS — Z78.0 POST-MENOPAUSAL: ICD-10-CM

## 2021-11-01 PROCEDURE — 77080 DXA BONE DENSITY AXIAL: CPT | Performed by: FAMILY MEDICINE

## 2021-11-02 ENCOUNTER — LAB ENCOUNTER (OUTPATIENT)
Dept: LAB | Age: 76
End: 2021-11-02
Attending: FAMILY MEDICINE
Payer: MEDICARE

## 2021-11-02 DIAGNOSIS — C40.22 MALIGNANT NEOPLASM OF LONG BONE OF LEFT LOWER EXTREMITY (HCC): ICD-10-CM

## 2021-11-02 DIAGNOSIS — Z85.3 HISTORY OF BREAST CANCER: ICD-10-CM

## 2021-11-02 DIAGNOSIS — M85.80 OSTEOPENIA, UNSPECIFIED LOCATION: ICD-10-CM

## 2021-11-02 PROCEDURE — 82306 VITAMIN D 25 HYDROXY: CPT

## 2021-11-02 PROCEDURE — 36415 COLL VENOUS BLD VENIPUNCTURE: CPT

## 2021-11-08 ENCOUNTER — HOSPITAL ENCOUNTER (OUTPATIENT)
Dept: ELECTROPHYSIOLOGY | Facility: HOSPITAL | Age: 76
Discharge: HOME | End: 2021-11-08
Attending: Other
Payer: MEDICARE

## 2021-11-08 PROBLEM — R40.20 LOSS OF CONSCIOUSNESS (HCC): Status: ACTIVE | Noted: 2021-02-22

## 2021-11-08 PROCEDURE — 95816 EEG AWAKE AND DROWSY: CPT | Performed by: OTHER

## 2021-11-09 ENCOUNTER — PATIENT MESSAGE (OUTPATIENT)
Dept: NEUROLOGY | Facility: CLINIC | Age: 76
End: 2021-11-09

## 2021-11-09 NOTE — PROCEDURES
EEG report    REFERRING PHYSICIAN: Nickie Dance, DO    PCP and phone number:  Kasia Jenkins DO [place]  [phone]    TECHNIQUE: 21 channels of EEG, 2 channels of EOG, and 1 channel of EKG were recorded utilizing the International 10/20 System.  The re

## 2021-11-14 ENCOUNTER — IMMUNIZATION (OUTPATIENT)
Dept: LAB | Facility: HOSPITAL | Age: 76
End: 2021-11-14
Attending: EMERGENCY MEDICINE
Payer: MEDICARE

## 2021-11-14 DIAGNOSIS — Z23 NEED FOR VACCINATION: Primary | ICD-10-CM

## 2021-11-14 PROCEDURE — 0004A SARSCOV2 VAC 30MCG/0.3ML IM: CPT

## 2021-11-22 ENCOUNTER — PATIENT OUTREACH (OUTPATIENT)
Dept: CASE MANAGEMENT | Age: 76
End: 2021-11-22

## 2021-11-22 NOTE — PROGRESS NOTES
Left message for patient regarding Chronic Care Management program.     [facility] Management   [address] [facility]   Phone: [phone]

## 2021-11-30 RX ORDER — LEVETIRACETAM 250 MG/1
TABLET ORAL
Qty: 180 TABLET | Refills: 0 | OUTPATIENT
Start: 2021-11-30

## 2021-11-30 RX ORDER — LEVETIRACETAM 250 MG/1
250 TABLET ORAL 2 TIMES DAILY
Qty: 60 TABLET | Refills: 0 | Status: CANCELLED | OUTPATIENT
Start: 2021-11-30 | End: 2021-12-30

## 2021-11-30 NOTE — PATIENT INSTRUCTIONS
-Start Keppra at low dose   -Follow up in 6 months or sooner if needed.     -If you develop sudden onset loss of vision, double vision, room spinning/world spinning sensation, inability to speak or understand others who are speaking to you, slurred speech,

## 2021-11-30 NOTE — PROGRESS NOTES
[facility] 37  [address] [facility], [address] [place] [address]  [phone]          Established  Follow Up Visit       Date: November 30, 2021  Patient Name: Beth Oliveros   MRN: [id number]  Primary physician: [place] [address] 20 mg by mouth nightly., Disp: , Rfl:   Glucosamine-Chondroit-Vit C-Mn (GLUCOSAMINE 1500 COMPLEX OR), Take by mouth., Disp: , Rfl:   Multiple Vitamins-Minerals (MULTI FOR HER 50+) Oral Tab, Take  by mouth., Disp: , Rfl:     No current facility-administered 1.02 mg/dL    eGFR       >=60    eGFR NON-AFR. AMERICAN      >=60    IMMUNOFIXATION       Essentially normal. . . .   Reviewed By:       Lachelle oRdney M.D.    ALPHA-TOCOPHEROL (VIT E) -MG/L      5.5 - 18.0 mg/L 14.5   GAMMA-TOCOPHEROL (V neuropathy, hip replacement and femoral ame replacement, risk vs benefit was discussed about seizure medication. She has 1 seizure risk factor in that she has had head trauma in the past.  She is agreeable to try Keppra.   Length dependent peripheral neuro

## 2021-12-02 ENCOUNTER — PATIENT OUTREACH (OUTPATIENT)
Dept: CASE MANAGEMENT | Age: 76
End: 2021-12-02

## 2021-12-02 DIAGNOSIS — F32.1 CURRENT MODERATE EPISODE OF MAJOR DEPRESSIVE DISORDER, UNSPECIFIED WHETHER RECURRENT (HCC): ICD-10-CM

## 2021-12-02 DIAGNOSIS — I10 PRIMARY HYPERTENSION: ICD-10-CM

## 2021-12-02 DIAGNOSIS — J45.20 MILD INTERMITTENT ASTHMA WITHOUT COMPLICATION: ICD-10-CM

## 2021-12-02 DIAGNOSIS — C40.22 MALIGNANT NEOPLASM OF LONG BONE OF LEFT LOWER EXTREMITY (HCC): ICD-10-CM

## 2021-12-02 DIAGNOSIS — F41.1 GAD (GENERALIZED ANXIETY DISORDER): ICD-10-CM

## 2021-12-02 NOTE — PROGRESS NOTES
Left message for patient to complete Initial assessment. [address] [facility]   [address] [facility]   Phone: [phone]

## 2021-12-02 NOTE — PROGRESS NOTES
Assessment:    I want to know a little about you. • What do you do for fun/hobbies? Spending time with Family  • Are you retired? Yes    Social support:  • Do you have someone you can turn to when you are very stressed and or need help?  Family  • Do you l exercises    Personal:  • Are you active on MyChart? Yes  • Do you have a preference in day/time to reach out to you monthly? Anytime    Next month I will follow up and we will discuss your health, health habits and goals to set for moving forward.      Madeleine Huggins charged a $40 No Show Fee. Important: 24 hour notice is required to cancel any appointment or you may be charged a $40 No Show Fee. Please notify your physician office.            [facility]

## 2021-12-07 ENCOUNTER — PATIENT MESSAGE (OUTPATIENT)
Dept: OPTOMETRY | Facility: CLINIC | Age: 76
End: 2021-12-07

## 2021-12-16 ENCOUNTER — OFFICE VISIT (OUTPATIENT)
Dept: FAMILY MEDICINE CLINIC | Facility: CLINIC | Age: 76
End: 2021-12-16
Payer: MEDICARE

## 2021-12-16 VITALS
BODY MASS INDEX: 24.94 KG/M2 | SYSTOLIC BLOOD PRESSURE: 120 MMHG | HEART RATE: 94 BPM | HEIGHT: 61 IN | DIASTOLIC BLOOD PRESSURE: 81 MMHG | WEIGHT: 132.13 LBS

## 2021-12-16 DIAGNOSIS — E03.8 OTHER SPECIFIED HYPOTHYROIDISM: ICD-10-CM

## 2021-12-16 DIAGNOSIS — I10 ESSENTIAL HYPERTENSION: Primary | ICD-10-CM

## 2021-12-16 PROCEDURE — 99213 OFFICE O/P EST LOW 20 MIN: CPT | Performed by: FAMILY MEDICINE

## 2021-12-16 NOTE — PROGRESS NOTES
Blood pressure 120/81, pulse 94, height 5' 1\" (1.549 m), weight 132 lb 2 oz (59.9 kg). Patient presents today following up for hypertension reports she feels well. Denies chest pain or dyspnea.     Objective patient comfortable no apparent distress

## 2021-12-30 DIAGNOSIS — R56.9 SEIZURE (HCC): Primary | ICD-10-CM

## 2021-12-31 PROCEDURE — 99490 CHRNC CARE MGMT STAFF 1ST 20: CPT

## 2022-01-02 ENCOUNTER — PATIENT MESSAGE (OUTPATIENT)
Dept: NEUROLOGY | Facility: CLINIC | Age: 77
End: 2022-01-02

## 2022-01-03 RX ORDER — LEVETIRACETAM 250 MG/1
TABLET ORAL
Qty: 60 TABLET | Refills: 3 | Status: SHIPPED | OUTPATIENT
Start: 2022-01-03

## 2022-01-03 NOTE — TELEPHONE ENCOUNTER
From: Yanique Angela  To: Rina Dutta DO  Sent: 1/2/2022 11:16 AM CST  Subject: Levetiracetam    [facility] needs your authorization to refill this. I just took my last pill. Can you contact them at [phone]. Thank you.  Manny Granados    My phone is (

## 2022-01-06 ENCOUNTER — PATIENT OUTREACH (OUTPATIENT)
Dept: CASE MANAGEMENT | Age: 77
End: 2022-01-06

## 2022-01-06 DIAGNOSIS — M94.9 DISORDER OF BONE AND CARTILAGE: ICD-10-CM

## 2022-01-06 DIAGNOSIS — F41.1 GAD (GENERALIZED ANXIETY DISORDER): ICD-10-CM

## 2022-01-06 DIAGNOSIS — Z85.3 HISTORY OF BREAST CANCER: ICD-10-CM

## 2022-01-06 DIAGNOSIS — C40.22 MALIGNANT NEOPLASM OF LONG BONE OF LEFT LOWER EXTREMITY (HCC): ICD-10-CM

## 2022-01-06 DIAGNOSIS — I10 PRIMARY HYPERTENSION: ICD-10-CM

## 2022-01-06 DIAGNOSIS — M89.9 DISORDER OF BONE AND CARTILAGE: ICD-10-CM

## 2022-01-06 DIAGNOSIS — J45.20 MILD INTERMITTENT ASTHMA WITHOUT COMPLICATION: ICD-10-CM

## 2022-01-06 DIAGNOSIS — F32.1 CURRENT MODERATE EPISODE OF MAJOR DEPRESSIVE DISORDER, UNSPECIFIED WHETHER RECURRENT (HCC): ICD-10-CM

## 2022-01-06 NOTE — PROGRESS NOTES
1/6/2022  Spoke to Sabrina for CCM. Updates to patient care team/ comments: Reviewed with patient, updated to show Oncologist Dr. Mckenna Andrew.    Patient reportedchanges in medications: None  Med Adherence  Comment: Taking as directed    Health Maintenance:  Re min medical record review, telephone communication, care plan updates where needed, education, goals and action plan recreation/update. Provided acknowledgment and validation to patient's concerns.    Monthly Minute Total including today: 22  Physical asses

## 2022-01-06 NOTE — PROGRESS NOTES
Contacting patient to follow up on CCM for the month. Left message to call back.      Total time - 1 min  Total Monthly time- 1 min

## 2022-01-14 NOTE — PROGRESS NOTES
[facility]  [address]  [phone]              Date: August 31, 2021  Patient Name: Jacky Rosales   MRN: [id number]    Reason for Evaluation: Syncope     HPI:     Jacky Rosales is a 70 year ol afterwards. No TB/UI or BI. Her blood pressures medications have been adjusted and she brought in a blood pressure log. She had more stress in July 2021 when her  was diagnosed with COVID despite vaccine.      Years ago, she had vision aura Julio Ward SURGICAL HISTORY  Past Surgical History:   Procedure Laterality Date   • BREAST LUMPECTOMY  04/2006    PER NG: BREAST CANCER LEFT SIDE SEES ONCOLOGY AT [facility] MAMMO   • BREAST SURGERY PROCEDURE UNLISTED  04/2006    per NG: Lt lymph node dise nerves bilaterally. Intact PP to V1. CN VII: normal facial muscle strength bilaterally  CN VIII: auditory acuity intact to bedside testing  CN IX/CN X: normal palate elevation  CN XI: normal strength of trapezius and SCM muscles bilaterally.   CN XII: ton 98 - 112 mmol/L 101    ISTAT IONIZED CALCIUM      1.12 - 1.32 mmol/L 1.32    ISTAT HEMATOCRIT      34 - 50 % 43    ISTAT GLUCOSE      70 - 99 mg/dL 107 (H)    ISTAT TCO2      21 - 32 mmol/L 25    ISTAT CREATININE      0.55 - 1.02 mg/dL 0.80    eGFR NON- without if able to (has left hip replacement)  –Small fiber neuropathy labs  –Routine EEG  –Future consideration: Keppra    Follow up: 3 months     Discussed indication, administration, dose, and side effects with patient of any medications personally pres I have personally seen and examined this patient. I have fully participated in the care of this patient. I have reviewed all pertinent clinical information, including history, physical exam, plan and the Medical/PA/NP Student's note and agree except as noted.

## 2022-01-31 PROCEDURE — 99490 CHRNC CARE MGMT STAFF 1ST 20: CPT

## 2022-02-10 ENCOUNTER — PATIENT OUTREACH (OUTPATIENT)
Dept: CASE MANAGEMENT | Age: 77
End: 2022-02-10

## 2022-02-22 RX ORDER — ESCITALOPRAM OXALATE 20 MG/1
20 TABLET ORAL DAILY
Qty: 90 TABLET | Refills: 1 | Status: SHIPPED | OUTPATIENT
Start: 2022-02-22

## 2022-02-22 NOTE — TELEPHONE ENCOUNTER
Refill passed per [place] [employer] protocol.     Requested Prescriptions   Pending Prescriptions Disp Refills    ESCITALOPRAM 20 MG Oral Tab [Pharmacy Med Name: ESCITALOPRAM 20MG TABLETS] 90 tablet 1     Sig: TAKE 1 TABLET(20 MG) BY MOUTH DAILY        Psychiatric Non-Scheduled (Anti-Anxiety) Passed - 2/22/2022  5:44 AM        Passed - Appointment in last 6 or next 3 months              Recent Outpatient Visits              2 months ago Essential hypertension    [facility], [address], DO    Office Visit    2 months ago [address] [facility], [address], [address] [place]    Office Visit    4 months ago Need for vaccination    [address] [facility],2Nd Floor Family Medicine    Nurse Only    5 months ago Medicare annual wellness visit, subsequent    [facility], [address], DO    Office Visit    5 months ago Loss of consciousness [facility])    [facility], [address], [address]    Office Visit

## 2022-02-28 PROCEDURE — 99490 CHRNC CARE MGMT STAFF 1ST 20: CPT

## 2022-02-28 RX ORDER — MONTELUKAST SODIUM 10 MG/1
10 TABLET ORAL DAILY
Qty: 90 TABLET | Refills: 1 | Status: SHIPPED | OUTPATIENT
Start: 2022-02-28

## 2022-02-28 NOTE — TELEPHONE ENCOUNTER
Refill passed per [place] [employer] protocol.   Requested Prescriptions   Pending Prescriptions Disp Refills    MONTELUKAST 10 MG Oral Tab [Pharmacy Med Name: MONTELUKAST 10MG TABLETS] 90 tablet 1     Sig: TAKE 1 TABLET(10 MG) BY MOUTH DAILY        Asthma & COPD Medication Protocol Passed - 2/28/2022 12:51 PM        Passed - Appointment in past 6 or next 3 months            Recent Outpatient Visits              2 months ago Essential hypertension    Jed Arreguin DO    Office Visit    3 months [facility] [place]    Office Visit    4 months ago Need for vaccination    [address] [facility],2Nd Floor Family Medicine    Nurse Only    5 months ago Medicare annual wellness visit, subsequent    Jed Arreguin DO    Office Visit    6 months ago Loss of consciousness [facility])    [address] [facility], [place]    Office Visit

## 2022-03-08 ENCOUNTER — PATIENT OUTREACH (OUTPATIENT)
Dept: CASE MANAGEMENT | Age: 77
End: 2022-03-08

## 2022-03-31 PROCEDURE — 99490 CHRNC CARE MGMT STAFF 1ST 20: CPT

## 2022-04-08 ENCOUNTER — PATIENT OUTREACH (OUTPATIENT)
Dept: CASE MANAGEMENT | Age: 77
End: 2022-04-08

## 2022-04-29 ENCOUNTER — TELEPHONE (OUTPATIENT)
Dept: FAMILY MEDICINE CLINIC | Facility: CLINIC | Age: 77
End: 2022-04-29

## 2022-04-29 NOTE — TELEPHONE ENCOUNTER
Dr. Berenice Echeverria, patient reports elevated BP readings in the 140's/90's the past 3 days. On Wednesday she had one episode of a  flash of light on her left eye. She was drinking Marcelo tea up to 3 cups a day and chocolates, patient feels this may be the issue. She is wondering if her Lisinopril should be adjusted, currently she is taking Lisinopril 10 mg. Patient denies any headaches, nausea, chest pain, dizziness, or SOB. Please advise.

## 2022-04-29 NOTE — TELEPHONE ENCOUNTER
Patient informed of msg below( identified name and ) and recommendations.  Patient voiced understanding   Scheduled appt for 22

## 2022-04-29 NOTE — TELEPHONE ENCOUNTER
See previous message also patient to go to ER if flashlight recurs also she should schedule appointment with ophthalmology.

## 2022-05-02 ENCOUNTER — OFFICE VISIT (OUTPATIENT)
Dept: FAMILY MEDICINE CLINIC | Facility: CLINIC | Age: 77
End: 2022-05-02
Payer: MEDICARE

## 2022-05-02 VITALS
SYSTOLIC BLOOD PRESSURE: 121 MMHG | HEIGHT: 61 IN | HEART RATE: 101 BPM | BODY MASS INDEX: 24.39 KG/M2 | DIASTOLIC BLOOD PRESSURE: 78 MMHG | WEIGHT: 129.19 LBS

## 2022-05-02 DIAGNOSIS — F32.89 OTHER DEPRESSION: ICD-10-CM

## 2022-05-02 DIAGNOSIS — I10 ESSENTIAL HYPERTENSION: Primary | ICD-10-CM

## 2022-05-02 DIAGNOSIS — Z12.11 COLON CANCER SCREENING: ICD-10-CM

## 2022-05-02 PROCEDURE — 99213 OFFICE O/P EST LOW 20 MIN: CPT | Performed by: FAMILY MEDICINE

## 2022-05-02 RX ORDER — LEVETIRACETAM 250 MG/1
TABLET ORAL
Qty: 180 TABLET | Refills: 0 | OUTPATIENT
Start: 2022-05-02

## 2022-05-02 RX ORDER — LEVETIRACETAM 250 MG/1
TABLET ORAL
Qty: 60 TABLET | Refills: 1 | Status: SHIPPED | OUTPATIENT
Start: 2022-05-02

## 2022-05-02 NOTE — PROGRESS NOTES
Blood pressure 121/78, pulse 101, height 5' 1\" (1.549 m), weight 129 lb 3 oz (58.6 kg). Patient presents today following up for hypertension denies chest pain or dyspnea. Has not had colonoscopy for 12 years. Refuses to get it. Objective patient is comfortable no apparent distress    Assessment hypertension and colon cancer screening    Plan continue present medications    Cologuard and FIT testing ordered    Follow-up for Medicare annual physical.    Patient has mammogram scheduled at [facility] facility.

## 2022-05-02 NOTE — TELEPHONE ENCOUNTER
Refill request for keppra 250 mg, BID, #60, 1 refill    LOV: 11/30/21  NOV: 6/8/22  Last refilled on 1/3/22 with 3 refills

## 2022-05-03 ENCOUNTER — PATIENT OUTREACH (OUTPATIENT)
Dept: CASE MANAGEMENT | Age: 77
End: 2022-05-03

## 2022-05-10 ENCOUNTER — TELEPHONE (OUTPATIENT)
Dept: FAMILY MEDICINE CLINIC | Facility: CLINIC | Age: 77
End: 2022-05-10

## 2022-05-10 NOTE — TELEPHONE ENCOUNTER
Spoke to patient and informed there is no order form in Media. It may be taking scanning dept. Longer to put forms in epic. Will wait until the end of week to see if signed cologuard form shows up under media so we can refax to cologuard. If no form uploaded by end of week. Please call patient so she can come sign a new form for cologuard.

## 2022-05-10 NOTE — TELEPHONE ENCOUNTER
Patient has not received [facility] Kit, states it was ordered on 05/03/2022. Her husbands ordered was generated the same day and received his kit on Friday. [phone], spoke to Myriam Lerner who states order was not received for patient.  Clinical staff please assist.

## 2022-05-18 NOTE — TELEPHONE ENCOUNTER
Patient following up on Cologuard order form. No scanned forms found under Media. Patient will be stopping by the office tomorrow to sign a new form.  Please assist.

## 2022-05-19 ENCOUNTER — LAB ENCOUNTER (OUTPATIENT)
Dept: LAB | Age: 77
End: 2022-05-19
Attending: FAMILY MEDICINE
Payer: MEDICARE

## 2022-05-19 ENCOUNTER — TELEPHONE (OUTPATIENT)
Dept: FAMILY MEDICINE CLINIC | Facility: CLINIC | Age: 77
End: 2022-05-19

## 2022-05-24 ENCOUNTER — PATIENT OUTREACH (OUTPATIENT)
Dept: CASE MANAGEMENT | Age: 77
End: 2022-05-24

## 2022-05-24 NOTE — PROGRESS NOTES
Patient called to inform of Cologuard form completed by clinical staff, she will wait for kit. If not received she will notify me.      Total time -  2 min  Total Monthly time-  34 min

## 2022-05-31 RX ORDER — PRAVASTATIN SODIUM 40 MG
40 TABLET ORAL DAILY
Qty: 90 TABLET | Refills: 1 | Status: SHIPPED | OUTPATIENT
Start: 2022-05-31

## 2022-06-01 NOTE — TELEPHONE ENCOUNTER
Refill passed per [facility], [facility] protocol. Requested Prescriptions   Pending Prescriptions Disp Refills    pravastatin 40 MG Oral Tab 90 tablet 1     Sig: Take 1 tablet (40 mg total) by mouth daily. Cholesterol Medication Protocol Passed - 5/31/2022 12:53 PM        Passed - ALT in past 12 months        Passed - LDL in past 12 months        Passed - Last ALT < 80       Lab Results   Component Value Date    ALT 19 09/21/2021             Passed - Last LDL < 130     Lab Results   Component Value Date    LDL 90 09/21/2021               Passed - Appointment in past 12 or next 3 months            Recent Outpatient Visits              4 weeks ago Essential hypertension    Brian Arreguin, DO    Office Visit    5 months ago Essential hypertension    Brian Arreguin, DO    Office Visit    6 months ago [address] [place], [place]    Office Visit    7 months ago Need for vaccination    [facility] Medicine    Nurse Only    8 months ago Medicare annual wellness visit, subsequent    Mariah Garcia, Brian Roberts, DO    Office Visit          Future Appointments         Provider Department Appt Notes    In 1 week Marivel Grover, Carla Bui Follow up, review meds.

## 2022-06-02 ENCOUNTER — PATIENT OUTREACH (OUTPATIENT)
Dept: CASE MANAGEMENT | Age: 77
End: 2022-06-02

## 2022-06-02 DIAGNOSIS — F41.1 GAD (GENERALIZED ANXIETY DISORDER): ICD-10-CM

## 2022-06-02 DIAGNOSIS — C40.22 MALIGNANT NEOPLASM OF LONG BONE OF LEFT LOWER EXTREMITY (HCC): ICD-10-CM

## 2022-06-02 DIAGNOSIS — F32.1 CURRENT MODERATE EPISODE OF MAJOR DEPRESSIVE DISORDER, UNSPECIFIED WHETHER RECURRENT (HCC): ICD-10-CM

## 2022-06-02 DIAGNOSIS — I10 PRIMARY HYPERTENSION: Primary | ICD-10-CM

## 2022-06-08 ENCOUNTER — OFFICE VISIT (OUTPATIENT)
Dept: NEUROLOGY | Facility: CLINIC | Age: 77
End: 2022-06-08
Payer: MEDICARE

## 2022-06-08 VITALS
HEART RATE: 88 BPM | HEIGHT: 61 IN | SYSTOLIC BLOOD PRESSURE: 122 MMHG | WEIGHT: 129 LBS | DIASTOLIC BLOOD PRESSURE: 68 MMHG | BODY MASS INDEX: 24.35 KG/M2

## 2022-06-08 DIAGNOSIS — F41.9 ANXIETY: ICD-10-CM

## 2022-06-08 DIAGNOSIS — R56.9 SEIZURE (HCC): ICD-10-CM

## 2022-06-08 DIAGNOSIS — G62.9 LENGTH-DEPENDENT PERIPHERAL NEUROPATHY: Primary | ICD-10-CM

## 2022-06-08 PROCEDURE — 99214 OFFICE O/P EST MOD 30 MIN: CPT | Performed by: OTHER

## 2022-06-08 RX ORDER — LEVETIRACETAM 250 MG/1
250 TABLET ORAL 2 TIMES DAILY
Qty: 180 TABLET | Refills: 3 | Status: SHIPPED | OUTPATIENT
Start: 2022-06-08 | End: 2022-09-06

## 2022-06-08 NOTE — PATIENT INSTRUCTIONS
-Continue Keppra   -Follow up in 6 months or sooner if needed. -If you develop sudden onset loss of vision, double vision, room spinning/world spinning sensation, inability to speak or understand others who are speaking to you, slurred speech, balance problems, weakness on one side of your body, numbness on one side of your body or worst headache you ever had, please seek out emergency medical attention via 911 for the nearest emergency room to be evaluated for possible stroke. -MyChart or call office with any questions or concerns. Thank you for coming to see me today. The easiest way to communicate with me is via Yoovit. Yoovit is meant for simple questions regarding medications, possible side effects, or other simple straight forward questions in limited sentences, rather than multiple paragraphs of discussion. NemeriX is not meant for, or efficient for these complex questions, extensive questions, extensive medication adjustments, complex new symptoms or concerns. These issues beyond simple questions require a follow up visit with myself. Refills:  Please pay attention to when your refills will need to be renewed. Due to the volume of phone calls daily, this could potentially take a few days, although we certainly try to honor your refill requests as soon as we can. You should call at least 1 week in advance of needing a refill to ensure you do not run out of medication. Keep in mind that refill requests on Fridays may not be filled until the following week.

## 2022-07-07 ENCOUNTER — PATIENT OUTREACH (OUTPATIENT)
Dept: CASE MANAGEMENT | Age: 77
End: 2022-07-07

## 2022-07-07 ENCOUNTER — NURSE TRIAGE (OUTPATIENT)
Dept: FAMILY MEDICINE CLINIC | Facility: CLINIC | Age: 77
End: 2022-07-07

## 2022-07-07 DIAGNOSIS — M89.9 DISORDER OF BONE AND CARTILAGE: ICD-10-CM

## 2022-07-07 DIAGNOSIS — M94.9 DISORDER OF BONE AND CARTILAGE: ICD-10-CM

## 2022-07-07 DIAGNOSIS — J45.20 MILD INTERMITTENT ASTHMA WITHOUT COMPLICATION: ICD-10-CM

## 2022-07-07 DIAGNOSIS — F32.1 CURRENT MODERATE EPISODE OF MAJOR DEPRESSIVE DISORDER, UNSPECIFIED WHETHER RECURRENT (HCC): ICD-10-CM

## 2022-07-07 DIAGNOSIS — I10 PRIMARY HYPERTENSION: Primary | ICD-10-CM

## 2022-07-08 ENCOUNTER — HOSPITAL ENCOUNTER (OUTPATIENT)
Dept: ULTRASOUND IMAGING | Facility: HOSPITAL | Age: 77
Discharge: HOME | End: 2022-07-08
Attending: FAMILY MEDICINE
Payer: MEDICARE

## 2022-07-08 ENCOUNTER — OFFICE VISIT (OUTPATIENT)
Dept: FAMILY MEDICINE CLINIC | Facility: CLINIC | Age: 77
End: 2022-07-08
Payer: MEDICARE

## 2022-07-08 VITALS
HEART RATE: 91 BPM | DIASTOLIC BLOOD PRESSURE: 74 MMHG | WEIGHT: 129 LBS | HEIGHT: 61 IN | BODY MASS INDEX: 24.35 KG/M2 | SYSTOLIC BLOOD PRESSURE: 112 MMHG

## 2022-07-08 DIAGNOSIS — R60.0 LEG EDEMA, LEFT: Primary | ICD-10-CM

## 2022-07-08 DIAGNOSIS — R60.0 LEG EDEMA, LEFT: ICD-10-CM

## 2022-07-08 PROCEDURE — 99213 OFFICE O/P EST LOW 20 MIN: CPT | Performed by: FAMILY MEDICINE

## 2022-07-08 PROCEDURE — 1126F AMNT PAIN NOTED NONE PRSNT: CPT | Performed by: FAMILY MEDICINE

## 2022-07-08 PROCEDURE — 93971 EXTREMITY STUDY: CPT | Performed by: FAMILY MEDICINE

## 2022-07-08 NOTE — PROGRESS NOTES
Blood pressure 112/74, pulse 91, height 5' 1\" (1.549 m), weight 129 lb (58.5 kg). 2 days of left leg swelling. No pain. Left hip and left thigh surgery with hardware placement. Also history of breast cancer. History of DVT in the lower extremity previously.     Objective calf circumference left leg 35 cm calf circumference right leg 34 cm negative Homans' sign bilaterally    Feet with good pulses intact skin    Assessment left lower extremity edema    Plan stat venous Doppler ultrasound ordered    Fasting blood test order given    Follow-up for Medicare annual physical

## 2022-07-09 ENCOUNTER — TELEPHONE (OUTPATIENT)
Dept: FAMILY MEDICINE CLINIC | Facility: CLINIC | Age: 77
End: 2022-07-09

## 2022-07-12 ENCOUNTER — PATIENT MESSAGE (OUTPATIENT)
Dept: FAMILY MEDICINE CLINIC | Facility: CLINIC | Age: 77
End: 2022-07-12

## 2022-07-12 NOTE — TELEPHONE ENCOUNTER
Pt has her mammogram done through [facility] and this year will be done at [facility] in [facility]. She doesn't need an order from us, she is just informing us.

## 2022-07-26 ENCOUNTER — LAB ENCOUNTER (OUTPATIENT)
Dept: LAB | Age: 77
End: 2022-07-26
Attending: FAMILY MEDICINE
Payer: MEDICARE

## 2022-07-26 DIAGNOSIS — E03.8 OTHER SPECIFIED HYPOTHYROIDISM: ICD-10-CM

## 2022-07-26 DIAGNOSIS — I10 ESSENTIAL HYPERTENSION: ICD-10-CM

## 2022-07-26 LAB
ALBUMIN SERPL-MCNC: 3.8 G/DL (ref 3.4–5)
ALBUMIN/GLOB SERPL: 1.2 {RATIO} (ref 1–2)
ALP LIVER SERPL-CCNC: 70 U/L
ALT SERPL-CCNC: 23 U/L
ANION GAP SERPL CALC-SCNC: 9 MMOL/L (ref 0–18)
AST SERPL-CCNC: 16 U/L (ref 15–37)
BILIRUB SERPL-MCNC: 1.1 MG/DL (ref 0.1–2)
BUN BLD-MCNC: 13 MG/DL (ref 7–18)
BUN/CREAT SERPL: 16.9 (ref 10–20)
CALCIUM BLD-MCNC: 10.4 MG/DL (ref 8.5–10.1)
CHLORIDE SERPL-SCNC: 107 MMOL/L (ref 98–112)
CHOLEST SERPL-MCNC: 168 MG/DL (ref ?–200)
CO2 SERPL-SCNC: 26 MMOL/L (ref 21–32)
CREAT BLD-MCNC: 0.77 MG/DL
FASTING PATIENT LIPID ANSWER: YES
FASTING STATUS PATIENT QL REPORTED: YES
GLOBULIN PLAS-MCNC: 3.1 G/DL (ref 2.8–4.4)
GLUCOSE BLD-MCNC: 98 MG/DL (ref 70–99)
HDLC SERPL-MCNC: 41 MG/DL (ref 40–59)
LDLC SERPL CALC-MCNC: 105 MG/DL (ref ?–100)
NONHDLC SERPL-MCNC: 127 MG/DL (ref ?–130)
OSMOLALITY SERPL CALC.SUM OF ELEC: 294 MOSM/KG (ref 275–295)
POTASSIUM SERPL-SCNC: 4.4 MMOL/L (ref 3.5–5.1)
PROT SERPL-MCNC: 6.9 G/DL (ref 6.4–8.2)
SODIUM SERPL-SCNC: 142 MMOL/L (ref 136–145)
TRIGL SERPL-MCNC: 120 MG/DL (ref 30–149)
TSI SER-ACNC: 4.33 MIU/ML (ref 0.36–3.74)
VLDLC SERPL CALC-MCNC: 20 MG/DL (ref 0–30)

## 2022-07-26 PROCEDURE — 80061 LIPID PANEL: CPT

## 2022-07-26 PROCEDURE — 80053 COMPREHEN METABOLIC PANEL: CPT

## 2022-07-26 PROCEDURE — 84443 ASSAY THYROID STIM HORMONE: CPT

## 2022-07-26 PROCEDURE — 36415 COLL VENOUS BLD VENIPUNCTURE: CPT

## 2022-08-09 ENCOUNTER — TELEPHONE (OUTPATIENT)
Dept: FAMILY MEDICINE CLINIC | Facility: CLINIC | Age: 77
End: 2022-08-09

## 2022-08-09 ENCOUNTER — PATIENT OUTREACH (OUTPATIENT)
Dept: CASE MANAGEMENT | Age: 77
End: 2022-08-09

## 2022-08-09 DIAGNOSIS — J45.20 MILD INTERMITTENT ASTHMA WITHOUT COMPLICATION: ICD-10-CM

## 2022-08-09 DIAGNOSIS — I10 PRIMARY HYPERTENSION: Primary | ICD-10-CM

## 2022-08-09 DIAGNOSIS — E03.8 OTHER SPECIFIED HYPOTHYROIDISM: Primary | ICD-10-CM

## 2022-08-09 DIAGNOSIS — F32.1 CURRENT MODERATE EPISODE OF MAJOR DEPRESSIVE DISORDER, UNSPECIFIED WHETHER RECURRENT (HCC): ICD-10-CM

## 2022-08-09 DIAGNOSIS — M89.9 DISORDER OF BONE AND CARTILAGE: ICD-10-CM

## 2022-08-09 DIAGNOSIS — M94.9 DISORDER OF BONE AND CARTILAGE: ICD-10-CM

## 2022-08-22 RX ORDER — ESCITALOPRAM OXALATE 20 MG/1
20 TABLET ORAL DAILY
Qty: 90 TABLET | Refills: 3 | Status: SHIPPED | OUTPATIENT
Start: 2022-08-22 | End: 2023-08-16

## 2022-09-12 ENCOUNTER — LAB ENCOUNTER (OUTPATIENT)
Dept: LAB | Age: 77
End: 2022-09-12
Attending: FAMILY MEDICINE
Payer: MEDICARE

## 2022-09-12 DIAGNOSIS — E03.8 OTHER SPECIFIED HYPOTHYROIDISM: ICD-10-CM

## 2022-09-12 LAB — TSI SER-ACNC: 3.35 MIU/ML (ref 0.36–3.74)

## 2022-09-12 PROCEDURE — 36415 COLL VENOUS BLD VENIPUNCTURE: CPT

## 2022-09-12 PROCEDURE — 84443 ASSAY THYROID STIM HORMONE: CPT

## 2022-09-14 ENCOUNTER — PATIENT OUTREACH (OUTPATIENT)
Dept: CASE MANAGEMENT | Age: 77
End: 2022-09-14

## 2022-09-19 ENCOUNTER — OFFICE VISIT (OUTPATIENT)
Dept: FAMILY MEDICINE CLINIC | Facility: CLINIC | Age: 77
End: 2022-09-19
Payer: MEDICARE

## 2022-09-19 ENCOUNTER — PATIENT MESSAGE (OUTPATIENT)
Dept: FAMILY MEDICINE CLINIC | Facility: CLINIC | Age: 77
End: 2022-09-19

## 2022-09-19 ENCOUNTER — LAB ENCOUNTER (OUTPATIENT)
Dept: LAB | Age: 77
End: 2022-09-19
Attending: FAMILY MEDICINE

## 2022-09-19 VITALS
HEIGHT: 61 IN | DIASTOLIC BLOOD PRESSURE: 80 MMHG | HEART RATE: 101 BPM | SYSTOLIC BLOOD PRESSURE: 115 MMHG | BODY MASS INDEX: 23.98 KG/M2 | WEIGHT: 127 LBS

## 2022-09-19 DIAGNOSIS — Z00.00 MEDICARE ANNUAL WELLNESS VISIT, SUBSEQUENT: Primary | ICD-10-CM

## 2022-09-19 DIAGNOSIS — F32.89 OTHER DEPRESSION: ICD-10-CM

## 2022-09-19 DIAGNOSIS — E03.9 HYPOTHYROIDISM, UNSPECIFIED TYPE: ICD-10-CM

## 2022-09-19 DIAGNOSIS — J45.20 MILD INTERMITTENT ASTHMA WITHOUT COMPLICATION: ICD-10-CM

## 2022-09-19 DIAGNOSIS — Z85.3 HISTORY OF BREAST CANCER: ICD-10-CM

## 2022-09-19 DIAGNOSIS — H61.21 IMPACTED CERUMEN OF RIGHT EAR: ICD-10-CM

## 2022-09-19 DIAGNOSIS — I10 PRIMARY HYPERTENSION: ICD-10-CM

## 2022-09-19 DIAGNOSIS — R91.1 PULMONARY NODULE: ICD-10-CM

## 2022-09-19 DIAGNOSIS — Z00.00 ENCOUNTER FOR ANNUAL HEALTH EXAMINATION: ICD-10-CM

## 2022-09-19 DIAGNOSIS — I77.1 TORTUOUS AORTA (HCC): ICD-10-CM

## 2022-09-19 DIAGNOSIS — F41.1 GAD (GENERALIZED ANXIETY DISORDER): ICD-10-CM

## 2022-09-19 DIAGNOSIS — H91.91 HEARING LOSS OF RIGHT EAR, UNSPECIFIED HEARING LOSS TYPE: ICD-10-CM

## 2022-09-19 DIAGNOSIS — Z00.00 MEDICARE ANNUAL WELLNESS VISIT, SUBSEQUENT: ICD-10-CM

## 2022-09-19 PROCEDURE — 1125F AMNT PAIN NOTED PAIN PRSNT: CPT | Performed by: FAMILY MEDICINE

## 2022-09-19 PROCEDURE — 93010 ELECTROCARDIOGRAM REPORT: CPT | Performed by: FAMILY MEDICINE

## 2022-09-19 PROCEDURE — 93005 ELECTROCARDIOGRAM TRACING: CPT

## 2022-09-19 PROCEDURE — G0439 PPPS, SUBSEQ VISIT: HCPCS | Performed by: FAMILY MEDICINE

## 2022-09-19 RX ORDER — LEVETIRACETAM 250 MG/1
250 TABLET ORAL 2 TIMES DAILY
Qty: 60 TABLET | Refills: 0 | COMMUNITY
Start: 2022-09-19

## 2022-09-19 RX ORDER — CALCIUM CARBONATE 600 MG
600 TABLET ORAL 2 TIMES DAILY
Qty: 180 TABLET | Refills: 3 | Status: SHIPPED | OUTPATIENT
Start: 2022-09-19

## 2022-09-30 ENCOUNTER — HOSPITAL ENCOUNTER (OUTPATIENT)
Dept: CT IMAGING | Facility: HOSPITAL | Age: 77
Discharge: HOME | End: 2022-09-30
Attending: FAMILY MEDICINE
Payer: MEDICARE

## 2022-09-30 DIAGNOSIS — R91.1 PULMONARY NODULE: ICD-10-CM

## 2022-09-30 LAB
CREAT BLD-MCNC: 0.6 MG/DL
GFR SERPLBLD BASED ON 1.73 SQ M-ARVRAT: 94 ML/MIN/1.73M2 (ref 60–?)

## 2022-09-30 PROCEDURE — 71260 CT THORAX DX C+: CPT | Performed by: FAMILY MEDICINE

## 2022-09-30 PROCEDURE — 82565 ASSAY OF CREATININE: CPT

## 2022-10-07 ENCOUNTER — PATIENT OUTREACH (OUTPATIENT)
Dept: CASE MANAGEMENT | Age: 77
End: 2022-10-07

## 2022-10-07 ENCOUNTER — TELEPHONE (OUTPATIENT)
Dept: FAMILY MEDICINE CLINIC | Facility: CLINIC | Age: 77
End: 2022-10-07

## 2022-10-07 DIAGNOSIS — F41.1 GAD (GENERALIZED ANXIETY DISORDER): ICD-10-CM

## 2022-10-07 DIAGNOSIS — M94.9 DISORDER OF BONE AND CARTILAGE: ICD-10-CM

## 2022-10-07 DIAGNOSIS — I10 PRIMARY HYPERTENSION: Primary | ICD-10-CM

## 2022-10-07 DIAGNOSIS — J45.20 MILD INTERMITTENT ASTHMA WITHOUT COMPLICATION: ICD-10-CM

## 2022-10-07 DIAGNOSIS — M89.9 DISORDER OF BONE AND CARTILAGE: ICD-10-CM

## 2022-10-07 DIAGNOSIS — F32.1 CURRENT MODERATE EPISODE OF MAJOR DEPRESSIVE DISORDER, UNSPECIFIED WHETHER RECURRENT (HCC): ICD-10-CM

## 2022-10-07 NOTE — TELEPHONE ENCOUNTER
Dr. Carli Ludwig, Patient would like to know if there is a prescribed alternative for OTC Prevagen due to out of pocket for a 30-day supply is over $40.

## 2022-10-11 ENCOUNTER — OFFICE VISIT (OUTPATIENT)
Dept: OTOLARYNGOLOGY | Facility: CLINIC | Age: 77
End: 2022-10-11
Payer: MEDICARE

## 2022-10-11 VITALS — BODY MASS INDEX: 23.98 KG/M2 | HEIGHT: 61 IN | TEMPERATURE: 98 F | WEIGHT: 127 LBS

## 2022-10-11 DIAGNOSIS — H61.21 CERUMEN DEBRIS ON TYMPANIC MEMBRANE, RIGHT: Primary | ICD-10-CM

## 2022-10-11 PROCEDURE — 69210 REMOVE IMPACTED EAR WAX UNI: CPT | Performed by: SPECIALIST

## 2022-10-11 NOTE — PROGRESS NOTES
Ears = right cerumen occlussion. Fully cleaned under microscope using instrumentation and suctioning. Normal tympanic membranes bilaterally.

## 2022-10-24 ENCOUNTER — OFFICE VISIT (OUTPATIENT)
Dept: AUDIOLOGY | Facility: CLINIC | Age: 77
End: 2022-10-24
Payer: MEDICARE

## 2022-10-24 DIAGNOSIS — H90.3 SENSORINEURAL HEARING LOSS (SNHL) OF BOTH EARS: Primary | ICD-10-CM

## 2022-10-24 PROCEDURE — 92557 COMPREHENSIVE HEARING TEST: CPT | Performed by: AUDIOLOGIST

## 2022-10-24 PROCEDURE — 92567 TYMPANOMETRY: CPT | Performed by: AUDIOLOGIST

## 2022-11-02 ENCOUNTER — PATIENT OUTREACH (OUTPATIENT)
Dept: CASE MANAGEMENT | Age: 77
End: 2022-11-02

## 2022-11-02 ENCOUNTER — TELEPHONE (OUTPATIENT)
Dept: FAMILY MEDICINE CLINIC | Facility: CLINIC | Age: 77
End: 2022-11-02

## 2022-11-02 DIAGNOSIS — F41.1 GAD (GENERALIZED ANXIETY DISORDER): ICD-10-CM

## 2022-11-02 DIAGNOSIS — F32.1 CURRENT MODERATE EPISODE OF MAJOR DEPRESSIVE DISORDER, UNSPECIFIED WHETHER RECURRENT (HCC): ICD-10-CM

## 2022-11-02 DIAGNOSIS — M94.9 DISORDER OF BONE AND CARTILAGE: ICD-10-CM

## 2022-11-02 DIAGNOSIS — I10 PRIMARY HYPERTENSION: Primary | ICD-10-CM

## 2022-11-02 DIAGNOSIS — M89.9 DISORDER OF BONE AND CARTILAGE: ICD-10-CM

## 2022-11-02 DIAGNOSIS — J45.20 MILD INTERMITTENT ASTHMA WITHOUT COMPLICATION: ICD-10-CM

## 2022-11-03 ENCOUNTER — TELEPHONE (OUTPATIENT)
Dept: FAMILY MEDICINE CLINIC | Facility: CLINIC | Age: 77
End: 2022-11-03

## 2022-11-03 DIAGNOSIS — Z01.818 PRE-OP EVALUATION: Primary | ICD-10-CM

## 2022-11-03 RX ORDER — LISINOPRIL 10 MG/1
10 TABLET ORAL DAILY
Qty: 90 TABLET | Refills: 1 | Status: SHIPPED | OUTPATIENT
Start: 2022-11-03

## 2022-11-19 RX ORDER — PRAVASTATIN SODIUM 40 MG
40 TABLET ORAL DAILY
Qty: 90 TABLET | Refills: 1 | Status: SHIPPED | OUTPATIENT
Start: 2022-11-19

## 2022-11-19 NOTE — TELEPHONE ENCOUNTER
Refill passed per [facility] protocol   Requested Prescriptions   Pending Prescriptions Disp Refills    PRAVASTATIN 40 MG Oral Tab [Pharmacy Med Name: PRAVASTATIN 40MG TABLETS] 90 tablet 1     Sig: TAKE 1 TABLET(40 MG) BY MOUTH DAILY       Cholesterol Medication Protocol Passed - 11/19/2022  5:49 AM        Passed - ALT in past 12 months        Passed - LDL in past 12 months        Passed - Last ALT < 80     Lab Results   Component Value Date    ALT 23 07/26/2022             Passed - Last LDL < 130     Lab Results   Component Value Date     (H) 07/26/2022             Passed - In person appointment or virtual visit in the past 12 mos or appointment in next 3 mos     Recent Outpatient Visits              3 weeks ago Sensorineural hearing loss (SNHL) of both [facility] [address] [place] [address] Audiology NAGELLA Molina    Office Visit    1 month ago Cerumen debris on tympanic membrane, right    Mitcheal Levo, Lilliam Ureña MD    Office Visit    2 months ago Medicare annual wellness visit, subsequent    Charla Arreguin DO    Office Visit    4 months ago Leg edema, left    Charla Arreguin DO    Office Visit    5 months ago Length-dependent peripheral neuropathy    [address] [facility], [place], [place]    Office Visit          Future Appointments         Provider Department Appt Notes    In 1 week [address] [facility], [address]     In 1 month DO Celine Aguirre checkup.

## 2022-11-21 RX ORDER — MONTELUKAST SODIUM 10 MG/1
10 TABLET ORAL DAILY
Qty: 90 TABLET | Refills: 1 | Status: SHIPPED | OUTPATIENT
Start: 2022-11-21

## 2022-11-21 NOTE — TELEPHONE ENCOUNTER
Refill passed per [employer] [employer] protocol. Requested Prescriptions   Pending Prescriptions Disp Refills    montelukast 10 MG Oral Tab 90 tablet 1     Sig: Take 1 tablet (10 mg total) by mouth daily. Asthma & COPD Medication Protocol Passed - 11/21/2022  9:24 AM        Passed - In person appointment or virtual visit in the past 6 mos or appointment in next 3 mos     Recent Outpatient Visits              4 weeks ago Sensorineural hearing loss (SNHL) of both [facility] Aud[facility] ANGELLA Pop    Office Visit    1 month ago Cerumen debris on tympanic membrane, right    [address] Arsenio Sharma MD    Office Visit    2 months ago Medicare annual wellness visit, subsequent    [facility], [address], DO    Office Visit    4 months ago Leg edema, left    [facility], [address], DO    Office Visit    5 months ago Length-dependent peripheral neuropathy    [address], Eather Miracle, [facility] Visit          Future Appointments         Provider Department Appt Notes    In 6 days [id number], [address]     In 1 month Arik Myers DO Celine checkup. Future Appointments         Provider Department Appt Notes    In 6 days [address], [place], [address]     In 1 month [place], Eather Miracle, DO Celine checkup.             Recent Outpatient Visits              4 weeks ago Sensorineural hearing loss (SNHL) of both [facility] Audiology ANGELLA Pop    Office Visit    1 month ago Cerumen debris on tympanic membrane, right    [facility] [employer], [address], [address] [facility], Jacque Capps MD    Office Visit    2 months ago Medicare annual wellness visit, subsequent    [facility], [address], DO    Office Visit    4 months ago Leg edema, left    [facility] [address], [address], DO    Office Visit    5 months ago Length-dependent peripheral neuropathy    [address] [facility], [address] [place], [place]    Office Visit

## 2022-11-27 ENCOUNTER — IMMUNIZATION (OUTPATIENT)
Dept: LAB | Age: 77
End: 2022-11-27
Attending: EMERGENCY MEDICINE
Payer: MEDICARE

## 2022-11-27 DIAGNOSIS — Z23 NEED FOR VACCINATION: Primary | ICD-10-CM

## 2022-11-27 PROCEDURE — 0124A SARSCOV2 VAC BVL 30MCG/0.3ML: CPT

## 2022-12-05 ENCOUNTER — PATIENT OUTREACH (OUTPATIENT)
Dept: CASE MANAGEMENT | Age: 77
End: 2022-12-05

## 2022-12-05 DIAGNOSIS — M94.9 DISORDER OF BONE AND CARTILAGE: ICD-10-CM

## 2022-12-05 DIAGNOSIS — I10 PRIMARY HYPERTENSION: Primary | ICD-10-CM

## 2022-12-05 DIAGNOSIS — F32.1 CURRENT MODERATE EPISODE OF MAJOR DEPRESSIVE DISORDER, UNSPECIFIED WHETHER RECURRENT (HCC): ICD-10-CM

## 2022-12-05 DIAGNOSIS — J45.20 MILD INTERMITTENT ASTHMA WITHOUT COMPLICATION: ICD-10-CM

## 2022-12-05 DIAGNOSIS — M89.9 DISORDER OF BONE AND CARTILAGE: ICD-10-CM

## 2022-12-05 DIAGNOSIS — F41.1 GAD (GENERALIZED ANXIETY DISORDER): ICD-10-CM

## 2023-01-03 ENCOUNTER — OFFICE VISIT (OUTPATIENT)
Dept: NEUROLOGY | Facility: CLINIC | Age: 78
End: 2023-01-03
Payer: MEDICARE

## 2023-01-03 VITALS — OXYGEN SATURATION: 99 % | HEART RATE: 95 BPM | DIASTOLIC BLOOD PRESSURE: 68 MMHG | SYSTOLIC BLOOD PRESSURE: 106 MMHG

## 2023-01-03 DIAGNOSIS — F41.9 ANXIETY AND DEPRESSION: ICD-10-CM

## 2023-01-03 DIAGNOSIS — F32.A ANXIETY AND DEPRESSION: ICD-10-CM

## 2023-01-03 DIAGNOSIS — G40.802 EPILEPSY WITH BOTH GENERALIZED AND FOCAL FEATURES (HCC): Primary | ICD-10-CM

## 2023-01-03 DIAGNOSIS — G62.9 LENGTH-DEPENDENT PERIPHERAL NEUROPATHY: ICD-10-CM

## 2023-01-03 PROCEDURE — 99214 OFFICE O/P EST MOD 30 MIN: CPT | Performed by: OTHER

## 2023-01-03 RX ORDER — FEXOFENADINE HCL 180 MG/1
1 TABLET ORAL DAILY PRN
COMMUNITY
Start: 2021-07-27

## 2023-01-03 RX ORDER — LAMOTRIGINE 25 MG/1
TABLET ORAL
Qty: 170 TABLET | Refills: 0 | Status: SHIPPED | OUTPATIENT
Start: 2023-01-03

## 2023-01-03 RX ORDER — LAMOTRIGINE 100 MG/1
TABLET ORAL
Qty: 60 TABLET | Refills: 0 | Status: SHIPPED | OUTPATIENT
Start: 2023-01-03

## 2023-01-03 NOTE — PATIENT INSTRUCTIONS
Taper Keppra and simultaneously start Lamotrigine     Taper Keppra:   Week 1 + 2: 125 mg in the morning and 250 mg at night  Week 3 + 4: 125 mg twice daily   Week 5 + 6: 125 mg daily     Start Lamotrigine according to instructions   Start with 25 mg prescription first, then go to 100 mg prescription    -No driving     -Yearly foot doctor follow up    -[address]     -Follow up in 6 months or sooner if needed. -If you develop sudden onset loss of vision, double vision, room spinning/world spinning sensation, inability to speak or understand others who are speaking to you, slurred speech, balance problems, weakness on one side of your body, numbness on one side of your body or worst headache you ever had, please seek out emergency medical attention via 911 for the nearest emergency room to be evaluated for possible stroke. -MyChart or call office with any questions or concerns. Thank you for coming to see me today. The easiest way to communicate with me is via [employer]. [employer] is meant for simple questions regarding medications, possible side effects, or other simple straight forward questions in limited sentences, rather than multiple paragraphs of discussion. [employer] is not meant for, or efficient for these complex questions, extensive questions, extensive medication adjustments, complex new symptoms or concerns. These issues beyond simple questions require a follow up visit with myself. Refills:  Please pay attention to when your refills will need to be renewed. Due to the volume of phone calls daily, this could potentially take a few days, although we certainly try to honor your refill requests as soon as we can. You should call at least 1 week in advance of needing a refill to ensure you do not run out of medication. Keep in mind that refill requests on Fridays may not be filled until the following week.             SEIZURES   Please do NOT drive until you are cleared by neurology    GENERAL INFORMATION:   A seizure, or convulsion, is uncontrolled jerking of the arms, legs, or face that lasts anywhere from a few seconds to minutes. Seizures can occur after a head injury, stroke, or brain tissue infection. In more than half of patients, the cause is not known. This is called epilepsy. INSTRUCTIONS:   1. Please continue to take Keppra and Lamotrigine to prevent seizures, take it exactly as directed. Do not stop taking the medicine without talking to your doctor first, even if you have not had a seizure in a long time. 2. Avoid activities in which a seizure would cause danger to yourself or to others. Don't operate dangerous machinery, swim alone, or climb in high or dangerous places, such as on ladders, roofs, or girders. Do not drive unless your doctor says you may. 3. Wear an emergency medical identification bracelet with information about your seizure. If you have a seizure, people around you will know what is wrong and get appropriate help. 4. If you have any warning that you may have a seizure, lay down in a safe place where you can't hurt yourself. 5. Do not drink alcohol or take illegal drugs. These can make you more susceptible to having seizures. 6. Please avoid potential seizure triggers as much as possible: stress, sleep deprivation, alcohol, successive stimulant use, illegal drugs, illness/infections     CONTACT YOUR DOCTOR IF:   You have any problems that may be related to the medicine you are taking. Teach your family, close friends, or co-workers what to do if you have a seizure:   1. Stay calm. Keep the person from falling onto harmful objects. Move hard or sharp objects out of the way. 2. Don't force anything into the person's mouth or try to open clenched jaws. Turn the person on his or her side when the violent movement stops or if he or she is vomiting. 3. When the seizure is over, the person may be confused or drowsy.  Reassure the person that he or she is all right. Help him or her to relax. 4. Call 911 and bring the patient back to the ED if:   a. The patient doesn't awaken shortly after the seizure   b. The patient has new problems such as difficulty seeing, speaking or moving   c. The patient was injured during the seizure   d. The patient has a temperature over 102 F (39C)   e. The patient vomited and now is having trouble breathing    Please continue to practice seizure precautions and first aid. Please do not climb to high places, such as rooftops, up trees or mountain climbing. When near water, you should be supervised by an adult or person who is aware of risk of seizures, for example during tub baths, swimming, boating or fishing. A helmet should be worn when riding a bike. First Aid for a generalized seizure:   -Remain calm and do not panic, call for assistance if needed.   -Lower the person safely to the ground and loosen any tight clothing.   -Place the person in a side-lying position so any saliva or vomit will easily drain out of the mouth. Actively seizing people are at a increased risk of choking on their saliva or vomit. Do not put any objects such as a tongue depressor or fingers into the mouth. Protect the persons head from injury while they are on their side.   -Time the seizure from start to finish so you know how long it lasted (most grand mal seizures are no more than 1 or 2 minutes long). If the seizure is continuing longer than 5 minutes, call the ambulance at 911 for transportation to the nearest Emergency Room. -After a grand mal seizure, people are very sleepy and tired for several minutes or even a couple of hours. They may also complain of headache, nausea and may vomit. Please remember:   Call your doctor if you feel that the severity or number of seizures has changed from baseline. If you have several grand mal seizures without waking up in-between, please notify your doctor. ----------------------------------------------   Seizure safety during sleep:   Always take anti-epileptic medications as prescribed by your doctor. Objects near the bed may cause injury if someone has a seizure is prone to falling out of bed. Move heavy furniture, floor lamps, night stands and other dangerous objects away from the bed. Mattresses and pillows should be firm and not soft. All stuffed animals, toys and other objects should be removed from the bed. Blankets can be layered but should be thin, down comforters may be too soft. Keep the bed as low to the ground as possible. Some patients with night time seizures may sleep with their mattress on the floor. Others may pad the floor with mats (such as exercise mats used in workout facilities) to pad the floor. During sleep, keep the bedroom door cracked open so someone can hear if you are having a seizure. Never lock the bedroom door. Some people choose to use baby monitors to observe for seizures at night.

## 2023-01-10 ENCOUNTER — PATIENT OUTREACH (OUTPATIENT)
Dept: CASE MANAGEMENT | Age: 78
End: 2023-01-10

## 2023-01-10 DIAGNOSIS — M94.9 DISORDER OF BONE AND CARTILAGE: ICD-10-CM

## 2023-01-10 DIAGNOSIS — F32.1 CURRENT MODERATE EPISODE OF MAJOR DEPRESSIVE DISORDER, UNSPECIFIED WHETHER RECURRENT (HCC): ICD-10-CM

## 2023-01-10 DIAGNOSIS — I10 PRIMARY HYPERTENSION: Primary | ICD-10-CM

## 2023-01-10 DIAGNOSIS — F41.1 GAD (GENERALIZED ANXIETY DISORDER): ICD-10-CM

## 2023-01-10 DIAGNOSIS — J45.20 MILD INTERMITTENT ASTHMA WITHOUT COMPLICATION: ICD-10-CM

## 2023-01-10 DIAGNOSIS — M89.9 DISORDER OF BONE AND CARTILAGE: ICD-10-CM

## 2023-01-13 ENCOUNTER — OFFICE VISIT (OUTPATIENT)
Dept: FAMILY MEDICINE CLINIC | Facility: CLINIC | Age: 78
End: 2023-01-13

## 2023-01-13 VITALS
SYSTOLIC BLOOD PRESSURE: 128 MMHG | BODY MASS INDEX: 23.98 KG/M2 | HEART RATE: 86 BPM | WEIGHT: 127 LBS | HEIGHT: 61 IN | OXYGEN SATURATION: 99 % | TEMPERATURE: 98 F | DIASTOLIC BLOOD PRESSURE: 82 MMHG

## 2023-01-13 DIAGNOSIS — M89.9 DISORDER OF BONE AND CARTILAGE: ICD-10-CM

## 2023-01-13 DIAGNOSIS — Z85.3 HISTORY OF BREAST CANCER: Primary | ICD-10-CM

## 2023-01-13 DIAGNOSIS — R26.9 GAIT ABNORMALITY: ICD-10-CM

## 2023-01-13 DIAGNOSIS — C40.22 MALIGNANT NEOPLASM OF LONG BONE OF LEFT LOWER EXTREMITY (HCC): ICD-10-CM

## 2023-01-13 DIAGNOSIS — M94.9 DISORDER OF BONE AND CARTILAGE: ICD-10-CM

## 2023-01-13 DIAGNOSIS — J45.20 MILD INTERMITTENT ASTHMA WITHOUT COMPLICATION: ICD-10-CM

## 2023-01-13 PROBLEM — I77.1 TORTUOUS AORTA: Status: ACTIVE | Noted: 2023-01-13

## 2023-01-13 PROBLEM — G40.309 NONINTRACTABLE GENERALIZED IDIOPATHIC EPILEPSY WITHOUT STATUS EPILEPTICUS (HCC): Status: ACTIVE | Noted: 2023-01-13

## 2023-01-13 PROBLEM — I77.1 TORTUOUS AORTA (HCC): Status: ACTIVE | Noted: 2023-01-13

## 2023-01-13 PROCEDURE — 1125F AMNT PAIN NOTED PAIN PRSNT: CPT | Performed by: FAMILY MEDICINE

## 2023-01-13 PROCEDURE — 99213 OFFICE O/P EST LOW 20 MIN: CPT | Performed by: FAMILY MEDICINE

## 2023-01-18 ENCOUNTER — PATIENT OUTREACH (OUTPATIENT)
Dept: CASE MANAGEMENT | Age: 78
End: 2023-01-18

## 2023-01-18 DIAGNOSIS — M94.9 DISORDER OF BONE AND CARTILAGE: ICD-10-CM

## 2023-01-18 DIAGNOSIS — F32.1 CURRENT MODERATE EPISODE OF MAJOR DEPRESSIVE DISORDER, UNSPECIFIED WHETHER RECURRENT (HCC): ICD-10-CM

## 2023-01-18 DIAGNOSIS — J45.20 MILD INTERMITTENT ASTHMA WITHOUT COMPLICATION: ICD-10-CM

## 2023-01-18 DIAGNOSIS — F41.1 GAD (GENERALIZED ANXIETY DISORDER): ICD-10-CM

## 2023-01-18 DIAGNOSIS — M89.9 DISORDER OF BONE AND CARTILAGE: ICD-10-CM

## 2023-01-18 DIAGNOSIS — I10 PRIMARY HYPERTENSION: Primary | ICD-10-CM

## 2023-01-18 DIAGNOSIS — H25.13 AGE-RELATED NUCLEAR CATARACT OF BOTH EYES: ICD-10-CM

## 2023-01-18 NOTE — PROGRESS NOTES
Patient received a call from Dr. Harleen Blankenship office stating Cardiac clearance and EKG results have not been received from Dr. Karis Soto office. Patient is requesting assistance with reaching out to Cardiology since wait time was over 20 minutes. Called Dr. Karis Soto office, spoke to [place] who was able to view Pre-op appointment as well as EKG report from 12/20/22. I provided Cushing with Dr. Harleen Blankenship fax number, she will be faxing information over. Dr. Jocelin Arroyo  Fax: [phone]    Contacted patient, message relayed.      Total time -  13 min  Total Monthly time-  45 min

## 2023-01-19 ENCOUNTER — TELEPHONE (OUTPATIENT)
Dept: FAMILY MEDICINE CLINIC | Facility: CLINIC | Age: 78
End: 2023-01-19

## 2023-01-19 NOTE — TELEPHONE ENCOUNTER
Patient came in office requesting EKG order for clearance for cataract surgery 01/25/2023. Contacted  office, surgery coordinator states no EKG is needed only requirement is clearance letter from cardiology not PCP. Patient states she has not been able to get hold of  several times. Tried contacting [address] [place] [address] cardiology no success, busy dial tone will try again later.

## 2023-02-09 ENCOUNTER — PATIENT OUTREACH (OUTPATIENT)
Dept: CASE MANAGEMENT | Age: 78
End: 2023-02-09

## 2023-02-09 DIAGNOSIS — G40.802 EPILEPSY WITH BOTH GENERALIZED AND FOCAL FEATURES (HCC): Primary | ICD-10-CM

## 2023-02-09 DIAGNOSIS — F41.1 GAD (GENERALIZED ANXIETY DISORDER): ICD-10-CM

## 2023-02-09 DIAGNOSIS — F32.1 CURRENT MODERATE EPISODE OF MAJOR DEPRESSIVE DISORDER, UNSPECIFIED WHETHER RECURRENT (HCC): ICD-10-CM

## 2023-02-09 DIAGNOSIS — M89.9 DISORDER OF BONE AND CARTILAGE: ICD-10-CM

## 2023-02-09 DIAGNOSIS — I10 PRIMARY HYPERTENSION: Primary | ICD-10-CM

## 2023-02-09 DIAGNOSIS — M94.9 DISORDER OF BONE AND CARTILAGE: ICD-10-CM

## 2023-02-09 DIAGNOSIS — J45.20 MILD INTERMITTENT ASTHMA WITHOUT COMPLICATION: ICD-10-CM

## 2023-02-09 RX ORDER — PREDNISOLONE ACETATE 10 MG/ML
SUSPENSION/ DROPS OPHTHALMIC
COMMUNITY
Start: 2023-01-17

## 2023-02-09 RX ORDER — OFLOXACIN 3 MG/ML
SOLUTION/ DROPS OPHTHALMIC
COMMUNITY
Start: 2023-01-17

## 2023-02-10 RX ORDER — LAMOTRIGINE 100 MG/1
TABLET ORAL
Qty: 60 TABLET | Refills: 5 | Status: SHIPPED | OUTPATIENT
Start: 2023-02-10

## 2023-02-10 NOTE — TELEPHONE ENCOUNTER
Medication: Lamotrigine 100mg, 1 po BID    LOV:1/3/23  NOV:none  PER, LOV AVS-Return in about 6 months (around 7/3/2023).       Last refill/[place]:1/3/23

## 2023-02-23 ENCOUNTER — HOSPITAL ENCOUNTER (OUTPATIENT)
Dept: CV DIAGNOSTICS | Facility: HOSPITAL | Age: 78
Discharge: HOME | End: 2023-02-23
Attending: INTERNAL MEDICINE
Payer: MEDICARE

## 2023-02-23 DIAGNOSIS — I34.1 MITRAL VALVE POSTERIOR LEAFLET PROLAPSE: ICD-10-CM

## 2023-02-23 DIAGNOSIS — I36.1 TRICUSPID VALVE INSUFFICIENCY, NON-RHEUMATIC: ICD-10-CM

## 2023-02-23 PROCEDURE — 93306 TTE W/DOPPLER COMPLETE: CPT | Performed by: INTERNAL MEDICINE

## 2023-03-10 ENCOUNTER — PATIENT OUTREACH (OUTPATIENT)
Dept: CASE MANAGEMENT | Age: 78
End: 2023-03-10

## 2023-03-10 DIAGNOSIS — J45.20 MILD INTERMITTENT ASTHMA WITHOUT COMPLICATION: ICD-10-CM

## 2023-03-10 DIAGNOSIS — M89.9 DISORDER OF BONE AND CARTILAGE: ICD-10-CM

## 2023-03-10 DIAGNOSIS — I10 PRIMARY HYPERTENSION: Primary | ICD-10-CM

## 2023-03-10 DIAGNOSIS — H25.13 AGE-RELATED NUCLEAR CATARACT OF BOTH EYES: ICD-10-CM

## 2023-03-10 DIAGNOSIS — M94.9 DISORDER OF BONE AND CARTILAGE: ICD-10-CM

## 2023-03-10 DIAGNOSIS — F41.1 GAD (GENERALIZED ANXIETY DISORDER): ICD-10-CM

## 2023-03-10 DIAGNOSIS — F32.1 CURRENT MODERATE EPISODE OF MAJOR DEPRESSIVE DISORDER, UNSPECIFIED WHETHER RECURRENT (HCC): ICD-10-CM

## 2023-03-12 DIAGNOSIS — G40.802 EPILEPSY WITH BOTH GENERALIZED AND FOCAL FEATURES (HCC): Primary | ICD-10-CM

## 2023-03-13 ENCOUNTER — PATIENT MESSAGE (OUTPATIENT)
Dept: NEUROLOGY | Facility: CLINIC | Age: 78
End: 2023-03-13

## 2023-03-13 RX ORDER — LAMOTRIGINE 25 MG/1
TABLET ORAL
Qty: 170 TABLET | Refills: 0 | Status: SHIPPED | OUTPATIENT
Start: 2023-03-13

## 2023-03-13 RX ORDER — LAMOTRIGINE 25 MG/1
TABLET ORAL
Qty: 170 TABLET | Refills: 0 | OUTPATIENT
Start: 2023-03-13

## 2023-03-13 NOTE — TELEPHONE ENCOUNTER
The patient is requesting a refill on: Lamotrigine 25mg - Per the patients message, she is currently doing 3 tablets twice daily.     Last OV: 1/3/23  Next OV: None  Last refilled: 1/3/23

## 2023-03-20 ENCOUNTER — LAB ENCOUNTER (OUTPATIENT)
Dept: LAB | Age: 78
End: 2023-03-20
Attending: FAMILY MEDICINE
Payer: MEDICARE

## 2023-03-20 DIAGNOSIS — I10 PRIMARY HYPERTENSION: ICD-10-CM

## 2023-03-20 LAB
ALBUMIN SERPL-MCNC: 3.9 G/DL (ref 3.4–5)
ALBUMIN/GLOB SERPL: 1.3 {RATIO} (ref 1–2)
ALP LIVER SERPL-CCNC: 60 U/L
ALT SERPL-CCNC: 20 U/L
ANION GAP SERPL CALC-SCNC: 6 MMOL/L (ref 0–18)
AST SERPL-CCNC: 19 U/L (ref 15–37)
BILIRUB SERPL-MCNC: 1 MG/DL (ref 0.1–2)
BUN BLD-MCNC: 9 MG/DL (ref 7–18)
BUN/CREAT SERPL: 11.5 (ref 10–20)
CALCIUM BLD-MCNC: 9.8 MG/DL (ref 8.5–10.1)
CHLORIDE SERPL-SCNC: 109 MMOL/L (ref 98–112)
CHOLEST SERPL-MCNC: 141 MG/DL (ref ?–200)
CO2 SERPL-SCNC: 30 MMOL/L (ref 21–32)
CREAT BLD-MCNC: 0.78 MG/DL
FASTING PATIENT LIPID ANSWER: YES
FASTING STATUS PATIENT QL REPORTED: YES
GFR SERPLBLD BASED ON 1.73 SQ M-ARVRAT: 79 ML/MIN/1.73M2 (ref 60–?)
GLOBULIN PLAS-MCNC: 2.9 G/DL (ref 2.8–4.4)
GLUCOSE BLD-MCNC: 114 MG/DL (ref 70–99)
HDLC SERPL-MCNC: 52 MG/DL (ref 40–59)
LDLC SERPL CALC-MCNC: 73 MG/DL (ref ?–100)
NONHDLC SERPL-MCNC: 89 MG/DL (ref ?–130)
OSMOLALITY SERPL CALC.SUM OF ELEC: 300 MOSM/KG (ref 275–295)
POTASSIUM SERPL-SCNC: 4.3 MMOL/L (ref 3.5–5.1)
PROT SERPL-MCNC: 6.8 G/DL (ref 6.4–8.2)
SODIUM SERPL-SCNC: 145 MMOL/L (ref 136–145)
TRIGL SERPL-MCNC: 80 MG/DL (ref 30–149)
TSI SER-ACNC: 3.79 MIU/ML (ref 0.36–3.74)
VLDLC SERPL CALC-MCNC: 12 MG/DL (ref 0–30)

## 2023-03-20 PROCEDURE — 36415 COLL VENOUS BLD VENIPUNCTURE: CPT

## 2023-03-20 PROCEDURE — 84443 ASSAY THYROID STIM HORMONE: CPT

## 2023-03-20 PROCEDURE — 80053 COMPREHEN METABOLIC PANEL: CPT

## 2023-03-20 PROCEDURE — 80061 LIPID PANEL: CPT

## 2023-03-21 DIAGNOSIS — R79.89 ELEVATED TSH: Primary | ICD-10-CM

## 2023-03-23 ENCOUNTER — LAB ENCOUNTER (OUTPATIENT)
Dept: LAB | Age: 78
End: 2023-03-23
Attending: PHYSICIAN ASSISTANT
Payer: MEDICARE

## 2023-03-23 DIAGNOSIS — R79.89 ELEVATED TSH: ICD-10-CM

## 2023-03-23 DIAGNOSIS — R73.09 ABNORMAL GLUCOSE: ICD-10-CM

## 2023-03-23 LAB
EST. AVERAGE GLUCOSE BLD GHB EST-MCNC: 108 MG/DL (ref 68–126)
HBA1C MFR BLD: 5.4 % (ref ?–5.7)
T4 FREE SERPL-MCNC: 0.9 NG/DL (ref 0.8–1.7)
TSI SER-ACNC: 2.1 MIU/ML (ref 0.36–3.74)

## 2023-03-23 PROCEDURE — 83036 HEMOGLOBIN GLYCOSYLATED A1C: CPT

## 2023-03-23 PROCEDURE — 84439 ASSAY OF FREE THYROXINE: CPT

## 2023-03-23 PROCEDURE — 36415 COLL VENOUS BLD VENIPUNCTURE: CPT

## 2023-03-23 PROCEDURE — 84443 ASSAY THYROID STIM HORMONE: CPT

## 2023-03-30 ENCOUNTER — MED REC SCAN ONLY (OUTPATIENT)
Dept: FAMILY MEDICINE CLINIC | Facility: CLINIC | Age: 78
End: 2023-03-30

## 2023-04-04 ENCOUNTER — PATIENT MESSAGE (OUTPATIENT)
Dept: NEUROLOGY | Facility: CLINIC | Age: 78
End: 2023-04-04
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
## 2023-04-04 DIAGNOSIS — G40.802 EPILEPSY WITH BOTH GENERALIZED AND FOCAL FEATURES (HCC): ICD-10-CM

## 2023-04-04 NOTE — TELEPHONE ENCOUNTER
Patient called to say her mom medication is not agreeing with her b/c she fell about 2 1/2 ago today and requesting a call back.

## 2023-04-05 RX ORDER — LAMOTRIGINE 25 MG/1
TABLET ORAL
Qty: 170 TABLET | Refills: 0 | Status: SHIPPED | OUTPATIENT
Start: 2023-04-05

## 2023-04-05 NOTE — TELEPHONE ENCOUNTER
Dr. Stewart Desi, please review and advise. Reviewed and electronically signed by:  [address] [facility], [address] [place] [address], [facility]

## 2023-04-05 NOTE — TELEPHONE ENCOUNTER
Pt daughter called & wanted to review message from MD today & current dosing. Pt has been taking lamotrigine 75mg po BID since mid March and is still falling. Daughter tried to call patient yesterday & was unable to reach her. She found pt laying on bathroom floor. She had been there for 2 hours. Daughter feels pt's coordination & balance have been off even on 75 po BID. She is wondering if Dr Lucy Ortega meant to continue that dose or lower it further. Daughter was able to schedule CT Brain for 4/15/23. She is wondering if Dr Lucy Ortega would like it sooner or if that date acceptable.     Pt has follow-up appointment scheduled with Dr Lucy Ortega for 4/19/23

## 2023-04-05 NOTE — TELEPHONE ENCOUNTER
Patient called office. Spoke to patient. Notified of message from Dr Atif Gregg as noted below. Pt verbalized understanding & appreciative of call. She requested CT scheduling info be sent in [employer] message.  Message sent

## 2023-04-05 NOTE — TELEPHONE ENCOUNTER
Please see the updated message. Reviewed and electronically signed by:  500 [facility], [address] [place] [address], [facility]

## 2023-04-12 ENCOUNTER — PATIENT OUTREACH (OUTPATIENT)
Dept: CASE MANAGEMENT | Age: 78
End: 2023-04-12

## 2023-04-15 ENCOUNTER — HOSPITAL ENCOUNTER (OUTPATIENT)
Dept: CT IMAGING | Facility: HOSPITAL | Age: 78
Discharge: HOME | End: 2023-04-15
Attending: Other
Payer: MEDICARE
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
## 2023-04-15 PROCEDURE — 70450 CT HEAD/BRAIN W/O DYE: CPT | Performed by: OTHER

## 2023-04-19 ENCOUNTER — OFFICE VISIT (OUTPATIENT)
Dept: NEUROLOGY | Facility: CLINIC | Age: 78
End: 2023-04-19
Payer: MEDICARE

## 2023-04-19 VITALS
HEART RATE: 80 BPM | BODY MASS INDEX: 24.17 KG/M2 | WEIGHT: 128 LBS | HEIGHT: 61 IN | DIASTOLIC BLOOD PRESSURE: 76 MMHG | SYSTOLIC BLOOD PRESSURE: 120 MMHG

## 2023-04-19 DIAGNOSIS — G62.9 LENGTH-DEPENDENT PERIPHERAL NEUROPATHY: ICD-10-CM

## 2023-04-19 DIAGNOSIS — G40.802 EPILEPSY WITH BOTH GENERALIZED AND FOCAL FEATURES (HCC): Primary | ICD-10-CM

## 2023-04-19 PROCEDURE — 99214 OFFICE O/P EST MOD 30 MIN: CPT | Performed by: OTHER

## 2023-04-19 RX ORDER — LACOSAMIDE 100 MG/1
100 TABLET ORAL 2 TIMES DAILY
Qty: 180 TABLET | Refills: 0 | Status: SHIPPED | OUTPATIENT
Start: 2023-04-19 | End: 2023-04-24

## 2023-04-19 NOTE — PATIENT INSTRUCTIONS
Taper Lamotrigine   4 days: 2 pills in the morning and 2 at night   4 days: 1 pill in the morning and 2 pills at night   4 days: 1 pill in the morning, then stop    At the SAME time, start Lacosmide  100 mg twice daily     -Follow up in 2 months or sooner if needed. -If you develop sudden onset loss of vision, double vision, room spinning/world spinning sensation, inability to speak or understand others who are speaking to you, slurred speech, balance problems, weakness on one side of your body, numbness on one side of your body or worst headache you ever had, please seek out emergency medical attention via 911 for the nearest emergency room to be evaluated for possible stroke. -MyChart or call office with any questions or concerns. Thank you for coming to see me today. The easiest way to communicate with me is via [employer]. [employer] is meant for simple questions regarding medications, possible side effects, or other simple straight forward questions in limited sentences, rather than multiple paragraphs of discussion. [employer] is not meant for, or efficient for these complex questions, extensive questions, extensive medication adjustments, complex new symptoms or concerns. These issues beyond simple questions require a follow up visit with myself. Refills:  Please pay attention to when your refills will need to be renewed. Due to the volume of phone calls daily, this could potentially take a few days, although we certainly try to honor your refill requests as soon as we can. You should call at least 1 week in advance of needing a refill to ensure you do not run out of medication. Keep in mind that refill requests on Fridays may not be filled until the following week.

## 2023-04-20 ENCOUNTER — PATIENT MESSAGE (OUTPATIENT)
Dept: NEUROLOGY | Facility: CLINIC | Age: 78
End: 2023-04-20

## 2023-04-20 DIAGNOSIS — G40.802 EPILEPSY WITH BOTH GENERALIZED AND FOCAL FEATURES (HCC): Primary | ICD-10-CM

## 2023-04-21 NOTE — TELEPHONE ENCOUNTER
S/w [email or web address]. Confirmed the cost of lacosamide 100 MG Oral Tab #180 - $1000. She entered a monthly quantity #60 and the cost was still about $500. Part of the issue  Is cost is a part of her deductible and preference is generic Informed lacosamide is the generic form of Vimpat. She provided me with Pt's Part D coverage. Part D ID # [id number]. S/w Linn Gilbert. States Pt. Has a deductible of $505 and a co insurance of 40%. lacosamide 100 MG is a Tier 4. Can request Prior authorization for change of Tier; it would lower the cost.       Lloyd Denise informed of above. She is requesting an alternative. IN LOV note Dr. America Dalton noted\"If lacosamide is not covered by insurance or fully tolerated, we can try Zonisamide instead. \"    Please review and advise.

## 2023-04-21 NOTE — TELEPHONE ENCOUNTER
From: Francine Billy  To: Amber Vera DO  Sent: 4/20/2023 5:08 PM CDT  Subject: Medicine too expensive    This message is being sent by Eliezer Zambrano on behalf of Francine Billy. Hi Dr. Cy Loya,    This is Fawnlesia Maldonadopshire, Prasanth Sit daughter. We saw you yesterday for a 9 AM appointment. At that time you prescribed a new medicine for my mom. We found out today that the copayment is over $1000 with insurance coverage. Could you please prescribe her a different medicine? Thank you so much for all of your help!     Fawn Maldonadopshire (Daughter)  Sienna Srivastava (Patient)

## 2023-04-24 RX ORDER — ZONISAMIDE 50 MG/1
CAPSULE ORAL
Qty: 388 CAPSULE | Refills: 0 | Status: SHIPPED | OUTPATIENT
Start: 2023-04-24 | End: 2023-08-06

## 2023-04-24 NOTE — TELEPHONE ENCOUNTER
Margarita Milizhao called to say that her Dad mistakenly picked up the original prescription from [facility] and paid the $500.00 not realizing that she was working on getting an alternative. They are now working with [facility] on trying to get a refund. She wants to ensure that we are still working on getting the alternative less expensive drug and is hoping this will not interfere with that. Please let her know once new prescription is good to go and if there is anything else she needs to do.

## 2023-04-26 ENCOUNTER — PATIENT OUTREACH (OUTPATIENT)
Dept: CASE MANAGEMENT | Age: 78
End: 2023-04-26

## 2023-04-26 DIAGNOSIS — F41.1 GAD (GENERALIZED ANXIETY DISORDER): ICD-10-CM

## 2023-04-26 DIAGNOSIS — M89.9 DISORDER OF BONE AND CARTILAGE: ICD-10-CM

## 2023-04-26 DIAGNOSIS — J45.20 MILD INTERMITTENT ASTHMA WITHOUT COMPLICATION: ICD-10-CM

## 2023-04-26 DIAGNOSIS — M94.9 DISORDER OF BONE AND CARTILAGE: ICD-10-CM

## 2023-04-26 DIAGNOSIS — I10 PRIMARY HYPERTENSION: Primary | ICD-10-CM

## 2023-04-26 DIAGNOSIS — G40.309 NONINTRACTABLE GENERALIZED IDIOPATHIC EPILEPSY WITHOUT STATUS EPILEPTICUS (HCC): ICD-10-CM

## 2023-04-26 DIAGNOSIS — F32.1 CURRENT MODERATE EPISODE OF MAJOR DEPRESSIVE DISORDER, UNSPECIFIED WHETHER RECURRENT (HCC): ICD-10-CM

## 2023-04-26 RX ORDER — LAMOTRIGINE 25 MG/1
50 TABLET ORAL 2 TIMES DAILY
COMMUNITY

## 2023-04-28 ENCOUNTER — HOSPITAL ENCOUNTER (OUTPATIENT)
Dept: CT IMAGING | Facility: HOSPITAL | Age: 78
Discharge: HOME | End: 2023-04-28
Attending: FAMILY MEDICINE
Payer: MEDICARE

## 2023-04-28 ENCOUNTER — TELEPHONE (OUTPATIENT)
Dept: FAMILY MEDICINE CLINIC | Facility: CLINIC | Age: 78
End: 2023-04-28

## 2023-04-28 ENCOUNTER — HOSPITAL ENCOUNTER (EMERGENCY)
Facility: HOSPITAL | Age: 78
Discharge: HOME | End: 2023-04-28
Attending: EMERGENCY MEDICINE
Payer: MEDICARE

## 2023-04-28 VITALS
DIASTOLIC BLOOD PRESSURE: 76 MMHG | SYSTOLIC BLOOD PRESSURE: 140 MMHG | BODY MASS INDEX: 24 KG/M2 | HEART RATE: 88 BPM | WEIGHT: 125 LBS | RESPIRATION RATE: 18 BRPM | OXYGEN SATURATION: 100 % | TEMPERATURE: 99 F

## 2023-04-28 DIAGNOSIS — I26.93 SINGLE SUBSEGMENTAL PULMONARY EMBOLISM WITHOUT ACUTE COR PULMONALE (HCC): Primary | ICD-10-CM

## 2023-04-28 DIAGNOSIS — R59.9 ENLARGED LYMPH NODES: ICD-10-CM

## 2023-04-28 LAB
CREAT BLD-MCNC: 0.7 MG/DL
GFR SERPLBLD BASED ON 1.73 SQ M-ARVRAT: 90 ML/MIN/1.73M2 (ref 60–?)

## 2023-04-28 PROCEDURE — 71260 CT THORAX DX C+: CPT | Performed by: FAMILY MEDICINE

## 2023-04-28 PROCEDURE — 99283 EMERGENCY DEPT VISIT LOW MDM: CPT | Performed by: EMERGENCY MEDICINE

## 2023-04-28 PROCEDURE — 82565 ASSAY OF CREATININE: CPT

## 2023-04-28 PROCEDURE — 99284 EMERGENCY DEPT VISIT MOD MDM: CPT | Performed by: EMERGENCY MEDICINE

## 2023-04-28 NOTE — ED INITIAL ASSESSMENT (HPI)
Patient was having a repeat CT today scan to assess a nodule. Incidental finding of PE. Denies any symptoms.

## 2023-04-28 NOTE — CM/SW NOTE
Spoke with patient and patient's son, at bedside, regarding her need for Xarelto. EDCM gave patient coupon for 30 Day Free Trial of Xarelto and explained how to use it. [facility] that if patient has a problem with coupon at pharmacy to call [facility] [place] [place]  Patient v/u.

## 2023-04-28 NOTE — TELEPHONE ENCOUNTER
I called the patient and she will proceed to the [facility] ED now. I contacted the [facility] ED and they stated they will look out for her.

## 2023-05-01 ENCOUNTER — OFFICE VISIT (OUTPATIENT)
Dept: FAMILY MEDICINE CLINIC | Facility: CLINIC | Age: 78
End: 2023-05-01

## 2023-05-01 VITALS
DIASTOLIC BLOOD PRESSURE: 76 MMHG | HEIGHT: 61 IN | HEART RATE: 90 BPM | WEIGHT: 123 LBS | BODY MASS INDEX: 23.22 KG/M2 | SYSTOLIC BLOOD PRESSURE: 111 MMHG

## 2023-05-01 DIAGNOSIS — I26.99 PULMONARY THROMBOEMBOLISM (HCC): Primary | ICD-10-CM

## 2023-05-01 PROCEDURE — 99213 OFFICE O/P EST LOW 20 MIN: CPT | Performed by: FAMILY MEDICINE

## 2023-05-01 PROCEDURE — 1126F AMNT PAIN NOTED NONE PRSNT: CPT | Performed by: FAMILY MEDICINE

## 2023-05-01 RX ORDER — RIVAROXABAN 15 MG/1
TABLET, FILM COATED ORAL
COMMUNITY
Start: 2023-04-28

## 2023-05-01 NOTE — PROGRESS NOTES
Blood pressure 111/76, pulse 90, height 5' 1\" (1.549 m), weight 123 lb (55.8 kg). Patient presents today following up for unprovoked pulmonary embolism. Patient denies any dyspnea. Currently taking Xarelto. Currently undergoing treatment with neurology for nonepileptic seizures. She now has hearing aids. Unsure if they were effective.     Objective patient comfortable no apparent distress    Assessment #1 pulmonary embolism #2 nonepileptic seizures #3 hearing loss    Plan #1 continue Xarelto follow-up in 2 weeks #2 follow-up with neurology #3 has appointment tomorrow for the hearing aids    We will do mental status exam when patient follows up with me in 2 weeks

## 2023-05-02 ENCOUNTER — PATIENT MESSAGE (OUTPATIENT)
Dept: NEUROLOGY | Facility: CLINIC | Age: 78
End: 2023-05-02

## 2023-05-03 NOTE — TELEPHONE ENCOUNTER
From: Elton Hernandez  To: Celina Rodrigez DO  Sent: 5/2/2023 8:55 PM CDT  Subject: Medication     This message is being sent by Janetta Gowers on behalf of Elton Hernandez. Hi Dr. Mervat Goodwin,    My mother, Christopher Stuart, is a patient of yours. We have been working with you about getting her on the proper medication that agrees with her. Because the last medication was $500, she was changed to Zonisamide 50 mg. Unfortunately she was diagnosed with having a small blood clot in her lung last Friday. She was placed on Xarelto 15 mg tablets twice a day. Dr. Natalia Avelar wanted me to check with you to see if there will be any interaction between the Zonisamide and the Xarelto before she starts taking the Zonisamide? If it is still OK for her to start this Zonisamide, could you please put in writing what her weaning schedule would be? The schedule that you gave us for the previous medication that was too costly, does not match the milligrams of the Zonisamide as it is only 50 mg.     Thank you,    Soledad Forte ( daughter- [phone])  Christopher Stuart (patient)

## 2023-05-04 NOTE — TELEPHONE ENCOUNTER
Called & spoke to patient daughter Mat Nava to notify of message  From Dr Andres Dodson as noted below. Discussed taper of both medications at the same time.  Daughter read back instructions, verbalized understanding & appreciative of call

## 2023-05-10 ENCOUNTER — OFFICE VISIT (OUTPATIENT)
Dept: HEMATOLOGY/ONCOLOGY | Facility: HOSPITAL | Age: 78
End: 2023-05-10
Attending: INTERNAL MEDICINE
Payer: MEDICARE

## 2023-05-10 VITALS
BODY MASS INDEX: 22.73 KG/M2 | OXYGEN SATURATION: 97 % | SYSTOLIC BLOOD PRESSURE: 122 MMHG | DIASTOLIC BLOOD PRESSURE: 70 MMHG | RESPIRATION RATE: 18 BRPM | HEART RATE: 90 BPM | WEIGHT: 120.38 LBS | HEIGHT: 61 IN | TEMPERATURE: 98 F

## 2023-05-10 DIAGNOSIS — I26.93 SINGLE SUBSEGMENTAL PULMONARY EMBOLISM WITHOUT ACUTE COR PULMONALE (HCC): Primary | ICD-10-CM

## 2023-05-10 DIAGNOSIS — I26.93 SINGLE SUBSEGMENTAL PULMONARY EMBOLISM WITHOUT ACUTE COR PULMONALE (HCC): ICD-10-CM

## 2023-05-10 LAB — D DIMER PPP FEU-MCNC: <0.27 UG/ML FEU (ref ?–0.75)

## 2023-05-10 PROCEDURE — 99204 OFFICE O/P NEW MOD 45 MIN: CPT | Performed by: INTERNAL MEDICINE

## 2023-05-10 PROCEDURE — 85379 FIBRIN DEGRADATION QUANT: CPT

## 2023-05-10 RX ORDER — ZONISAMIDE 50 MG/1
100 CAPSULE ORAL DAILY
COMMUNITY

## 2023-05-15 ENCOUNTER — TELEPHONE (OUTPATIENT)
Dept: HEMATOLOGY/ONCOLOGY | Facility: HOSPITAL | Age: 78
End: 2023-05-15

## 2023-05-15 RX ORDER — LISINOPRIL 10 MG/1
10 TABLET ORAL EVERY MORNING
Qty: 90 TABLET | Refills: 3 | Status: SHIPPED | OUTPATIENT
Start: 2023-05-15

## 2023-05-15 NOTE — TELEPHONE ENCOUNTER
[phone]  / fax [phone] Héctor Juarez was in office this am. She said that she was told no treatment  Or surgery of any kind. But they would like to know if she can have a regular dental cleaning done in 2 weeks. Please Advise.  Thanks [employer]

## 2023-05-15 NOTE — TELEPHONE ENCOUNTER
Returned call- unable to reach office- LM on office VM that patient can have a dental cleaning- that she is on blood thinners- just not anything invasive. Left phone number for call back if they have any questions.

## 2023-05-16 NOTE — TELEPHONE ENCOUNTER
Refill passed per [employer] protocol.     .  Requested Prescriptions   Pending Prescriptions Disp Refills    LISINOPRIL 10 MG Oral Tab [Pharmacy Med Name: LISINOPRIL 10MG TABLETS] 90 tablet 1     Sig: TAKE 1 TABLET(10 MG) BY MOUTH IN THE MORNING       Hypertensive Medications Protocol Passed - 5/15/2023  8:27 AM        Passed - In person appointment in the past 12 or next 3 months     Recent Outpatient Visits              5 days ago Single subsegmental pulmonary embolism without acute cor pulmonale [facility])    [facility] Hematology Oncology Katt Grey MD    Office Visit    2 weeks ago Pulmonary thromboembolism [facility])    [address] [facility], [address], [place], [place], DO    Office Visit    3 weeks ago 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 [facility])    [address], [address] [facility],[address], [place], [place]    Office Visit    1 month ago NICHO (generalized anxiety disorder)    [facility], [address], [place], [place], DO    Office Visit    4 months ago History of breast cancer    [address], [place], [place], DO    Office Visit          Future Appointments         Provider Department Appt Notes    In 1 month Drew Johnston, [address], [address] [facility],[address], [place] f/u ok per Dr. Wen Gallegos    In 3 months Anali 25 Hematology Oncology     In 3 months Joaquin Lawson 19 Hematology Oncology                Passed - Last BP reading less than 140/90     BP Readings from Last 1 Encounters:  05/10/23 : 122/70                Passed - CMP or BMP in past 6 months     Recent Results (from the past 4392 hour(s))   COMP METABOLIC PANEL (14)    Collection Time: 03/20/23 10:37 AM   Result Value Ref Range    Glucose 114 (H) 70 - 99 mg/dL    Sodium 145 136 - 145 mmol/L    Potassium 4.3 3.5 - 5.1 mmol/L    Chloride 109 98 - 112 mmol/L    CO2 30.0 21.0 - 32.0 mmol/L    Anion Gap 6 0 - 18 mmol/L    BUN 9 7 - 18 mg/dL    Creatinine 0.78 0.55 - 1.02 mg/dL    BUN/CREA Ratio 11.5 10.0 - 20.0    Calcium, Total 9.8 8.5 - 10.1 mg/dL    Calculated Osmolality 300 (H) 275 - 295 mOsm/kg    eGFR-Cr 79 >=60 mL/min/1.73m2    ALT 20 13 - 56 U/L    AST 19 15 - 37 U/L    Alkaline Phosphatase 60 55 - 142 U/L    Bilirubin, Total 1.0 0.1 - 2.0 mg/dL    Total Protein 6.8 6.4 - 8.2 g/dL    Albumin 3.9 3.4 - 5.0 g/dL    Globulin  2.9 2.8 - 4.4 g/dL    A/G Ratio 1.3 1.0 - 2.0    Patient Fasting for CMP? Yes      *Note: Due to a large number of results and/or encounters for the requested time period, some results have not been displayed. A complete set of results can be found in Results Review.                  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 Lito Dunham    In 3 months [facility] 25 Hematology Oncology     In 3 months Kimmy Owens, [address] Hematology Oncology                Passed [facility] > 50     GFR Evaluation  EGFRCR: 90 , resulted on 4/28/2023                 Recent Outpatient Visits              5 days ago Single subsegmental pulmonary embolism without acute cor pulmonale [facility])    [facility] AND [facility] Hematology Oncology Graeme Jackson MD    Office Visit    2 weeks ago Pulmonary thromboembolism [facility])    Javan Natarajan, [facility], [place], [place], DO    Office Visit    3 weeks ago 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, [place], [place], DO    Office Visit            Future Appointments         Provider Department Appt Notes    In 1 month Drew Balderas, Javan Natarajan, [address], [place] f/u ok per Dr. Atif Gregg    In 3 months Anali 25 Hematology Oncology     In 3 months Joaquin Cabezas 19 Hematology Oncology

## 2023-05-18 ENCOUNTER — MED REC SCAN ONLY (OUTPATIENT)
Dept: FAMILY MEDICINE CLINIC | Facility: CLINIC | Age: 78
End: 2023-05-18

## 2023-05-19 ENCOUNTER — PATIENT OUTREACH (OUTPATIENT)
Dept: CASE MANAGEMENT | Age: 78
End: 2023-05-19

## 2023-05-19 DIAGNOSIS — M94.9 DISORDER OF BONE AND CARTILAGE: ICD-10-CM

## 2023-05-19 DIAGNOSIS — I10 PRIMARY HYPERTENSION: Primary | ICD-10-CM

## 2023-05-19 DIAGNOSIS — M89.9 DISORDER OF BONE AND CARTILAGE: ICD-10-CM

## 2023-05-19 DIAGNOSIS — G40.309 NONINTRACTABLE GENERALIZED IDIOPATHIC EPILEPSY WITHOUT STATUS EPILEPTICUS (HCC): ICD-10-CM

## 2023-05-19 DIAGNOSIS — F32.1 CURRENT MODERATE EPISODE OF MAJOR DEPRESSIVE DISORDER, UNSPECIFIED WHETHER RECURRENT (HCC): ICD-10-CM

## 2023-05-19 DIAGNOSIS — I26.93 SINGLE SUBSEGMENTAL PULMONARY EMBOLISM WITHOUT ACUTE COR PULMONALE (HCC): ICD-10-CM

## 2023-05-19 DIAGNOSIS — F41.1 GAD (GENERALIZED ANXIETY DISORDER): ICD-10-CM

## 2023-06-05 ENCOUNTER — OFFICE VISIT (OUTPATIENT)
Dept: FAMILY MEDICINE CLINIC | Facility: CLINIC | Age: 78
End: 2023-06-05

## 2023-06-05 VITALS
SYSTOLIC BLOOD PRESSURE: 186 MMHG | OXYGEN SATURATION: 99 % | DIASTOLIC BLOOD PRESSURE: 60 MMHG | HEART RATE: 93 BPM | HEIGHT: 61 IN | BODY MASS INDEX: 22.09 KG/M2 | WEIGHT: 117 LBS

## 2023-06-05 DIAGNOSIS — R41.3 MEMORY LOSS: Primary | ICD-10-CM

## 2023-06-05 PROCEDURE — 99213 OFFICE O/P EST LOW 20 MIN: CPT | Performed by: FAMILY MEDICINE

## 2023-06-05 PROCEDURE — 1126F AMNT PAIN NOTED NONE PRSNT: CPT | Performed by: FAMILY MEDICINE

## 2023-06-05 NOTE — PROGRESS NOTES
Blood pressure (!) 86/60, pulse 93, height 5' 1\" (1.549 m), weight 117 lb (53.1 kg), SpO2 99 %. Some concern about memory loss. Daughter reports that mental status has improved significantly off the lamotrigine. Not taking zonisamide. Has appointment with neurology in 3 weeks.     Objective patient able to name 8 animals in 1 minute    Mini-Mental status exam score 27/30 missed 2 objects and the exact date    Unable to draw clock face showing the correct time    Assessment mental status changes on medication    Plan follow-up with neurology as scheduled Will advise patient to follow-up for repeat blood pressure check

## 2023-06-13 ENCOUNTER — PATIENT OUTREACH (OUTPATIENT)
Dept: CASE MANAGEMENT | Age: 78
End: 2023-06-13

## 2023-06-13 DIAGNOSIS — I10 PRIMARY HYPERTENSION: Primary | ICD-10-CM

## 2023-06-13 DIAGNOSIS — F32.1 CURRENT MODERATE EPISODE OF MAJOR DEPRESSIVE DISORDER, UNSPECIFIED WHETHER RECURRENT (HCC): ICD-10-CM

## 2023-06-13 DIAGNOSIS — F41.1 GAD (GENERALIZED ANXIETY DISORDER): ICD-10-CM

## 2023-06-13 DIAGNOSIS — M94.9 DISORDER OF BONE AND CARTILAGE: ICD-10-CM

## 2023-06-13 DIAGNOSIS — G40.309 NONINTRACTABLE GENERALIZED IDIOPATHIC EPILEPSY WITHOUT STATUS EPILEPTICUS (HCC): ICD-10-CM

## 2023-06-13 DIAGNOSIS — M89.9 DISORDER OF BONE AND CARTILAGE: ICD-10-CM

## 2023-06-28 ENCOUNTER — TELEPHONE (OUTPATIENT)
Dept: NEUROLOGY | Facility: CLINIC | Age: 78
End: 2023-06-28

## 2023-06-28 ENCOUNTER — OFFICE VISIT (OUTPATIENT)
Dept: NEUROLOGY | Facility: CLINIC | Age: 78
End: 2023-06-28
Payer: MEDICARE

## 2023-06-28 VITALS
HEIGHT: 61 IN | HEART RATE: 85 BPM | WEIGHT: 117 LBS | SYSTOLIC BLOOD PRESSURE: 107 MMHG | DIASTOLIC BLOOD PRESSURE: 73 MMHG | BODY MASS INDEX: 22.09 KG/M2

## 2023-06-28 DIAGNOSIS — G62.9 LENGTH-DEPENDENT PERIPHERAL NEUROPATHY: ICD-10-CM

## 2023-06-28 DIAGNOSIS — G40.802 EPILEPSY WITH BOTH GENERALIZED AND FOCAL FEATURES (HCC): Primary | ICD-10-CM

## 2023-06-28 PROCEDURE — 99214 OFFICE O/P EST MOD 30 MIN: CPT | Performed by: OTHER

## 2023-06-28 RX ORDER — ZONISAMIDE 100 MG/1
100 CAPSULE ORAL 2 TIMES DAILY
Qty: 90 CAPSULE | Refills: 3 | Status: SHIPPED | OUTPATIENT
Start: 2023-06-28 | End: 2024-06-22

## 2023-06-28 RX ORDER — ZONISAMIDE 100 MG/1
100 CAPSULE ORAL DAILY
Qty: 90 CAPSULE | Refills: 3 | Status: SHIPPED | OUTPATIENT
Start: 2023-06-28 | End: 2023-06-28

## 2023-07-05 ENCOUNTER — TELEPHONE (OUTPATIENT)
Dept: HEMATOLOGY/ONCOLOGY | Facility: HOSPITAL | Age: 78
End: 2023-07-05

## 2023-07-05 NOTE — TELEPHONE ENCOUNTER
Called pt back. No answer unable to leave message. Reviewed with Dr Sowmya Esparza c/o gum bleeding. Soft tooth brush away from gums  Rinse with cold water.  Monitor bleeding

## 2023-07-05 NOTE — TELEPHONE ENCOUNTER
Radha Shady [phone] was put on a blood thinner and she is having bleeding in the gums it start out a little bit now on a scale of 1 to 10 bleeding it's about a 6. This happens at night. Now when she is eating it happens during the day. Denies nausea vomiting and pain. This just happens.  Thanks [employer]

## 2023-07-05 NOTE — TELEPHONE ENCOUNTER
Patient called back, she has been on Rivaroxaban and taking as prescribed. She has been having increasing bleeding of gum with care and now eating. Was just the lower gums but now all over. Denies fevers, no sob or chest pain, denies n/v/d. Is eating and drinking well. Denies lightheadedness or dizziness. No urinary or bowel complaints. No bleeding noted elsewhere. Please advise.

## 2023-07-07 ENCOUNTER — PATIENT OUTREACH (OUTPATIENT)
Dept: CASE MANAGEMENT | Age: 78
End: 2023-07-07

## 2023-07-07 DIAGNOSIS — I10 PRIMARY HYPERTENSION: Primary | ICD-10-CM

## 2023-07-07 DIAGNOSIS — M89.9 DISORDER OF BONE AND CARTILAGE: ICD-10-CM

## 2023-07-07 DIAGNOSIS — J45.20 MILD INTERMITTENT ASTHMA WITHOUT COMPLICATION: ICD-10-CM

## 2023-07-07 DIAGNOSIS — G40.309 NONINTRACTABLE GENERALIZED IDIOPATHIC EPILEPSY WITHOUT STATUS EPILEPTICUS (HCC): ICD-10-CM

## 2023-07-07 DIAGNOSIS — M94.9 DISORDER OF BONE AND CARTILAGE: ICD-10-CM

## 2023-07-07 DIAGNOSIS — F32.1 CURRENT MODERATE EPISODE OF MAJOR DEPRESSIVE DISORDER, UNSPECIFIED WHETHER RECURRENT (HCC): ICD-10-CM

## 2023-07-07 DIAGNOSIS — F41.1 GAD (GENERALIZED ANXIETY DISORDER): ICD-10-CM

## 2023-07-10 ENCOUNTER — MED REC SCAN ONLY (OUTPATIENT)
Dept: FAMILY MEDICINE CLINIC | Facility: CLINIC | Age: 78
End: 2023-07-10

## 2023-07-11 ENCOUNTER — TELEPHONE (OUTPATIENT)
Dept: HEMATOLOGY/ONCOLOGY | Facility: HOSPITAL | Age: 78
End: 2023-07-11

## 2023-07-11 DIAGNOSIS — I26.93 SINGLE SUBSEGMENTAL PULMONARY EMBOLISM WITHOUT ACUTE COR PULMONALE (HCC): Primary | ICD-10-CM

## 2023-07-11 NOTE — TELEPHONE ENCOUNTER
Patient had called on 7/5  regarding her gums bleeding. She had missed the return call from [facility]. Patient today-  states that he gums are still bleeding,. Patient believes it is from the blood thinner she takes. .  states she looks like a monster

## 2023-07-11 NOTE — TELEPHONE ENCOUNTER
Paulette Recio, she did not receive call back about bleeding gum and is still having bleeding with gums in the lower mouth,  no bleeding from nose or any other bleeding noted. Reviewed note from [facility] for patient to use cool water rinsing and soft tooth brush. She has been using a Listerine product for gums that did cause burning so instructed her to use some Baking soda in water for mouth rinses a couple of times per day. Use very soft tooth brush, eat soft foods. and monitor. To call back if continues. She verbalizes understanding and in agreement with plan.

## 2023-07-11 NOTE — TELEPHONE ENCOUNTER
Called pt back. Discussed with Dr Nikos Tan. Hold rivaroxaban. Will place orders for cbc and D dimer to be drawn this week. She agrees and will go to lab tomorrow or Thursday.

## 2023-07-12 ENCOUNTER — LAB ENCOUNTER (OUTPATIENT)
Dept: LAB | Age: 78
End: 2023-07-12
Attending: INTERNAL MEDICINE
Payer: MEDICARE

## 2023-07-12 DIAGNOSIS — I26.93 SINGLE SUBSEGMENTAL PULMONARY EMBOLISM WITHOUT ACUTE COR PULMONALE (HCC): ICD-10-CM

## 2023-07-12 LAB
BASOPHILS # BLD AUTO: 0.05 X10(3) UL (ref 0–0.2)
BASOPHILS NFR BLD AUTO: 0.9 %
D DIMER PPP FEU-MCNC: <0.27 UG/ML FEU (ref ?–0.75)
DEPRECATED RDW RBC AUTO: 43.3 FL (ref 35.1–46.3)
EOSINOPHIL # BLD AUTO: 0.44 X10(3) UL (ref 0–0.7)
EOSINOPHIL NFR BLD AUTO: 7.7 %
ERYTHROCYTE [DISTWIDTH] IN BLOOD BY AUTOMATED COUNT: 12.9 % (ref 11–15)
HCT VFR BLD AUTO: 41.2 %
HGB BLD-MCNC: 13.9 G/DL
IMM GRANULOCYTES # BLD AUTO: 0.01 X10(3) UL (ref 0–1)
IMM GRANULOCYTES NFR BLD: 0.2 %
LYMPHOCYTES # BLD AUTO: 0.76 X10(3) UL (ref 1–4)
LYMPHOCYTES NFR BLD AUTO: 13.3 %
MCH RBC QN AUTO: 31 PG (ref 26–34)
MCHC RBC AUTO-ENTMCNC: 33.7 G/DL (ref 31–37)
MCV RBC AUTO: 92 FL
MONOCYTES # BLD AUTO: 0.51 X10(3) UL (ref 0.1–1)
MONOCYTES NFR BLD AUTO: 8.9 %
NEUTROPHILS # BLD AUTO: 3.96 X10 (3) UL (ref 1.5–7.7)
NEUTROPHILS # BLD AUTO: 3.96 X10(3) UL (ref 1.5–7.7)
NEUTROPHILS NFR BLD AUTO: 69 %
PLATELET # BLD AUTO: 214 10(3)UL (ref 150–450)
RBC # BLD AUTO: 4.48 X10(6)UL
WBC # BLD AUTO: 5.7 X10(3) UL (ref 4–11)

## 2023-07-12 PROCEDURE — 85025 COMPLETE CBC W/AUTO DIFF WBC: CPT

## 2023-07-12 PROCEDURE — 85379 FIBRIN DEGRADATION QUANT: CPT

## 2023-07-12 PROCEDURE — 36415 COLL VENOUS BLD VENIPUNCTURE: CPT

## 2023-07-31 DIAGNOSIS — R56.9 SEIZURE (HCC): ICD-10-CM

## 2023-07-31 NOTE — TELEPHONE ENCOUNTER
Requested Prescriptions     Pending Prescriptions Disp Refills    levETIRAcetam 250 MG Oral Tab 180 tablet 3     Sig: Take 1 tablet (250 mg total) by mouth 2 (two) times daily. LOV: 6/28/23  NOV:  9/27/23    [place] 1[place]3/22    REQUEST DENIED-MED DISCONTINUED (SEE BELOW)    PER 1/3/23 office visit note:  Focal and generalized epilepsy She has 1 seizure risk factor in that she has had head trauma in the past.    Anxiety and depression  -Taper Keppra, start Lamotrigine - discussed risk of SJS and symptoms to monitor/call 911 for   --Seizure precautions list provided, no driving   --Encouraged to establish with psychiatry and/or psychology - long discussion - she is OK with psychology      PER 4/19/23 office visit note:  Focal and generalized epilepsy has not been able to tolerate Keppra or Lamotrigine.  -Taper off Lamotrigine. Start Lacosamide. We discussed potential side effects in detail. If lacosamide is not covered by insurance or fully tolerated, we can try Zonisamide instead.   --Seizure precautions list provided, no driving   --Encouraged to establish with psychiatry and/or psychology - long discussion - she is OK with psychology    Per TE 4/20/23-Lacosamide to expensive, Pt changed to Zonisamide    PER 6/28/23 office visit:  Focal and generalized epilepsy has not been able to tolerate Keppra or Lamotrigine.  -Continue Zonisamide 100 mg twice daily   --Seizure precautions list provided, no driving   --Encouraged to establish with psychiatry and/or psychology - long discussion - she is OK with psychology

## 2023-08-07 ENCOUNTER — PATIENT OUTREACH (OUTPATIENT)
Dept: CASE MANAGEMENT | Age: 78
End: 2023-08-07

## 2023-08-21 ENCOUNTER — OFFICE VISIT (OUTPATIENT)
Facility: CLINIC | Age: 78
End: 2023-08-21

## 2023-08-21 VITALS
BODY MASS INDEX: 21.34 KG/M2 | HEIGHT: 61 IN | WEIGHT: 113 LBS | DIASTOLIC BLOOD PRESSURE: 71 MMHG | HEART RATE: 85 BPM | SYSTOLIC BLOOD PRESSURE: 109 MMHG

## 2023-08-21 DIAGNOSIS — K62.5 RECTAL BLEEDING: Primary | ICD-10-CM

## 2023-08-21 DIAGNOSIS — K59.00 CONSTIPATION, UNSPECIFIED CONSTIPATION TYPE: ICD-10-CM

## 2023-08-21 NOTE — H&P
[facility], [facility] - Gastroenterology                                                                                                               Reason for consult: rectal bleeding    Requesting physician or provider: Alejandro Olson. DO Josefina    Patient presents with:  Consult: Constipation; was straining with BM and saw blood; lasted for about 2-3 days    HPI:   Casper Greer is a 78year old year-old woman with history of breast cancer s/p lumpectomy and chemo/radiation presenting for evaluation of rectal bleeding. Pt notes 2-3 episodes of rectal bleeding after straining with hard stool. This occurred about 3 weeks prior to visit. She notes that bleeding was a drop of blood with brown stool. This self-resolved after occurring for about 2-3 days with 1 BM each day. She does note increased constipation for which she has been taking fiber gummies. She does not feel these help. She feels like miralax causes diarrhea. She previously was having BMs every day, now closer to every other day. She denies abdominal pain. She denies rectal pain. She is on [facility] with xarelto for incidentally noted PE on recent chest imaging in 05/2023. She reports weight loss associated with stress from 's new diagnosis of depression and dementia vs pseudodementia. Her  no longer can drive and she has to rely on her children for rides which has been stressful. She had a colonoscopy in 2013 for family history of colon polyps, CRC screening that was notable for small cecal TA, tiny aphthous erosion/ulcerations of the R colon with non-specific pathology and pancolonic diverticulosis. She had negative cologuard in 2022. She is hesitant to pursue colonoscopy or flexible sigmoidoscopy as she is concerned about needing to take bowel prep or enemas in the setting of decreased ambulation (uses rolling walker).      Prior endoscopies:  Colonoscopy 2013: POSTOPERATIVE DIAGNOSES:  1.  Small cecal polyp. 2.  Tiny abscess erosion/ulcerations of the right colon. 3.  Pancolonic diverticulosis. Wt Readings from Last 6 Encounters:  08/21/23 : 113 lb (51.3 kg)  06/28/23 : 117 lb (53.1 kg)  06/05/23 : 117 lb (53.1 kg)  05/10/23 : 120 lb 6.4 oz (54.6 kg)  05/01/23 : 123 lb (55.8 kg)  04/28/23 : 125 lb (56.7 kg)     History, Medications, Allergies, ROS:      Past Medical History:   Diagnosis Date    Breast cancer, left ([address]     PER nh: LUMPECTOMY 04/2006'; LYMPH NODE LEFT; LT LYMPH NODE DISSECTION, 6 out of 10    Cancer [facility] [place] 2006, bone femur 2016    Encounter for chemotherapy management 06/01/2006    per NG: chemo for breast cancer    Extrinsic asthma, unspecified [birth year]/1950    Hemorrhoids     History of Papanicolaou smear of cervix 12/12/2012    DONE CO NG    Radiation 10/01/2006    per NG: Lt breast radiation /Dr Benedict Johnson      Past Surgical History:   Procedure Laterality Date    BREAST LUMPECTOMY  04/2006    PER NG: BREAST CANCER LEFT SIDE SEES ONCOLOGY AT [address] [place] [address] 83  04/2006    per NG: Lt lymph node disection.  6 out of 10 affected YEARLY MAMMOGRAMS AT [facility]  2011    per NG:right breast calcification removal    COLONOSCOPY  2010    HIP REPLACEMENT SURGERY      AND REVISION 2016    SURGERY - EXTERNAL  12/2017    LEFT FEMUR CANCER HARDWARE/MARLI PLACED       Family Hx:   Family History   Problem Relation Age of Onset    Diabetes Mother     Endocrine Disorder Mother 58        per NG: pancreatitis    Hypertension Mother     Polyps Daughter         per NG: colon polyps    Breast Cancer Cousin     Diabetes Maternal Uncle         per NG: Type 2    Diabetes Maternal Aunt         per NG: Type 2    Glaucoma Neg       Social History:   Social History     Socioeconomic History    Marital status:    Tobacco Use    Smoking status: Former     Types: Cigarettes    Smokeless tobacco: Never Tobacco comments:     22 years ago    Vaping Use    Vaping Use: Never used   Substance and Sexual Activity    Alcohol use: No    Drug use: Never   Other Topics Concern    Caffeine Concern Yes     Comment: per NG: chocolate; tea, 2 cups daily   Social Determinants of Health  Financial Resource Strain: Low Risk  (3/10/2023)      Financial Resource Strain          Difficulty of Paying Living Expenses: Not hard at all          Med Affordability: No  Food Insecurity: No Food Insecurity (3/10/2023)      Food Insecurity          Food Insecurity: Never true  Transportation Needs: No Transportation Needs (3/10/2023)      Transportation Needs          Lack of Transportation: No  Physical Activity: Inactive (3/10/2023)      Exercise Vital Sign          Days of Exercise per Week: 0 days          Minutes of Exercise per Session: 0 min  Stress: No Stress Concern Present (3/10/2023)      Stress          Feeling of Stress : No  Social Connections: Socially Integrated (3/10/2023)      Social Connections          Frequency of Socialization with Friends and Family: 2  Housing Stability: Low Risk  (3/10/2023)      Housing Stability          Housing Instability: No     Medications (Active prior to today's visit):  Current Outpatient Medications   Medication Sig Dispense Refill    escitalopram 20 MG Oral Tab Take 1 tablet (20 mg total) by mouth daily. 90 tablet 3    zonisamide 100 MG Oral Cap Take 1 capsule (100 mg total) by mouth in the morning and 1 capsule (100 mg total) before bedtime. 90 capsule 3    pravastatin 40 MG Oral Tab Take 1 tablet (40 mg total) by mouth daily. 90 tablet 3    montelukast 10 MG Oral Tab Take 1 tablet (10 mg total) by mouth daily. 90 tablet 3    lisinopril 10 MG Oral Tab Take 1 tablet (10 mg total) by mouth every morning. 90 tablet 3    rivaroxaban 20 MG Oral Tab Take 1 tablet (20 mg total) by mouth daily with food.  30 tablet 2    prednisoLONE 1 % Ophthalmic Suspension       Calcium ([facility] CALCIUM) 600 MG Oral Tab Take 600 mg by mouth 2 (two) times daily. 180 tablet 3    Trospium Chloride 20 MG Oral Tab Take 1 tablet (20 mg total) by mouth nightly. Glucosamine-Chondroit-Vit C-Mn (GLUCOSAMINE 1500 COMPLEX OR) Take 2 capsules by mouth daily. Multiple Vitamins-Minerals (MULTI FOR HER 50+) Oral Tab Take  by mouth. fexofenadine 180 MG Oral Tab Take 1 tablet (180 mg total) by mouth daily as needed. Allergies:    Clindamycin                 Comment:Other reaction(s): CLINDAMYCIN  Penicillins             UNKNOWN    ROS:   CONSTITUTIONAL:  negative for fevers, rigors  EYES:  negative for diplopia   RESPIRATORY:  negative for severe shortness of breath  CARDIOVASCULAR:  negative for crushing sub-sternal chest pain  GASTROINTESTINAL:  see HPI  GENITOURINARY:  negative for dysuria or gross hematuria  INTEGUMENT/BREAST:  SKIN:  negative for jaundice   ALLERGIC/IMMUNOLOGIC:  negative for hay fever  ENDOCRINE:  negative for cold intolerance and heat intolerance  MUSCULOSKELETAL:  negative for joint effusion/severe erythema  BEHAVIOR/PSYCH:  negative for psychotic behavior      PHYSICAL EXAM:   Blood pressure 109/71, pulse 85, height 5' 1\" (1.549 m), weight 113 lb (51.3 kg).     Gen: appears comfortable and in no acute distress  HEENT: sclera appear anicteric, oropharynx clear, mucus membranes appear moist  CV: regular rate, the extremities are warm and well-perfused   Lung: no increased work of breathing, no conversational dyspnea   Abd: soft, non-tender exam in all quadrants without rigidity or guarding, non-distended, no masses palpated  : non-bleeding external hemorrhoids and skin tags, no anal fissure, no rectal pain with palpation, JAM without bleeding and no apparent masses palpated  Skin: no jaundice, no apparent rashes   Neuro: alert and interactive, no focal neuro deficits  Psych: cooperative, normal affect     Labs/Imaging:     Patient's pertinent labs and imaging were reviewed and discussed with patient today. .  ASSESSMENT/PLAN:   Angela Barker is a 78year old year-old woman with history of breast cancer s/p lumpectomy and chemo/radiation presenting for evaluation of rectal bleeding. # Rectal Bleeding  Most likely hemorrhoidal in etiology given history of straining with hard stool. Has since resolved without interventions. Discussed pursuing endoscopic evaluation as last colonoscopy was 10 years ago; however, patient would like to avoid this as she will have difficulty prepping with bowel prep (colonoscopy) or enemas (flex sig) in setting of decreased ambulation. We discussed that alternatively we could check a CBC and if no significant change in hemoglobin and/or anemia, could start fiber supplementation for management of hemorrhoids/constipation. She would like to start with CBC first. She does endorse weight loss but she says this is normal for her when she is in stressful situation. Recommendations:  - Check CBC to assess for change in hemoglobin and/or anemia.   - Start fiber supplementation with citrucel or metamucil 1x daily.   - Stay well-hydrated with 1-2L of water-based fluids a day. - If anemic and/or significant change in Hb on labs, will rediscuss endoscopic evaluation.   - If bleeding recurs, can trial anusol-hydrocortisone suppositories x 2 weeks.      Orders This Visit:  Orders Placed This Encounter      CBC With Differential With Platelet      Meds This Visit:  Requested Prescriptions      No prescriptions requested or ordered in this encounter       Imaging & Referrals:  None       Nara Nuñez MD  [facility], [facility] Gastroenterology  8/21/2023

## 2023-08-21 NOTE — PATIENT INSTRUCTIONS
1. Use wet towelettes (Kleenix, Tucks, Walker) to clean the anal area after bowel movements and then pat dry the area with dry toilet paper. 2. DO NOT rub the area with dry toilet paper. Sitz baths can be taken as necessary (30 minutes soaking in a tub of tepid water once or twice a day for perianal burning and/or itching)    3. Start daily Metamucil and use one to two scoops per day. Make sure to drink at least 1-2 liters of water-based fluids a day. Please have your blood counts checked to assess for anemia or drop in hemoglobin level.

## 2023-08-22 ENCOUNTER — OFFICE VISIT (OUTPATIENT)
Dept: HEMATOLOGY/ONCOLOGY | Facility: HOSPITAL | Age: 78
End: 2023-08-22
Attending: INTERNAL MEDICINE
Payer: MEDICARE

## 2023-08-22 VITALS
TEMPERATURE: 98 F | BODY MASS INDEX: 21.41 KG/M2 | DIASTOLIC BLOOD PRESSURE: 73 MMHG | RESPIRATION RATE: 18 BRPM | SYSTOLIC BLOOD PRESSURE: 117 MMHG | WEIGHT: 113.38 LBS | HEIGHT: 61 IN | HEART RATE: 80 BPM | OXYGEN SATURATION: 99 %

## 2023-08-22 DIAGNOSIS — I26.93 SINGLE SUBSEGMENTAL PULMONARY EMBOLISM WITHOUT ACUTE COR PULMONALE (HCC): ICD-10-CM

## 2023-08-22 DIAGNOSIS — Z51.81 MONITORING FOR ANTICOAGULANT USE: ICD-10-CM

## 2023-08-22 DIAGNOSIS — I26.93 SINGLE SUBSEGMENTAL PULMONARY EMBOLISM WITHOUT ACUTE COR PULMONALE (HCC): Primary | ICD-10-CM

## 2023-08-22 DIAGNOSIS — Z79.01 MONITORING FOR ANTICOAGULANT USE: ICD-10-CM

## 2023-08-22 DIAGNOSIS — Z12.31 SCREENING MAMMOGRAM, ENCOUNTER FOR: ICD-10-CM

## 2023-08-22 LAB
BASOPHILS # BLD AUTO: 0.05 X10(3) UL (ref 0–0.2)
BASOPHILS NFR BLD AUTO: 1.1 %
D DIMER PPP FEU-MCNC: <0.27 UG/ML FEU (ref ?–0.75)
DEPRECATED RDW RBC AUTO: 45.3 FL (ref 35.1–46.3)
EOSINOPHIL # BLD AUTO: 0.34 X10(3) UL (ref 0–0.7)
EOSINOPHIL NFR BLD AUTO: 7.2 %
ERYTHROCYTE [DISTWIDTH] IN BLOOD BY AUTOMATED COUNT: 13.3 % (ref 11–15)
HCT VFR BLD AUTO: 39.5 %
HGB BLD-MCNC: 13.6 G/DL
IMM GRANULOCYTES # BLD AUTO: 0.01 X10(3) UL (ref 0–1)
IMM GRANULOCYTES NFR BLD: 0.2 %
LYMPHOCYTES # BLD AUTO: 0.88 X10(3) UL (ref 1–4)
LYMPHOCYTES NFR BLD AUTO: 18.5 %
MCH RBC QN AUTO: 32.1 PG (ref 26–34)
MCHC RBC AUTO-ENTMCNC: 34.4 G/DL (ref 31–37)
MCV RBC AUTO: 93.2 FL
MONOCYTES # BLD AUTO: 0.55 X10(3) UL (ref 0.1–1)
MONOCYTES NFR BLD AUTO: 11.6 %
NEUTROPHILS # BLD AUTO: 2.92 X10 (3) UL (ref 1.5–7.7)
NEUTROPHILS # BLD AUTO: 2.92 X10(3) UL (ref 1.5–7.7)
NEUTROPHILS NFR BLD AUTO: 61.4 %
PLATELET # BLD AUTO: 233 10(3)UL (ref 150–450)
RBC # BLD AUTO: 4.24 X10(6)UL
WBC # BLD AUTO: 4.8 X10(3) UL (ref 4–11)

## 2023-08-22 PROCEDURE — 36415 COLL VENOUS BLD VENIPUNCTURE: CPT

## 2023-08-22 PROCEDURE — 85379 FIBRIN DEGRADATION QUANT: CPT

## 2023-08-22 PROCEDURE — 85025 COMPLETE CBC W/AUTO DIFF WBC: CPT | Performed by: INTERNAL MEDICINE

## 2023-08-22 PROCEDURE — 99214 OFFICE O/P EST MOD 30 MIN: CPT | Performed by: INTERNAL MEDICINE

## 2023-09-02 ENCOUNTER — MED REC SCAN ONLY (OUTPATIENT)
Dept: FAMILY MEDICINE CLINIC | Facility: CLINIC | Age: 78
End: 2023-09-02

## 2023-09-05 ENCOUNTER — OFFICE VISIT (OUTPATIENT)
Dept: FAMILY MEDICINE CLINIC | Facility: CLINIC | Age: 78
End: 2023-09-05

## 2023-09-05 VITALS
WEIGHT: 113 LBS | HEIGHT: 61 IN | HEART RATE: 79 BPM | SYSTOLIC BLOOD PRESSURE: 126 MMHG | BODY MASS INDEX: 21.34 KG/M2 | DIASTOLIC BLOOD PRESSURE: 82 MMHG

## 2023-09-05 DIAGNOSIS — T78.40XD ALLERGIC REACTION, SUBSEQUENT ENCOUNTER: Primary | ICD-10-CM

## 2023-09-05 PROCEDURE — 99213 OFFICE O/P EST LOW 20 MIN: CPT | Performed by: FAMILY MEDICINE

## 2023-09-05 PROCEDURE — 1126F AMNT PAIN NOTED NONE PRSNT: CPT | Performed by: FAMILY MEDICINE

## 2023-09-05 RX ORDER — PREDNISONE 10 MG/1
10 TABLET ORAL 3 TIMES DAILY
Qty: 15 TABLET | Refills: 1 | Status: SHIPPED | OUTPATIENT
Start: 2023-09-05 | End: 2023-09-08

## 2023-09-05 NOTE — PROGRESS NOTES
Blood pressure 126/82, pulse 79, height 5' 1\" (1.549 m), weight 113 lb (51.3 kg). Patient is following up for rash around the eyes. Began yesterday got better. Some itching. No known allergies.     Objective erythematous dry macular rash periorbital bilaterally    Produce photographs where it was much more impressive    Assessment dermatitis contact versus otherwise    Plan prednisone prescription allergist referral

## 2023-09-08 ENCOUNTER — TELEPHONE (OUTPATIENT)
Dept: FAMILY MEDICINE CLINIC | Facility: CLINIC | Age: 78
End: 2023-09-08

## 2023-09-08 RX ORDER — DESOXIMETASONE 0.5 MG/G
1 CREAM TOPICAL 2 TIMES DAILY
Qty: 60 G | Refills: 0 | Status: SHIPPED | OUTPATIENT
Start: 2023-09-08

## 2023-09-08 NOTE — TELEPHONE ENCOUNTER
Pt stated she was seen for Rash,taking Prednisone - have taken a total of 5 but feels miserable-s/s can't sleep,eyes burning , last dose yesterday about 3:30,stated cream for rash has help,less redness around the eyes, stated she's better but not 100 %   Do not want to take any more Prednisone

## 2023-09-12 ENCOUNTER — PATIENT OUTREACH (OUTPATIENT)
Dept: CASE MANAGEMENT | Age: 78
End: 2023-09-12

## 2023-09-12 DIAGNOSIS — M94.9 DISORDER OF BONE AND CARTILAGE: ICD-10-CM

## 2023-09-12 DIAGNOSIS — F32.1 CURRENT MODERATE EPISODE OF MAJOR DEPRESSIVE DISORDER, UNSPECIFIED WHETHER RECURRENT (HCC): ICD-10-CM

## 2023-09-12 DIAGNOSIS — M89.9 DISORDER OF BONE AND CARTILAGE: ICD-10-CM

## 2023-09-12 DIAGNOSIS — G40.309 NONINTRACTABLE GENERALIZED IDIOPATHIC EPILEPSY WITHOUT STATUS EPILEPTICUS (HCC): ICD-10-CM

## 2023-09-12 DIAGNOSIS — I10 PRIMARY HYPERTENSION: Primary | ICD-10-CM

## 2023-09-12 DIAGNOSIS — F41.1 GAD (GENERALIZED ANXIETY DISORDER): ICD-10-CM

## 2023-09-27 ENCOUNTER — OFFICE VISIT (OUTPATIENT)
Dept: NEUROLOGY | Facility: CLINIC | Age: 78
End: 2023-09-27
Payer: MEDICARE

## 2023-09-27 VITALS — BODY MASS INDEX: 21.34 KG/M2 | WEIGHT: 113 LBS | HEIGHT: 61 IN

## 2023-09-27 DIAGNOSIS — F32.A ANXIETY AND DEPRESSION: ICD-10-CM

## 2023-09-27 DIAGNOSIS — G40.802 EPILEPSY WITH BOTH GENERALIZED AND FOCAL FEATURES (HCC): Primary | ICD-10-CM

## 2023-09-27 DIAGNOSIS — F41.9 ANXIETY AND DEPRESSION: ICD-10-CM

## 2023-09-27 DIAGNOSIS — G62.9 LENGTH-DEPENDENT PERIPHERAL NEUROPATHY: ICD-10-CM

## 2023-09-27 PROCEDURE — 99214 OFFICE O/P EST MOD 30 MIN: CPT | Performed by: OTHER

## 2023-09-27 RX ORDER — ZONISAMIDE 100 MG/1
100 CAPSULE ORAL 2 TIMES DAILY
Qty: 180 CAPSULE | Refills: 3 | Status: SHIPPED | OUTPATIENT
Start: 2023-09-27 | End: 2024-09-21

## 2023-10-02 ENCOUNTER — LAB ENCOUNTER (OUTPATIENT)
Dept: LAB | Age: 78
End: 2023-10-02
Attending: FAMILY MEDICINE
Payer: MEDICARE

## 2023-10-02 DIAGNOSIS — R73.01 IFG (IMPAIRED FASTING GLUCOSE): ICD-10-CM

## 2023-10-02 DIAGNOSIS — I10 PRIMARY HYPERTENSION: ICD-10-CM

## 2023-10-02 LAB
ALBUMIN SERPL-MCNC: 3.5 G/DL (ref 3.4–5)
ALBUMIN/GLOB SERPL: 1.2 {RATIO} (ref 1–2)
ALP LIVER SERPL-CCNC: 64 U/L
ALT SERPL-CCNC: 18 U/L
ANION GAP SERPL CALC-SCNC: 8 MMOL/L (ref 0–18)
AST SERPL-CCNC: 14 U/L (ref 15–37)
BILIRUB SERPL-MCNC: 1 MG/DL (ref 0.1–2)
BUN BLD-MCNC: 12 MG/DL (ref 7–18)
BUN/CREAT SERPL: 16.2 (ref 10–20)
CALCIUM BLD-MCNC: 9.3 MG/DL (ref 8.5–10.1)
CHLORIDE SERPL-SCNC: 113 MMOL/L (ref 98–112)
CHOLEST SERPL-MCNC: 136 MG/DL (ref ?–200)
CO2 SERPL-SCNC: 23 MMOL/L (ref 21–32)
CREAT BLD-MCNC: 0.74 MG/DL
EGFRCR SERPLBLD CKD-EPI 2021: 84 ML/MIN/1.73M2 (ref 60–?)
EST. AVERAGE GLUCOSE BLD GHB EST-MCNC: 105 MG/DL (ref 68–126)
FASTING PATIENT LIPID ANSWER: YES
FASTING STATUS PATIENT QL REPORTED: YES
GLOBULIN PLAS-MCNC: 2.9 G/DL (ref 2.8–4.4)
GLUCOSE BLD-MCNC: 103 MG/DL (ref 70–99)
HBA1C MFR BLD: 5.3 % (ref ?–5.7)
HDLC SERPL-MCNC: 41 MG/DL (ref 40–59)
LDLC SERPL CALC-MCNC: 79 MG/DL (ref ?–100)
NONHDLC SERPL-MCNC: 95 MG/DL (ref ?–130)
OSMOLALITY SERPL CALC.SUM OF ELEC: 298 MOSM/KG (ref 275–295)
POTASSIUM SERPL-SCNC: 3.8 MMOL/L (ref 3.5–5.1)
PROT SERPL-MCNC: 6.4 G/DL (ref 6.4–8.2)
SODIUM SERPL-SCNC: 144 MMOL/L (ref 136–145)
TRIGL SERPL-MCNC: 83 MG/DL (ref 30–149)
TSI SER-ACNC: 3.15 MIU/ML (ref 0.36–3.74)
VLDLC SERPL CALC-MCNC: 13 MG/DL (ref 0–30)

## 2023-10-02 PROCEDURE — 84443 ASSAY THYROID STIM HORMONE: CPT

## 2023-10-02 PROCEDURE — 36415 COLL VENOUS BLD VENIPUNCTURE: CPT

## 2023-10-02 PROCEDURE — 80061 LIPID PANEL: CPT

## 2023-10-02 PROCEDURE — 80053 COMPREHEN METABOLIC PANEL: CPT

## 2023-10-02 PROCEDURE — 83036 HEMOGLOBIN GLYCOSYLATED A1C: CPT

## 2023-10-05 ENCOUNTER — OFFICE VISIT (OUTPATIENT)
Dept: FAMILY MEDICINE CLINIC | Facility: CLINIC | Age: 78
End: 2023-10-05

## 2023-10-05 VITALS
SYSTOLIC BLOOD PRESSURE: 97 MMHG | WEIGHT: 113 LBS | BODY MASS INDEX: 21.34 KG/M2 | DIASTOLIC BLOOD PRESSURE: 64 MMHG | HEART RATE: 82 BPM | HEIGHT: 61 IN

## 2023-10-05 DIAGNOSIS — Z12.11 ENCOUNTER FOR SCREENING COLONOSCOPY: Primary | ICD-10-CM

## 2023-10-05 PROCEDURE — 1126F AMNT PAIN NOTED NONE PRSNT: CPT | Performed by: FAMILY MEDICINE

## 2023-10-05 PROCEDURE — 99213 OFFICE O/P EST LOW 20 MIN: CPT | Performed by: FAMILY MEDICINE

## 2023-10-05 NOTE — PROGRESS NOTES
Blood pressure 97/64, pulse 82, height 5' 1\" (1.549 m), weight 113 lb (51.3 kg). Following up for hypertension feels well. Some issues with her allergies using Benadryl. Otherwise feels well.     Objective comfortable no apparent distress    Assessment hypertension currently with low blood pressure    Plan stop lisinopril follow-up 2 to 3 weeks

## 2023-10-11 ENCOUNTER — MED REC SCAN ONLY (OUTPATIENT)
Dept: FAMILY MEDICINE CLINIC | Facility: CLINIC | Age: 78
End: 2023-10-11

## 2023-10-11 ENCOUNTER — PATIENT OUTREACH (OUTPATIENT)
Dept: CASE MANAGEMENT | Age: 78
End: 2023-10-11

## 2023-10-11 DIAGNOSIS — M94.9 DISORDER OF BONE AND CARTILAGE: ICD-10-CM

## 2023-10-11 DIAGNOSIS — F32.1 CURRENT MODERATE EPISODE OF MAJOR DEPRESSIVE DISORDER, UNSPECIFIED WHETHER RECURRENT (HCC): ICD-10-CM

## 2023-10-11 DIAGNOSIS — F41.1 GAD (GENERALIZED ANXIETY DISORDER): ICD-10-CM

## 2023-10-11 DIAGNOSIS — J30.1 SEASONAL ALLERGIC RHINITIS DUE TO POLLEN: ICD-10-CM

## 2023-10-11 DIAGNOSIS — M89.9 DISORDER OF BONE AND CARTILAGE: ICD-10-CM

## 2023-10-11 DIAGNOSIS — I10 PRIMARY HYPERTENSION: Primary | ICD-10-CM

## 2023-10-11 DIAGNOSIS — G40.309 NONINTRACTABLE GENERALIZED IDIOPATHIC EPILEPSY WITHOUT STATUS EPILEPTICUS (HCC): ICD-10-CM

## 2023-10-11 DIAGNOSIS — H25.13 AGE-RELATED NUCLEAR CATARACT OF BOTH EYES: ICD-10-CM

## 2023-10-11 NOTE — PROGRESS NOTES
10/11/2023  Spoke to Sabrina for [facility]. Updates to patient care team/ comments: UTD  Patient reported changes in medications: (Removed Prednisolone opht)  Med Adherence  Comment: Taking as directed    Health Maintenance:    Zoster Vaccines(1 of 2) Never done (Discussed with patient)  Mammogram due on 07/14/2023 (Discussed with patient)  COVID-19 Vaccine(6 - 2023-24 season) due on 09/01/2023 (Discussed with patient)  Annual Physical due on 09/19/2023 (Completed on 10/05/23)  Influenza Vaccine(1) due on 10/01/2023 (Patient states completed on10/05/23)  DEXA Scan Completed  Annual Depression Screening Completed  Fall Risk Screening (Annual) Completed  Pneumococcal Vaccine: 65+ Years Completed  Colorectal Cancer Screening Discontinued    Patient current concerns: Patient voiced no new health concerns; she was recently seen by Neurologist Dr. Charisma Amaya on 09/27/23 for a follow up on her medications. Patient has been doing well since starting Zonisamide, her gait has improved. Patient continues to use a walker to prevent falls, she is now able to take a few steps without the use of the walker. Patient will be due for a follow up in 6 months. Patient states she was able to reach out to Angela Angus for a psychiatrist. Patient does not have a set date for an appointment, she was told on the week of October 17th she will be contacted to set up an appointment. Patient continues to experience stress due to 's health; he has good and bad days. He was recently experiencing panic attacks which have improved since being prescribed a medication. Patient reports recently being seen by PCP Dr. Mcgraw Rm 10/05/23 for a Medicare physical, her blood pressure at that time was low. Patient was instructed to d/c Lisinopril and follow up 3-4 weeks for blood pressure check. Patient is not monitoring her blood pressure at home since discontinuing the medication, denies feeling different or developing any symptoms.  Patient also discussed allergies; she was recommended to start Xyzal which she has been taking in the morning however has not noticed much improvement. Patient states she received the Influenza vaccine that day as well, her arm was sore however the discomfort has subsided. being compliant with her medications, does not currently need refills. Patient is following a low sodium diet and keeping up with her water intake. Patient denies feeling depressed of anxious, she does feel stressed at times due to her 's health conditions. Patient is able to do most of her ADL tasks however does receive assistance from  and family. Patient denies recent falls, she uses a walker to prevent falls. Goals/Action Plan: Active goal from previous outreach: Increase her physical activity, working on short walks without assistance     Patient reported progress towards goals: slowly progressing, she is able to take a few steps without the use of the walker               - What: Increase her physical activity, working on short walks without assistance           - Where/When/How: She will work on in-home exercises and slowly increase steps without the use of the walker. Patient Reported Barriers and Concerns: gait issues                   - Plan for overcoming barriers: work on strengthening. Care managers interventions: Clinical staff informed of influenza vaccine needing documentation, also for PCP to update diagnosis code for Medicare physical office visit. Reviewed care team, health maintenance and updated medication list. Listened to patients concerns regarding her 's health and provided support. Continued to provide education on how to better manage and cope with chronic conditions. Informed of the importance on reporting any new symptoms to prevent any health complications. Appointments reviewed with patient.    Future Appointments   Date Time Provider Maira Coy   10/20/2023  4:10 PM David Bell DO [facility] [place]   8/22/2024  3:00 PM Katt Grey MD [address] [place] [address] ONC [facility]        Next Care Manager Follow Up Date: One month    Reason For Follow Up: review progress and or barriers towards patient's goals. Time Spent This Encounter Total: 33 min medical record review, telephone communication, care plan updates where needed, education, goals, and action plan recreation/update. Provided acknowledgment and validation to patient's concerns. Monthly Minute Total including today: 33 min  Physical assessment, complete health history, and need for CCM established by Gregg Wheat.  DO Josefina.

## 2023-10-23 ENCOUNTER — OFFICE VISIT (OUTPATIENT)
Dept: FAMILY MEDICINE CLINIC | Facility: CLINIC | Age: 78
End: 2023-10-23
Payer: MEDICARE

## 2023-10-23 VITALS
HEIGHT: 61 IN | SYSTOLIC BLOOD PRESSURE: 117 MMHG | BODY MASS INDEX: 21.52 KG/M2 | WEIGHT: 114 LBS | DIASTOLIC BLOOD PRESSURE: 79 MMHG | HEART RATE: 79 BPM

## 2023-10-23 DIAGNOSIS — I10 PRIMARY HYPERTENSION: ICD-10-CM

## 2023-10-23 DIAGNOSIS — S03.00XA DISLOCATION OF TEMPOROMANDIBULAR JOINT, INITIAL ENCOUNTER: Primary | ICD-10-CM

## 2023-10-23 DIAGNOSIS — R73.01 IFG (IMPAIRED FASTING GLUCOSE): ICD-10-CM

## 2023-10-23 DIAGNOSIS — E03.9 HYPOTHYROIDISM, UNSPECIFIED TYPE: ICD-10-CM

## 2023-10-23 PROCEDURE — 1125F AMNT PAIN NOTED PAIN PRSNT: CPT | Performed by: FAMILY MEDICINE

## 2023-10-23 PROCEDURE — 99213 OFFICE O/P EST LOW 20 MIN: CPT | Performed by: FAMILY MEDICINE

## 2023-10-23 NOTE — PROGRESS NOTES
Blood pressure 117/79, pulse 79, height 5' 1\" (1.549 m), weight 114 lb (51.7 kg). Patient presents today complaining of pain in the right jaw area. No dysphonia no dysphagia. No nasal congestion.     Objective bilateral tympanic membranes intact no redness    Tenderness noted TMJ right side    No lymph node enlargement    Throat clear nonerythematous    No mastoid tenderness    No lymphadenopathy    Assessment TMJ blood pressure controlled off meds    Plan ibuprofen or Tylenol soft diet printed information given follow-up in 6 months for blood test and appointment

## 2023-11-14 ENCOUNTER — PATIENT OUTREACH (OUTPATIENT)
Dept: CASE MANAGEMENT | Age: 78
End: 2023-11-14

## 2023-11-14 DIAGNOSIS — I10 PRIMARY HYPERTENSION: Primary | ICD-10-CM

## 2023-11-14 DIAGNOSIS — M89.9 DISORDER OF BONE AND CARTILAGE: ICD-10-CM

## 2023-11-14 DIAGNOSIS — H25.13 AGE-RELATED NUCLEAR CATARACT OF BOTH EYES: ICD-10-CM

## 2023-11-14 DIAGNOSIS — F32.1 CURRENT MODERATE EPISODE OF MAJOR DEPRESSIVE DISORDER, UNSPECIFIED WHETHER RECURRENT (HCC): ICD-10-CM

## 2023-11-14 DIAGNOSIS — F41.1 GAD (GENERALIZED ANXIETY DISORDER): ICD-10-CM

## 2023-11-14 DIAGNOSIS — M94.9 DISORDER OF BONE AND CARTILAGE: ICD-10-CM

## 2023-11-14 DIAGNOSIS — G40.309 NONINTRACTABLE GENERALIZED IDIOPATHIC EPILEPSY WITHOUT STATUS EPILEPTICUS (HCC): ICD-10-CM

## 2023-11-14 NOTE — PROGRESS NOTES
11/14/2023  Spoke to Sabrina for [facility]. Updates to patient care team/ comments: UTD  Patient reported changes in medications: None  Med Adherence  Comment: Taking as directed    Health Maintenance:     Health Maintenance   Topic Date Due    Zoster Vaccines (1 of 2) Never done (discussed with patient)    Mammogram  07/14/2023 (Scheduled)    COVID-19 Vaccine (6 - 2023-24 season) 09/01/2023 (Reconciled to chart)    Annual Physical  09/19/2023 (discussed with patient)    Influenza Vaccine  Completed    DEXA Scan  Completed    Annual Depression Screening  Completed    Fall Risk Screening (Annual)  Completed    Pneumococcal Vaccine: 65+ Years  Completed    Colorectal Cancer Screening  Discontinued     Patient current concerns: Patient voiced concern with 's health, his memory has been worsening and has become obsessed with his weight. Patient becomes frustrated with his demands; he becomes impatient very quickly. Patient has reached out to [facility]; she has an appointment with a therapist on 11/7/23. Patient was seen by PCP Dr. Cee Evans for blood pressure check and TMJ pain. Patient states Dr. Cee Evans had discontinued lisinopril on 10/05/23 due to low blood pressure readings. Patient states her blood pressure has been stable since discontinuing the medication. Patient is due for a Medicare physical; she will wait until next year due to feeling overwhelmed with her 's multiple appointments and relying on her daughter or son for transportation. Patient reports feeling well since starting Zonisamide, her gait has improved. Patient continues to use a walker to prevent falls, she is now able to take a few steps without the use of the walker. Patient reports being compliant with her medication regimen, does not currently need refills. Patient is following a low sodium diet and keeping up with her water intake.  Patient lives with her , she is able to do most of her ADL tasks however does receive assistance from  and family. Patient denies recent falls, she uses a walker to prevent falls. Goals/Action Plan: Active goal from previous outreach: Increase her physical activity, working on short walks without assistance    Patient reported progress towards goals:slowly progressing               - What: manage anxiety           - Where/When/How: follow up with nawaf jose rafael and try breathing exercises  Patient Reported Barriers and Concerns: husbands health                   - Plan for overcoming barriers: will try to have patience       Care managers interventions: Reconciled chart using care everywhere, updated immunization record. Reviewed care team, health maintenance, and medication list. Listened to patients concerns regarding her 's health and provided support. Continued to provide education on how to better manage and cope with chronic conditions. Informed of the importance on reporting any new symptoms to prevent any health complications. Appointments reviewed with patient. Future Appointments   Date Time Provider Maira Coy   11/17/2023  1:00 PM Tabatha Woodward LCSW [facility]   2/19/2024 12:00 PM [facility]   8/22/2024  3:00 PM Mandeep Gaxiola MD [facility] HEM [facility] E[facility]        Next Care Manager Follow Up Date: One month    Reason For Follow Up: review progress and or barriers towards patient's goals. Time Spent This Encounter Total: 26 min medical record review, telephone communication, care plan updates where needed, education, goals, and action plan recreation/update. Provided acknowledgment and validation to patient's concerns. Monthly Minute Total including today: 26 min  Physical assessment, complete health history, and need for CCM established by Louie Culp.  DO Josefina.

## 2023-12-14 ENCOUNTER — PATIENT OUTREACH (OUTPATIENT)
Dept: CASE MANAGEMENT | Age: 78
End: 2023-12-14

## 2023-12-14 DIAGNOSIS — F32.1 CURRENT MODERATE EPISODE OF MAJOR DEPRESSIVE DISORDER, UNSPECIFIED WHETHER RECURRENT (HCC): ICD-10-CM

## 2023-12-14 DIAGNOSIS — J30.1 SEASONAL ALLERGIC RHINITIS DUE TO POLLEN: ICD-10-CM

## 2023-12-14 DIAGNOSIS — F41.1 GAD (GENERALIZED ANXIETY DISORDER): ICD-10-CM

## 2023-12-14 DIAGNOSIS — G40.309 NONINTRACTABLE GENERALIZED IDIOPATHIC EPILEPSY WITHOUT STATUS EPILEPTICUS (HCC): ICD-10-CM

## 2023-12-14 DIAGNOSIS — M89.9 DISORDER OF BONE AND CARTILAGE: ICD-10-CM

## 2023-12-14 DIAGNOSIS — I10 PRIMARY HYPERTENSION: Primary | ICD-10-CM

## 2023-12-14 DIAGNOSIS — M94.9 DISORDER OF BONE AND CARTILAGE: ICD-10-CM

## 2023-12-14 NOTE — PROGRESS NOTES
12/14/2023  Spoke to Sabrina for CCM. Updates to patient care team/ comments: UTD  Patient reported changes in medications: None  Med Adherence  Comment: Taking as directed    Health Maintenance:     Health Maintenance   Topic Date Due    Zoster Vaccines (1 of 2) Never done    Mammogram  07/14/2023    Annual Physical  09/19/2023    Influenza Vaccine  Completed    DEXA Scan  Completed    Annual Depression Screening  Completed    Fall Risk Screening (Annual)  Completed    Pneumococcal Vaccine: 65+ Years  Completed    COVID-19 Vaccine  Completed    Colorectal Cancer Screening  Discontinued       Patient current concerns: Patient continues to worry about her 's health which has caused her stress. Patient is currently taking medication for depression which has been helping. Patient states she is monitoring her blood pressure at home with readings stables. Patient reports being compliant with her medication regimen, does not currently need refills. Patient is following a low sodium diet and keeping up with her water intake. Patient lives with her , she is able to do some of her ADL tasks however does receive assistance from  and family. Patient denies recent falls, she uses a walker to prevent falls. Her physical activity is limited due to her leg. Patient consumes 2-3 small portions of meals a day. Goals/Action Plan: Active goal from previous outreach: manage anxiety    Patient reported progress towards goals: slowly progressing, has good and bad days               - What: work on in-home stretches           - Where/When/How: work on stretches daily  Patient Reported Barriers and Concerns: No barriers reported                   - Plan for overcoming barriers: N/A      Care managers interventions: Reviewed care team, health maintenance, and medication list. Informed of Shingrix vaccine and coverage from Medicare at [employer].  Listened to patient's concerns regarding her 's health and provided support. Continued to provide education on how to better manage and cope with chronic conditions. Informed of the importance of reporting any new symptoms to prevent any health complications. Appointments reviewed with patient. Future Appointments   Date Time Provider Maira Coy   12/19/2023 11:00 AM Deb Murphy [facility] [place]   2/19/2024 12:00 PM [facility] RM5 [facility] EM [facility]   8/22/2024  3:00 PM Donald Celestin MD [facility] HEM ONC EMO        Next Care Manager Follow Up Date: One month    Reason For Follow Up: review progress and or barriers towards patient's goals. Time Spent This Encounter Total: 23 min medical record review, telephone communication, care plan updates where needed, education, goals, and action plan recreation/update. Provided acknowledgment and validation to patient's concerns. Monthly Minute Total including today: 27 min  Physical assessment, complete health history, and need for CCM established by Tara Mascorro.  DO Josefina.

## 2023-12-14 NOTE — PROGRESS NOTES
Contacting patient to follow up on CCM for the month, left message to call back. Care everywhere utilized to update chart. Reviewed pt's chart including recent visits.       Pt is due for:   Health Maintenance   Topic Date Due    Zoster Vaccines (1 of 2) Never done     Mammogram  07/14/2023    Annual Physical  09/19/2023    Influenza Vaccine  Completed    DEXA Scan  Completed    Annual Depression Screening  Completed    Fall Risk Screening (Annual)  Completed    Pneumococcal Vaccine: 65+ Years  Completed    COVID-19 Vaccine  Completed    Colorectal Cancer Screening  Discontinued       Future Appointments   Date Time Provider Maira Coy   12/19/2023 11:00 AM Abbi Murphy [facility] [place]   2/19/2024 12:00 PM [facility] RM5 [facility] EM [facility]   8/22/2024  3:00 PM Maru Rosado MD [address] [place] [address] ONC EMO        Total time - 4 min  Total Monthly time- 4 min

## 2024-01-17 ENCOUNTER — PATIENT OUTREACH (OUTPATIENT)
Dept: CASE MANAGEMENT | Age: 79
End: 2024-01-17

## 2024-01-17 DIAGNOSIS — M94.9 DISORDER OF BONE AND CARTILAGE: ICD-10-CM

## 2024-01-17 DIAGNOSIS — I10 PRIMARY HYPERTENSION: Primary | ICD-10-CM

## 2024-01-17 DIAGNOSIS — H25.13 AGE-RELATED NUCLEAR CATARACT OF BOTH EYES: ICD-10-CM

## 2024-01-17 DIAGNOSIS — F32.1 CURRENT MODERATE EPISODE OF MAJOR DEPRESSIVE DISORDER, UNSPECIFIED WHETHER RECURRENT (HCC): ICD-10-CM

## 2024-01-17 DIAGNOSIS — F41.1 GAD (GENERALIZED ANXIETY DISORDER): ICD-10-CM

## 2024-01-17 DIAGNOSIS — M89.9 DISORDER OF BONE AND CARTILAGE: ICD-10-CM

## 2024-01-17 NOTE — PROGRESS NOTES
1/17/2024  Spoke to Javier for [facility].      Updates to patient care team/ comments: Reviewed with patient (Removed Gloria PERALES, Dr. Blanca, Dr. Butcher, Dr. Munguia)  Patient reported changes in medications: None  Med Adherence  Comment: Taking as directed    Health Maintenance:  Reviewed with patient.  Health Maintenance   Topic Date Due    Zoster Vaccines (1 of 2) Never done     Mammogram  07/14/2023  Scheduled    Annual Physical  09/19/2023    Annual Depression Screening  01/01/2024  Completed    Fall Risk Screening (Annual)  01/01/2024  Completed    Influenza Vaccine  Completed    DEXA Scan  Completed    Pneumococcal Vaccine: 65+ Years  Completed    COVID-19 Vaccine  Completed    Colorectal Cancer Screening  Discontinued       Patient current concerns: Patient reports undergoing issues with her teeth, she was seen by her dentist who noted an infection with the bottom teeth but was unable to prescribe antibiotics. Patient was referred to see an oral surgeon; she is scheduled for an appointment on 01/19/24. Patient was informed that she might need to have 2 to 3 teeth extracted.  Patient will need to have an appointment coordinated with the oral surgeon and the dentist on the same date when the teeth extraction is completed. Patient plans to discuss the scheduling of these appointments with the oral surgeon during her upcoming appointment. Patient also mentioned not having dental insurance and was informed of potential out-of-pocket expenses of up to $10,000, for which she applied for credit using [email or web address] and was approved.    Patient continues to worry about her 's health, stating that he is currently stable but has episodes of memory issues. Patient had been seeing a therapist through [facility], which she completed 2-3 in-person visits and 2-3 virtual visits, but her scheduled telemedicine appointment at the beginning of the year was not completed. Patient states she never received a call, her  daughter Niecy has been trying to restart the therapy by reaching out to [facility]. Patient mentioned not feeling depressed or anxious at the moment.    Patient reports being compliant with her medication regimen, she does not currently need refills. Patient's medications are managed by her daughter, who helps sort them out for her. Patient denies recent falls, she does have gait issues in which she uses a walker.     Patient has an upcoming appointment with ophthalmologist Dr. Soto, she hopes to discuss left eye cataract surgery and schedule the procedure for March or April.      Goals/Action Plan:    Active goal from previous outreach: work on in-home stretches    Patient reported progress towards goals: slowly progressing, continues to work towards goals               - What: work on in-home stretches           - Where/When/How: work on stretches daily  Patient Reported Barriers and Concerns: no barriers reported                   - Plan for overcoming barriers: N/A      Care managers interventions: Reconciled chart using care everywhere, no updates noted. Reviewed care team, medication list, and updated health maintenance by documenting fall assessment and depression screening. Listened to patient's concerns regarding her 's health and provided support. Continued to provide education on how to better manage and cope with chronic conditions. Informed of the importance of reporting any new symptoms to prevent any health complications.      Appointments reviewed with patient.   Future Appointments   Date Time Provider Department Center   2/19/2024 12:00 PM [address]   3/26/2024 12:00 PM Caryn Corona DO [facility] [place] [facility]   8/22/2024  3:00 PM Dominic Manzo MD [facility] HEM ONC EMO        Next Care Manager Follow Up Date: One month    Reason For Follow Up: review progress and or barriers towards patient's goals.     Time Spent This Encounter Total: 26 min medical record review, telephone  communication, care plan updates where needed, education, goals, and action plan recreation/update. Provided acknowledgment and validation to patient's concerns.   Monthly Minute Total including today: 26 min  Physical assessment, complete health history, and need for CCM established by Da Rocha DO.

## 2024-01-24 ENCOUNTER — PATIENT MESSAGE (OUTPATIENT)
Dept: FAMILY MEDICINE CLINIC | Facility: CLINIC | Age: 79
End: 2024-01-24

## 2024-01-25 RX ORDER — LEVOCETIRIZINE DIHYDROCHLORIDE 5 MG/1
5 TABLET, FILM COATED ORAL EVERY EVENING
Qty: 90 TABLET | Refills: 3 | Status: SHIPPED | OUTPATIENT
Start: 2024-01-25

## 2024-01-25 NOTE — TELEPHONE ENCOUNTER
Dr. Rocha, please see [facility] message below and advise on prescription pended for review or other recommendations.      Per office visit 10/05/23, patient to start Xyzal:   Da Rocha DO   Physician  Specialty: Family Medicine     Patient Instructions  Addendum     Encounter Date: 10/5/2023       XYZAL 5MG ONE TAB DAILY      STOP LISINOPRIL

## 2024-02-14 ENCOUNTER — PATIENT OUTREACH (OUTPATIENT)
Dept: CASE MANAGEMENT | Age: 79
End: 2024-02-14

## 2024-02-14 DIAGNOSIS — F41.1 GAD (GENERALIZED ANXIETY DISORDER): ICD-10-CM

## 2024-02-14 DIAGNOSIS — G40.309 NONINTRACTABLE GENERALIZED IDIOPATHIC EPILEPSY WITHOUT STATUS EPILEPTICUS (HCC): ICD-10-CM

## 2024-02-14 DIAGNOSIS — M94.9 DISORDER OF BONE AND CARTILAGE: ICD-10-CM

## 2024-02-14 DIAGNOSIS — I10 PRIMARY HYPERTENSION: Primary | ICD-10-CM

## 2024-02-14 DIAGNOSIS — H25.13 AGE-RELATED NUCLEAR CATARACT OF BOTH EYES: ICD-10-CM

## 2024-02-14 DIAGNOSIS — M89.9 DISORDER OF BONE AND CARTILAGE: ICD-10-CM

## 2024-02-14 DIAGNOSIS — F32.1 CURRENT MODERATE EPISODE OF MAJOR DEPRESSIVE DISORDER, UNSPECIFIED WHETHER RECURRENT (HCC): ICD-10-CM

## 2024-02-14 NOTE — PROGRESS NOTES
2/14/2024  Spoke to Javier for [facility].      Updates to patient care team/ comments: UTD   Patient reported changes in medications: None  Med Adherence  Comment: Taking as directed    Health Maintenance:  Reviewed with patient.   Health Maintenance   Topic Date Due    Zoster Vaccines (1 of 2) Never done    Mammogram  07/14/2023  Scheduled    Annual Physical  09/19/2023    Influenza Vaccine  Completed    DEXA Scan  Completed    Annual Depression Screening  Completed    Fall Risk Screening (Annual)  Completed    Pneumococcal Vaccine: 65+ Years  Completed    COVID-19 Vaccine  Completed    Colorectal Cancer Screening  Discontinued       Patient current concerns: Patient recently had tooth extractions and received a temporary bridge a week and a half ago, with bottom teeth and top left upper teeth removed by oral surgeon. Following this she received a temporary bridge from her dentist on the same day and reports feeling slightly better. She was having difficulty eating in the beginning, which has slowly been improving. She has a follow up in six weeks for the permanent bridge which she states will be glued on.     Patient continues to worry about her 's health, noting he is currently stable but continues to have episodes of memory issues. She does express feelings of pride in his cessation of smoking, which he initiated on his own. Patient continues to engage in therapy sessions with a therapist through [facility], she had a phone visit with her yesterday and will have another one in 2 weeks. Patient finds the sessions beneficial.     Overall, the patient reports feeling okay, she continues to have gait issues which she uses a walker. Patient states the majority of the time she stays on her recliner chair. She does help with meal prepping and some light household chores. Patient has support of her daughter and son in caring for both herself and .      Patient reports being compliant with her medication regimen,  she does not currently need refills states the medications are managed by her daughter, who helps sort them out for her. Patient lives with her  in a condo, she has assistance from her children with appointments, medications, and grocery shopping.  Goals/Action Plan:     Active goal from previous outreach: work on in-home stretches     Patient reported progress towards goals: slowly progressing, continues to work towards goals               - What: work on in-home stretches           - Where/When/How: work on stretches daily  Patient Reported Barriers and Concerns: no barriers reported                   - Plan for overcoming barriers: N/A        Care managers interventions:  Reviewed care team, medication list, and health maintenance. Reminded of Medicare physical overdue. Listened to patient's concerns regarding her 's health and provided support. Continued to provide education on how to better manage and cope with chronic conditions. Informed of the importance of reporting any new symptoms to prevent any health complications.      Appointments reviewed with patient.   Future Appointments   Date Time Provider Department Center   2/19/2024 12:00 PM [facility] RM5 [facility] EM [facility]   2/27/2024  2:00 PM Klaudia Murphy LCSW [facility] [place]   3/26/2024 12:40 PM Caryn Corona DO [facility] [place] [facility]   8/22/2024  3:00 PM Dominic Manzo MD [facility] HEM ONC EMO        Next Care Manager Follow Up Date: one month    Reason For Follow Up: review progress and or barriers towards patient's goals.     Time Spent This Encounter Total: 26 min medical record review, telephone communication, care plan updates where needed, education, goals, and action plan recreation/update. Provided acknowledgment and validation to patient's concerns.   Monthly Minute Total including today: 26 min  Physical assessment, complete health history, and need for CCM established by Da Rocha DO.

## 2024-02-19 ENCOUNTER — HOSPITAL ENCOUNTER (OUTPATIENT)
Dept: MAMMOGRAPHY | Facility: HOSPITAL | Age: 79
Discharge: HOME | End: 2024-02-19
Attending: INTERNAL MEDICINE
Payer: MEDICARE

## 2024-02-19 DIAGNOSIS — Z12.31 SCREENING MAMMOGRAM, ENCOUNTER FOR: ICD-10-CM

## 2024-02-19 PROCEDURE — 77063 BREAST TOMOSYNTHESIS BI: CPT | Performed by: INTERNAL MEDICINE

## 2024-02-19 PROCEDURE — 77067 SCR MAMMO BI INCL CAD: CPT | Performed by: INTERNAL MEDICINE

## 2024-03-04 ENCOUNTER — MED REC SCAN ONLY (OUTPATIENT)
Dept: FAMILY MEDICINE CLINIC | Facility: CLINIC | Age: 79
End: 2024-03-04

## 2024-03-14 ENCOUNTER — PATIENT OUTREACH (OUTPATIENT)
Dept: CASE MANAGEMENT | Age: 79
End: 2024-03-14

## 2024-03-14 DIAGNOSIS — F41.1 GAD (GENERALIZED ANXIETY DISORDER): ICD-10-CM

## 2024-03-14 DIAGNOSIS — M94.9 DISORDER OF BONE AND CARTILAGE: ICD-10-CM

## 2024-03-14 DIAGNOSIS — H25.13 AGE-RELATED NUCLEAR CATARACT OF BOTH EYES: ICD-10-CM

## 2024-03-14 DIAGNOSIS — I10 PRIMARY HYPERTENSION: Primary | ICD-10-CM

## 2024-03-14 DIAGNOSIS — M89.9 DISORDER OF BONE AND CARTILAGE: ICD-10-CM

## 2024-03-14 DIAGNOSIS — G40.309 NONINTRACTABLE GENERALIZED IDIOPATHIC EPILEPSY WITHOUT STATUS EPILEPTICUS (HCC): ICD-10-CM

## 2024-03-14 DIAGNOSIS — F32.1 CURRENT MODERATE EPISODE OF MAJOR DEPRESSIVE DISORDER, UNSPECIFIED WHETHER RECURRENT (HCC): ICD-10-CM

## 2024-03-14 RX ORDER — OFLOXACIN 3 MG/ML
2 SOLUTION/ DROPS OPHTHALMIC EVERY 4 HOURS
COMMUNITY
Start: 2024-02-27

## 2024-03-14 NOTE — PROGRESS NOTES
3/14/2024  Spoke to Javier for [facility].      Updates to patient care team/ comments: UTD  Patient reported changes in medications: None  Med Adherence  Comment: Taking as directed    Health Maintenance: Reviewed with patient.   Health Maintenance   Topic Date Due    Zoster Vaccines (1 of 2) Never done    Annual Physical  09/19/2023    Mammogram  02/19/2025    Influenza Vaccine  Completed    DEXA Scan  Completed    Annual Depression Screening  Completed    Fall Risk Screening (Annual)  Completed    Pneumococcal Vaccine: 65+ Years  Completed    COVID-19 Vaccine  Completed    Colorectal Cancer Screening  Discontinued       Patient current concerns: Spoke with the patient, who expressed feeling stressed due to her 's recent Alzheimer's diagnosis and declining health over the past months. While relieved with the diagnosis, she is concerned about the future. She received support from her family, who assist with grocery shopping and accompany them to appointment. Although her  has not been aggressive, she notices changes in his behavior such as spending more time watching TV, which is unlike him. The stress has led to weight loss, with her clothes fitting more loosely.     Patient underwent left eye cataract surgery by Dr. Soto on 03/10/24 and is currently using prescribed eye drops without experiencing any visual issues or complications.     She recently completed a routine mammogram on 02/19/24, with her daughter able to access the results through [employer].      BI-RADS CATEGORY:     DIAGNOSTIC CATEGORY 2--BENIGN FINDING:       RECOMMENDATIONS:   ROUTINE MAMMOGRAM AND CLINICAL EVALUATION IN 12 MONTHS.        Patient reports being compliant with her medication regimen, she does not currently need refills states the medications are managed by her daughter, who helps sort them out for her. Patient lives with her  in a condo, she has assistance from her children with appointments, medications, and grocery  shopping.  Goals/Action Plan:     Active goal from previous outreach: work on in-home stretches     Patient reported progress towards goals: slowly progressing, continues to work towards goals               - What: work on in-home stretches           - Where/When/How: work on stretches daily  Patient Reported Barriers and Concerns: no barriers reported                   - Plan for overcoming barriers: N/A        Care managers interventions:  Reviewed care team, medication list, and health maintenance. Listened to patient's concerns regarding her 's health and provided support. Continued to provide education on how to better manage and cope with chronic conditions. Informed of the importance of reporting any new symptoms to prevent any health complications.      Appointments reviewed with patient.   Future Appointments   Date Time Provider Department Center   3/18/2024  3:00 PM Klaudia Murphy LCSW [facility] [place]   3/26/2024 12:40 PM Caryn Corona DO [facility] [place] [facility]   4/9/2024  2:00 PM Klaudia Murphy LCSW [facility] [place]   8/22/2024  3:00 PM Dominic Manzo MD [facility] HEM ONC EMO        Next Care Manager Follow Up Date: One month    Reason For Follow Up: review progress and or barriers towards patient's goals.     Time Spent This Encounter Total: 22 min medical record review, telephone communication, care plan updates where needed, education, goals, and action plan recreation/update. Provided acknowledgment and validation to patient's concerns.   Monthly Minute Total including today: 22 min  Physical assessment, complete health history, and need for CCM established by Da Rocha DO.

## 2024-03-26 ENCOUNTER — LAB ENCOUNTER (OUTPATIENT)
Dept: LAB | Facility: HOSPITAL | Age: 79
End: 2024-03-26
Attending: Other
Payer: MEDICARE

## 2024-03-26 ENCOUNTER — OFFICE VISIT (OUTPATIENT)
Dept: NEUROLOGY | Facility: CLINIC | Age: 79
End: 2024-03-26
Payer: MEDICARE

## 2024-03-26 VITALS — HEIGHT: 61 IN | BODY MASS INDEX: 21.14 KG/M2 | WEIGHT: 112 LBS

## 2024-03-26 DIAGNOSIS — G62.9 LENGTH-DEPENDENT PERIPHERAL NEUROPATHY: ICD-10-CM

## 2024-03-26 DIAGNOSIS — F32.A ANXIETY AND DEPRESSION: ICD-10-CM

## 2024-03-26 DIAGNOSIS — G40.802 EPILEPSY WITH BOTH GENERALIZED AND FOCAL FEATURES (HCC): Primary | ICD-10-CM

## 2024-03-26 DIAGNOSIS — F41.9 ANXIETY AND DEPRESSION: ICD-10-CM

## 2024-03-26 LAB
AMMONIA PLAS-MCNC: 16 UMOL/L (ref 11–32)
FOLATE SERPL-MCNC: >24 NG/ML (ref 5.4–?)
VIT B12 SERPL-MCNC: 756 PG/ML (ref 211–911)

## 2024-03-26 PROCEDURE — 84207 ASSAY OF VITAMIN B-6: CPT

## 2024-03-26 PROCEDURE — 82607 VITAMIN B-12: CPT

## 2024-03-26 PROCEDURE — 83921 ORGANIC ACID SINGLE QUANT: CPT

## 2024-03-26 PROCEDURE — 84425 ASSAY OF VITAMIN B-1: CPT

## 2024-03-26 PROCEDURE — 82140 ASSAY OF AMMONIA: CPT

## 2024-03-26 PROCEDURE — 84446 ASSAY OF VITAMIN E: CPT

## 2024-03-26 PROCEDURE — 36415 COLL VENOUS BLD VENIPUNCTURE: CPT

## 2024-03-26 PROCEDURE — 82746 ASSAY OF FOLIC ACID SERUM: CPT

## 2024-03-26 PROCEDURE — 99214 OFFICE O/P EST MOD 30 MIN: CPT | Performed by: OTHER

## 2024-03-26 RX ORDER — SERTRALINE HYDROCHLORIDE 25 MG/1
TABLET, FILM COATED ORAL
Qty: 187 TABLET | Refills: 0 | Status: SHIPPED | OUTPATIENT
Start: 2024-03-26 | End: 2024-07-01

## 2024-03-26 RX ORDER — ZONISAMIDE 100 MG/1
100 CAPSULE ORAL 2 TIMES DAILY
Qty: 180 CAPSULE | Refills: 3 | Status: SHIPPED | OUTPATIENT
Start: 2024-03-26 | End: 2025-03-21

## 2024-03-26 NOTE — PROGRESS NOTES
[facility]  [address], [address]  [place] [address]  [phone]          Established  Follow Up Visit       Date: March 26, 2024  Patient Name: Javier Aaron   MRN: [id number]  Primary physician: [address]  [place] [address]    Interval History:   --Seizure-free.  Daughter helps with medications of the patient and her , meals 3-4/week.  Patient administers the pills to her .  Patient has lost weight, but not related to Zonisamide, but rather related to stress of her 's diagnosis.  The patient feels like she is developing memory issues out of the stress.      --More indifferent to appearance.        OUTPATIENT MEDICATIONS  Current Outpatient Medications on File Prior to Visit   Medication Sig Dispense Refill    ofloxacin 0.3 % Ophthalmic Solution Place 2 drops into the left eye every 4 (four) hours.      levocetirizine 5 MG Oral Tab Take 1 tablet (5 mg total) by mouth every evening. 90 tablet 3    zonisamide 100 MG Oral Cap Take 1 capsule (100 mg total) by mouth in the morning and 1 capsule (100 mg total) before bedtime. 180 capsule 3    desoximetasone 0.05 % External Cream Apply 1 g topically 2 (two) times daily. 60 g 0    escitalopram 20 MG Oral Tab Take 1 tablet (20 mg total) by mouth daily. 90 tablet 3    pravastatin 40 MG Oral Tab Take 1 tablet (40 mg total) by mouth daily. 90 tablet 3    montelukast 10 MG Oral Tab Take 1 tablet (10 mg total) by mouth daily. 90 tablet 3    Calcium ([facility] CALCIUM) 600 MG Oral Tab Take 600 mg by mouth 2 (two) times daily. 180 tablet 3    Trospium Chloride 20 MG Oral Tab Take 1 tablet (20 mg total) by mouth nightly.      Glucosamine-Chondroit-Vit C-Mn (GLUCOSAMINE 1500 COMPLEX OR) Take 2 capsules by mouth daily.      Multiple Vitamins-Minerals (MULTI FOR HER 50+) Oral Tab Take  by mouth.       No current facility-administered medications on file prior to visit.         PHYSICAL EXAM:     Ht 61\"   Wt 112 lb (50.8  kg)   BMI 21.16 kg/m²   General appearance: Well appearing, and in no acute distress  Skin: skin color, texture normal.  No rashes or lesions.    Head: Normocephalic, atraumatic.    Neurological exam:    Mental Status:   Attention/Concentration: intact attention on bedside test   Fund of knowledge: intact  Speech: no dysarthria or aphasia   LABS/DATA:  Component      Latest Ref Rng 8/22/2023 10/2/2023   WBC      4.0 - 11.0 x10(3) uL 4.8     RBC      3.80 - 5.30 x10(6)uL 4.24     Hemoglobin      12.0 - 16.0 g/dL 13.6     Hematocrit      35.0 - 48.0 % 39.5     MCV      80.0 - 100.0 fL 93.2     MCH      26.0 - 34.0 pg 32.1     MCHC      31.0 - 37.0 g/dL 34.4     RDW-SD      35.1 - 46.3 fL 45.3     RDW      11.0 - 15.0 % 13.3     Platelet Count      150.0 - 450.0 10(3)uL 233.0     Prelim Neutrophil Abs      1.50 - 7.70 x10 (3) uL 2.92     Neutrophils Absolute      1.50 - 7.70 x10(3) uL 2.92     Lymphocytes Absolute      1.00 - 4.00 x10(3) uL 0.88 (L)     Monocytes Absolute      0.10 - 1.00 x10(3) uL 0.55     Eosinophils Absolute      0.00 - 0.70 x10(3) uL 0.34     Basophils Absolute      0.00 - 0.20 x10(3) uL 0.05     Immature Granulocyte Absolute      0.00 - 1.00 x10(3) uL 0.01     Neutrophils %      % 61.4     Lymphocytes %      % 18.5     Monocytes %      % 11.6     Eosinophils %      % 7.2     Basophils %      % 1.1     Immature Granulocyte %      % 0.2     Glucose      70 - 99 mg/dL  103 (H)    Sodium      136 - 145 mmol/L  144    Potassium      3.5 - 5.1 mmol/L  3.8    Chloride      98 - 112 mmol/L  113 (H)    Carbon Dioxide, Total      21.0 - 32.0 mmol/L  23.0    ANION GAP      0 - 18 mmol/L  8    BUN      7 - 18 mg/dL  12    CREATININE      0.55 - 1.02 mg/dL  0.74    BUN/CREATININE RATIO      10.0 - 20.0   16.2    CALCIUM      8.5 - 10.1 mg/dL  9.3    CALCULATED OSMOLALITY      275 - 295 mOsm/kg  298 (H)    EGFR      >=60 mL/min/1.73m2  84    ALT (SGPT)      13 - 56 U/L  18    AST (SGOT)      15 - 37 U/L  14  (L)    ALKALINE PHOSPHATASE      55 - 142 U/L  64    Total Bilirubin      0.1 - 2.0 mg/dL  1.0    PROTEIN, TOTAL      6.4 - 8.2 g/dL  6.4    Albumin      3.4 - 5.0 g/dL  3.5    Globulin      2.8 - 4.4 g/dL  2.9    A/G Ratio      1.0 - 2.0   1.2    Patient Fasting for CMP?  Yes       Legend:  (L) Low  (H) High      IMAGING:  N/A    ASSESSMENT:  The patient is a 78 year old   woman with past medical history of ocular migraines, left-sided breast cancer status post lumpectomy lymph node dissection and chemotherapy/radiation 2006 complicated by  chemotherapy-induced peripheral neuropathy, allergic rhinitis and asthma, anxiety who presents for follow up regarding generalized and focal seizures, respectively.  These have responded well to Zonisamide.        Her neurological examination was significant for length dependent peripheral neuropathy.      -CT brain 1/2021: Mild cortical atrophy and chronic microvascular ischemic changes  -MRI brain O 9/12/2021: chronic microvascular ischemic changes   -Negative/normal, monoclonal proteins, B12 and MMA, folic acid, TSH, B1 and B6, vitamin E   --EEG 11/2021 - normal   --CT brain 4/2023 - no acute findings      Focal and generalized epilepsy has not been able to tolerate Keppra or Lamotrigine.  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   --Podiatry yearly evaluation, discussed    Follow up: 3 months     Discussed indication, administration, dose, and side effects with patient of any medications personally prescribed. Patient was advised to let my office know if they have any questions or concerns.       Today, I personally spent 30 minutes in this case, including chart review,  time spent with patient doing face to face evaluation w/ interview and exam and patient education, counseling, and time was spent in patient education, counseling, and coordination of care as described above.   Issues discussed: Diagnosis and implications on future health, benefits and side effects of present and future medications, test results as well as further testing and medications required.    This note was prepared using Dragon Medical voice recognition dictation software and as a result, errors may occur. When identified, these errors have been corrected. While every attempt is made to correct errors during dictation, discrepancies may still exist    JANE Corona DO   Staff Physician, Neurology   3/26/2024  12:52 PM

## 2024-03-28 LAB — VITAMIN B1 WHOLE BLD: 101.7 NMOL/L

## 2024-03-30 LAB — VITAMIN B6: 24.3 UG/L

## 2024-04-01 LAB — METHYLMALONIC ACID: 128 NMOL/L

## 2024-04-05 LAB
VIT E ALPHA TOCO: 12.3 MG/L
VIT E GAMMA TOCO: 0.2 MG/L

## 2024-04-16 ENCOUNTER — PATIENT OUTREACH (OUTPATIENT)
Dept: CASE MANAGEMENT | Age: 79
End: 2024-04-16

## 2024-04-16 DIAGNOSIS — H25.13 AGE-RELATED NUCLEAR CATARACT OF BOTH EYES: ICD-10-CM

## 2024-04-16 DIAGNOSIS — M89.9 DISORDER OF BONE AND CARTILAGE: ICD-10-CM

## 2024-04-16 DIAGNOSIS — G40.309 NONINTRACTABLE GENERALIZED IDIOPATHIC EPILEPSY WITHOUT STATUS EPILEPTICUS (HCC): ICD-10-CM

## 2024-04-16 DIAGNOSIS — I10 PRIMARY HYPERTENSION: Primary | ICD-10-CM

## 2024-04-16 DIAGNOSIS — F32.1 CURRENT MODERATE EPISODE OF MAJOR DEPRESSIVE DISORDER, UNSPECIFIED WHETHER RECURRENT (HCC): ICD-10-CM

## 2024-04-16 DIAGNOSIS — M94.9 DISORDER OF BONE AND CARTILAGE: ICD-10-CM

## 2024-04-16 DIAGNOSIS — F41.1 GAD (GENERALIZED ANXIETY DISORDER): ICD-10-CM

## 2024-04-16 NOTE — PROGRESS NOTES
4/16/2024  Spoke to Javier for [facility].      Updates to patient care team/ comments: Reviewed with patient   Patient reported changes in medications: (removed ofloxacin 0.3 % Ophthalmic Solution )  Med Adherence  Comment: Taking as directed    Health Maintenance: Reviewed with patient.  Health Maintenance   Topic Date Due    Zoster Vaccines (1 of 2) Never done    Annual Physical  09/19/2023    Mammogram  02/19/2025    Influenza Vaccine  Completed    DEXA Scan  Completed    Annual Depression Screening  Completed    Fall Risk Screening (Annual)  Completed    Pneumococcal Vaccine: 65+ Years  Completed    COVID-19 Vaccine  Completed    Colorectal Cancer Screening  Discontinued       Patient current concerns: Spoke with the patient who reports having good and bad days due to her 's health. She recently saw neurologist Dr. Corona on 03/26/24 and discussed her concerns with depression and anxiety, mentioning that she doesn't feel that Lexapro has been effective. Her medication was changed to Zoloft, which she has not noticed improvement with her symptoms. Patient is aware that being on Lexapro for a very long time and most likely will take time for the new medication to take effect. Patient has also continued with therapy, her last session with Klaudia JACOBSON was on 04/09/24, which has been helpful, and her next session is scheduled for next week.     Patient reports following up with ophthalmologist Dr. Soto after her cataract surgery, as her vision has improved to 20/20. Her next follow-up appointment is scheduled for August.    Patient reports being compliant with her medication regimen, she does not currently need refills states the medications are managed by her daughter, who helps sort them out for her. Patient lives with her  in a condo, she has assistance from her children with appointments, medications, and grocery shopping.    Goals/Action Plan:     Active goal from previous outreach: work on in-home  stretches     Patient reported progress towards goals: slowly progressing, continues to work towards goals               - What: work on in-home stretches           - Where/When/How: work on stretches daily  Patient Reported Barriers and Concerns: no barriers reported                   - Plan for overcoming barriers: N/A        Care managers interventions:  Reviewed care team, health maintenance, and updated medication list. Listened to patient's concerns regarding her 's health and provided support. Continued to provide education on how to better manage and cope with chronic conditions. Informed of the importance of reporting any new symptoms to prevent any health complications.      Appointments reviewed with patient.   Future Appointments   Date Time Provider Department Center   4/23/2024 11:00 AM Klaudia Murphy LCSW [facility] [place]   6/27/2024  2:20 PM Caryn Corona DO [facility] [place] [facility]   8/22/2024  3:00 PM Dominic Manzo MD [facility]        Next Care Manager Follow Up Date: One month    Reason For Follow Up: review progress and or barriers towards patient's goals.     Time Spent This Encounter Total: 26 min medical record review, telephone communication, care plan updates where needed, education, goals, and action plan recreation/update. Provided acknowledgment and validation to patient's concerns.   Monthly Minute Total including today: 26 min  Physical assessment, complete health history, and need for CCM established by Da Rocha DO.

## 2024-05-14 RX ORDER — PRAVASTATIN SODIUM 40 MG
40 TABLET ORAL DAILY
Qty: 90 TABLET | Refills: 3 | Status: SHIPPED | OUTPATIENT
Start: 2024-05-14

## 2024-05-14 RX ORDER — MONTELUKAST SODIUM 10 MG/1
10 TABLET ORAL DAILY
Qty: 90 TABLET | Refills: 3 | Status: SHIPPED | OUTPATIENT
Start: 2024-05-14

## 2024-05-14 NOTE — TELEPHONE ENCOUNTER
Please review. Rx failed/no protocol.    Requested Prescriptions   Pending Prescriptions Disp Refills    PRAVASTATIN 40 MG Oral Tab [Pharmacy Med Name: PRAVASTATIN 40MG TABLETS] 90 tablet 3     Sig: TAKE 1 TABLET(40 MG) BY MOUTH DAILY       Cholesterol Medication Protocol Passed - 5/12/2024  7:09 AM        Passed - ALT < 80     Lab Results   Component Value Date    ALT 18 10/02/2023             Passed - ALT resulted within past year        Passed - Lipid panel within past 12 months     Lab Results   Component Value Date    CHOLEST 136 10/02/2023    TRIG 83 10/02/2023    HDL 41 10/02/2023    LDL 79 10/02/2023    VLDL 13 10/02/2023    NONHDLC 95 10/02/2023             Passed - In person appointment or virtual visit in the past 12 mos or appointment in next 3 mos     Recent Outpatient Visits              1 month ago 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 [place]Da King, DO    Office Visit          Future Appointments         Provider Department Appt Notes    In 1 month Caryn Corona DO [facility] [place] [place], [place] 3 mths f/up    In 3 months Dominic Manzo MD 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)    [facility] Caryn Corona, DO    Office Visit    6 months ago Dislocation of temporomandibular joint, initial encounter    [facility] [place] [place] Cespedes, David B, DO    Office Visit    7 months [facility]    7 months ago 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)    [facility] Caryn Corona, DO    Office Visit    8 months ago [facility]ergic reaction, subsequent [facility]          Future Appointments         Provider Department Appt Notes    In 1 month Caryn Corona DO [place], [place] Liz Rivas 3 mths f/up    In 3 months Dominic Manzo MD [facility] Hematology Oncology                     Failed - AAP/ACT given in last 12 months     No data recorded  No data recorded  No data recorded  No data recorded               Future Appointments         Provider Department Appt Notes    In 1 month Caryn Corona DO [place], [place] 3 mths f/up    In 3 months Dominic Manzo MD [facility] Hematology Oncology           Recent Outpatient Visits              1 month ago 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)    [facility] Caryn Corona, DO    Office Visit    6 months ago Dislocation of temporomandibular  joint, initial encounter    [facility], [facility], [place] Cespedes, David B, DO    Office Visit    7 months ago Encounter for screening colonoscopy    [facility], [facility], [place] Cespedes, David B, DO    Office Visit    7 months ago 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

## 2024-05-20 ENCOUNTER — PATIENT OUTREACH (OUTPATIENT)
Dept: CASE MANAGEMENT | Age: 79
End: 2024-05-20

## 2024-05-20 NOTE — PROGRESS NOTES
5/20/2024  Spoke to Javier for [facility].      Updates to patient care team/ comments: UTD  Patient reported changes in medications: None  Med Adherence  Comment: Taking as prescribed    Health Maintenance:  Reviewed with patient.   Health Maintenance   Topic Date Due    Zoster Vaccines (1 of 2) Never done    Annual Physical  09/19/2023    COVID-19 Vaccine (7 - 2023-24 season) 03/10/2024    Mammogram  02/19/2025    Influenza Vaccine  Completed    DEXA Scan  Completed    Annual Depression Screening  Completed    Fall Risk Screening (Annual)  Completed    Pneumococcal Vaccine: 65+ Years  Completed    Colorectal Cancer Screening  Discontinued       Patient current concerns: Spoke to the patient, who voiced no new health concerns and stated she has been feeling as well as she can. Her mother-in-law, who is currently in hospice, celebrated her 97th birthday yesterday.   Patient has continued to receive therapy with KAVON Rudolph, on a monthly basis. Her next appointment is scheduled for tomorrow. Patient states that she is currently taking Sertraline which has been helping her with her depression and anxiety.  Patient reports being compliant with her medication regimen, she does not currently need refills states the medications are managed by her daughter, who helps sort them out for her. Patient lives with her  in a condo, she has assistance from her children with appointments, medications, and grocery shopping.     Goals/Action Plan:     Active goal from previous outreach: work on in-home stretches     Patient reported progress towards goals: slowly progressing, continues to work towards goals               - What: work on in-home stretches           - Where/When/How: work on stretches daily  Patient Reported Barriers and Concerns: no barriers reported                   - Plan for overcoming barriers: N/A        Care managers interventions:  Reviewed care team, health maintenance, and updated medication list. Listened  to patient's concerns regarding her 's health and provided support. Continued to provide education on how to better manage and cope with chronic conditions. Informed of the importance of reporting any new symptoms to prevent any health complications.      Appointments reviewed with patient.   Future Appointments   Date Time Provider Department Center   6/27/2024  2:20 PM Caryn Corona DO [facility] [place] [facility]   8/22/2024  3:00 PM Dominic Manzo MD [facility]        Next Care Manager Follow Up Date: One month    Reason For Follow Up: review progress and or barriers towards patient's goals.     Time Spent This Encounter Total: 15 min medical record review, telephone communication, care plan updates where needed, education, goals, and action plan recreation/update. Provided acknowledgment and validation to patient's concerns.   Monthly Minute Total including today: 15 min  Physical assessment, complete health history, and need for CCM established by Da Rocha DO.

## 2024-06-11 ENCOUNTER — TELEPHONE (OUTPATIENT)
Age: 79
End: 2024-06-11

## 2024-06-19 ENCOUNTER — PATIENT OUTREACH (OUTPATIENT)
Dept: CASE MANAGEMENT | Age: 79
End: 2024-06-19

## 2024-06-19 DIAGNOSIS — F41.1 GAD (GENERALIZED ANXIETY DISORDER): ICD-10-CM

## 2024-06-19 DIAGNOSIS — I10 PRIMARY HYPERTENSION: Primary | ICD-10-CM

## 2024-06-19 DIAGNOSIS — M89.9 DISORDER OF BONE AND CARTILAGE: ICD-10-CM

## 2024-06-19 DIAGNOSIS — M94.9 DISORDER OF BONE AND CARTILAGE: ICD-10-CM

## 2024-06-19 DIAGNOSIS — F32.1 CURRENT MODERATE EPISODE OF MAJOR DEPRESSIVE DISORDER, UNSPECIFIED WHETHER RECURRENT (HCC): ICD-10-CM

## 2024-06-19 DIAGNOSIS — G40.309 NONINTRACTABLE GENERALIZED IDIOPATHIC EPILEPSY WITHOUT STATUS EPILEPTICUS (HCC): ICD-10-CM

## 2024-06-19 NOTE — PROGRESS NOTES
6/19/2024  Spoke to Javier for [facility].      Updates to patient care team/ comments: UTD  Patient reported changes in medications: None  Med Adherence  Comment: Taking as prescribed    Health Maintenance:  Reviewed with patient.   Health Maintenance   Topic Date Due    Zoster Vaccines (1 of 2) Never done    Annual Physical  09/19/2023    COVID-19 Vaccine (7 - 2023-24 season) 03/10/2024    Mammogram  02/19/2025    Influenza Vaccine  Completed    DEXA Scan  Completed    Annual Depression Screening  Completed    Fall Risk Screening (Annual)  Completed    Pneumococcal Vaccine: 65+ Years  Completed    Colorectal Cancer Screening  Discontinued       Patient current concerns: Spoke to the patient, who voiced no new health concerns, stated she had difficulty with scheduling a follow up appointment with Klaudia Murphy LCSW. Patient states she was able to get a hold of the office and schedule a follow up appointment. She has been a bit sad due to the recent passing of her mother-in-law, her visitation services have been scheduled for this weekend.      Patient reports being compliant with her medication regimen, she does not currently need refills states the medications are managed by her daughter, who helps sort them out for her. Patient lives with her  in a condo, she has assistance from her children with appointments, medications, and grocery shopping.     Goals/Action Plan:     Active goal from previous outreach: work on in-home stretches     Patient reported progress towards goals: slowly progressing, continues to work towards goals               - What: work on in-home stretches           - Where/When/How: work on stretches daily  Patient Reported Barriers and Concerns: no barriers reported                   - Plan for overcoming barriers: N/A        Care managers interventions:  Reviewed care team, health maintenance, and medication list. Reminded patient she is overdue for her Medicare physical. Listened to  patient's concerns regarding her 's health and provided support. Continued to provide education on how to better manage and cope with chronic conditions. Informed of the importance of reporting any new symptoms to prevent any health complications.      Appointments reviewed with patient.   Future Appointments   Date Time Provider Department Center   6/27/2024  2:20 PM Caryn Corona DO [facility]   7/9/2024  2:00 PM Klaudia Murphy LCSW [facility] [place]   8/22/2024  3:00 PM Dominic Manzo MD [facility]        Next Care Manager Follow Up Date: One month    Reason For Follow Up: review progress and or barriers towards patient's goals.     Time Spent This Encounter Total: 23 min medical record review, telephone communication, care plan updates where needed, education, goals, and action plan recreation/update. Provided acknowledgment and validation to patient's concerns.   Monthly Minute Total including today: 23 min  Physical assessment, complete health history, and need for CCM established by Da Rocha DO.

## 2024-06-27 ENCOUNTER — OFFICE VISIT (OUTPATIENT)
Dept: NEUROLOGY | Facility: CLINIC | Age: 79
End: 2024-06-27

## 2024-06-27 DIAGNOSIS — R41.89 PSEUDODEMENTIA: ICD-10-CM

## 2024-06-27 DIAGNOSIS — G40.802 EPILEPSY WITH BOTH GENERALIZED AND FOCAL FEATURES (HCC): Primary | ICD-10-CM

## 2024-06-27 DIAGNOSIS — G62.9 LENGTH-DEPENDENT PERIPHERAL NEUROPATHY: ICD-10-CM

## 2024-06-27 PROCEDURE — 99214 OFFICE O/P EST MOD 30 MIN: CPT | Performed by: OTHER

## 2024-06-27 RX ORDER — MEMANTINE HYDROCHLORIDE 5 MG/1
5 TABLET ORAL DAILY
Qty: 90 TABLET | Refills: 0 | Status: SHIPPED | OUTPATIENT
Start: 2024-06-27 | End: 2024-09-25

## 2024-06-27 RX ORDER — ZONISAMIDE 100 MG/1
100 CAPSULE ORAL 2 TIMES DAILY
Qty: 180 CAPSULE | Refills: 3 | Status: SHIPPED | OUTPATIENT
Start: 2024-06-27 | End: 2025-06-22

## 2024-06-27 RX ORDER — SERTRALINE HYDROCHLORIDE 25 MG/1
75 TABLET, FILM COATED ORAL DAILY
Qty: 270 TABLET | Refills: 0 | Status: SHIPPED | OUTPATIENT
Start: 2024-06-27 | End: 2024-06-27

## 2024-06-27 RX ORDER — TRAZODONE HYDROCHLORIDE 50 MG/1
TABLET ORAL
Qty: 94 TABLET | Refills: 0 | Status: SHIPPED | OUTPATIENT
Start: 2024-06-27 | End: 2024-10-02

## 2024-06-27 NOTE — PROGRESS NOTES
[facility] [place]  [address], [address]  [place] [facility] [place] [facility] [address]  [phone]          Established  Follow Up Visit       Date: June 27, 2024  Patient Name: Javier Aaron   MRN: [id number]  Primary physician: [address]  [place] [address]    Interval History:   -- has been formally diagnosed with dementia and that has been very difficult for the patient to manage.  He lost his mother, with whom he had a complicated relationship.      --The patient has been under a large amount of psychosocial stressors.      --She has had 2 episodes in all of this, including today.  She shower today, felt \"woozy\" after, sat down on her walker for a couple of minutes.  Kept telling herself that she was fine.  Sat for a few minutes, she thinks.  Then she called her daughter.   was able to bring her OJ.  No loss of consciousness.      --Yesterday she was very angry about something.      --Around Memorial Day weekend she became overheated and was woozy.      --Insomnia developed after Zoloft was started     OUTPATIENT MEDICATIONS  Current Outpatient Medications on File Prior to Visit   Medication Sig Dispense Refill    pravastatin 40 MG Oral Tab Take 1 tablet (40 mg total) by mouth daily. 90 tablet 3    montelukast 10 MG Oral Tab Take 1 tablet (10 mg total) by mouth daily. 90 tablet 3    sertraline (ZOLOFT) 25 MG Oral Tab Take 1 tablet (25 mg total) by mouth daily for 7 days, THEN 2 tablets (50 mg total) daily. 187 tablet 0    zonisamide 100 MG Oral Cap Take 1 capsule (100 mg total) by mouth in the morning and 1 capsule (100 mg total) before bedtime. 180 capsule 3    levocetirizine 5 MG Oral Tab Take 1 tablet (5 mg total) by mouth every evening. 90 tablet 3    desoximetasone 0.05 % External Cream Apply 1 g topically 2 (two) times daily. 60 g 0    Calcium ([facility] CALCIUM) 600 MG Oral Tab Take 600 mg by mouth 2 (two) times daily. 180 tablet 3    Trospium Chloride 20 MG Oral Tab Take 1 tablet (20  mg total) by mouth nightly.      Glucosamine-Chondroit-Vit C-Mn (GLUCOSAMINE 1500 COMPLEX OR) Take 2 capsules by mouth daily.      Multiple Vitamins-Minerals (MULTI FOR HER 50+) Oral Tab Take  by mouth.       No current facility-administered medications on file prior to visit.         PHYSICAL EXAM:   There were no vitals taken for this visit.  General appearance: Well appearing, and in no acute distress  Skin: skin color, texture normal.  No rashes or lesions.    Head: Normocephalic, atraumatic.    Neurological exam:    Mental Status:   Attention/Concentration: intact attention on bedside test   Fund of knowledge: intact   Speech: no dysarthria or aphasia       LABS/DATA:  See below      IMAGING:  N/A    ASSESSMENT:  The patient is a 78 year old woman with past medical history of ocular migraines, left-sided breast cancer status post lumpectomy lymph node dissection and chemotherapy/radiation 2006 complicated by  chemotherapy-induced peripheral neuropathy, allergic rhinitis and asthma, anxiety who presents for follow up .        Her neurological examination was significant for length dependent peripheral neuropathy.      -CT brain 1/2021: Mild cortical atrophy and chronic microvascular ischemic changes  -MRI brain O 9/12/2021: chronic microvascular ischemic changes   -Negative/normal, monoclonal proteins, B12 and MMA, folic acid, TSH, B1 and B6, vitamin E   --EEG 11/2021 - normal   --CT brain 4/2023 - no acute findings   -Normal/negative vitamin E, ammonia, folic acid, vitamin B12/MMA, vitamin B6 and vitamin B1 levels     Focal and generalized epilepsy has not been able to tolerate Keppra or Lamotrigine.  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   --Podiatry yearly evaluation, discussed    Follow up: 3 months     Discussed indication, administration, dose, and side effects with patient of any medications personally prescribed. Patient was advised to let my office know if they have any questions or concerns.       Today, I personally spent 18 minutes in this case, including chart review, time spent with patient doing face to face evaluation w/ interview and exam and patient education, counseling, and time was spent in patient education, counseling, and coordination of care as described above.   Issues discussed: Diagnosis and implications on future health, benefits and side effects of present and future medications, test results as well as further testing and medications required.    This note was prepared using Dragon Medical voice recognition dictation software and as a result, errors may occur. When identified, these errors have been corrected. While every attempt is made to correct errors during dictation, discrepancies may still exist    JANE Corona DO   Staff Physician, Neurology   6/27/2024  2:30 PM

## 2024-07-05 ENCOUNTER — PATIENT MESSAGE (OUTPATIENT)
Dept: NEUROLOGY | Facility: CLINIC | Age: 79
End: 2024-07-05

## 2024-07-08 NOTE — TELEPHONE ENCOUNTER
From: Javier Aaron  To: Caryn Corona  Sent: 7/5/2024 2:54 PM CDT  Subject: Anxiety    Hi Dr. Corona,    Since my 's decline in health my anxiety has gotten very high. I am in the process of weaning onto Trazodoine 50 mg. However, I’m really struggling with all the uncertainties regarding Alexander’s health, especially since now there is a possibility of him having Parkinson’s. My  sees you on the 18th.     Is there anything in addition to what I’m already taking to help durning this time?    Thank you,    Javier Aaron

## 2024-07-09 RX ORDER — TRAZODONE HYDROCHLORIDE 50 MG/1
TABLET ORAL
Qty: 270 TABLET | Refills: 3 | Status: SHIPPED | OUTPATIENT
Start: 2024-07-09

## 2024-07-16 ENCOUNTER — TELEPHONE (OUTPATIENT)
Age: 79
End: 2024-07-16

## 2024-07-16 NOTE — TELEPHONE ENCOUNTER
Hello,     The [facility] team has attempted to reach you regarding an order from Dr. Rocha's office. We are reaching out in order to assist you in coordinating care and resources that may meet your needs. Please give our office a call at [phone]. For more immediate assistance you can contact our 24-hour help line at [phone]. We look forward to hearing from you soon.

## 2024-07-16 NOTE — TELEPHONE ENCOUNTER
Hello,  Thank you for taking the time to connect with me today. As a reminder, I am reaching out from the [facility] Navigation department, following up on an order from your provider's office to assist in connecting you with psychiatry resources for care. If this is something that you would like to move forward with at any time, please give us a call back at [phone], or for more immediate assistance you can contact our 24-hour help line at [phone].

## 2024-07-22 ENCOUNTER — NURSE TRIAGE (OUTPATIENT)
Dept: FAMILY MEDICINE CLINIC | Facility: CLINIC | Age: 79
End: 2024-07-22

## 2024-07-22 NOTE — TELEPHONE ENCOUNTER
I called Rebecca RN with [facility] --> Patient contacted and made aware of Zion Duarte PA-C's recommendation. She verbalized understanding. No further questions or concerns at this time.       negative

## 2024-07-22 NOTE — TELEPHONE ENCOUNTER
Please reply to pool: EM RN TRIAGE  Action Requested: Summary for Provider     []  Critical Lab, Recommendations Needed  [x] Need Additional Advice  []   FYI    []   Need Orders  [] Need Medications Sent to Pharmacy  []  Other     SUMMARY: Received call from [facility] reporting heart rate of 104.  110/80, 97.7, 18, 99%.  Patient feels well, denies chest pain or shortness of breath, dizziness or lightheadedness.  Feels that her rhythm is regular.  Reports mild cold symptoms. Nasal congestion and mild cough, non productive.  Nurse advised to monitor symptoms, report to emergency room for any chest pain, breathing difficulty or palpitations.  She verbalized understanding and compliance.  Routed to covering provider.     Reason for call: Tachycardia  Onset: Data Unavailable

## 2024-07-22 NOTE — TELEPHONE ENCOUNTER
[facility]COLM [facility] indicated that forgot to report earlier that patient's memory seems to be declining a lot more. Taking memantine 5mg daily prescribed by Dr Corona- neurologist. Can't tell you the year. Forgetting conversations that just had. Stated that she was going to eat an Italian Beef she had left over from yesterday, but daughter stated that there was no Italian Beef they went to a Mexican restaurant.

## 2024-07-23 ENCOUNTER — TELEPHONE (OUTPATIENT)
Dept: NEUROLOGY | Facility: CLINIC | Age: 79
End: 2024-07-23

## 2024-07-23 NOTE — TELEPHONE ENCOUNTER
Phone call placed to pt daughter, Niecy, verified through verbal release. RN received message from patient PCP on 07/22/24, regarding a change in the patient cognition. RN advised that the patient should be seen and recommended times for today, Niecy is not able to bring the patient today. Niecy advised this RN that the Patient started on Memantine 5 mg oral tab yesterday 07/22/24, so this medication has not been in her system for very long. Niecy advised this RN that per PCP referral, patient has psychiatry appointment 07/31/24 @ 3:00 pm with Paige Ramírez DNP PMHNP. MALCOLM and Niecy will contact the psychiatry practitioner to inquire if they do neurological testing.

## 2024-07-25 ENCOUNTER — HOSPITAL ENCOUNTER (OUTPATIENT)
Age: 79
Discharge: HOME | End: 2024-07-25
Payer: MEDICARE

## 2024-07-25 ENCOUNTER — PATIENT OUTREACH (OUTPATIENT)
Dept: CASE MANAGEMENT | Age: 79
End: 2024-07-25

## 2024-07-25 ENCOUNTER — APPOINTMENT (OUTPATIENT)
Dept: ULTRASOUND IMAGING | Age: 79
End: 2024-07-25
Attending: NURSE PRACTITIONER
Payer: MEDICARE

## 2024-07-25 VITALS
WEIGHT: 147 LBS | DIASTOLIC BLOOD PRESSURE: 81 MMHG | SYSTOLIC BLOOD PRESSURE: 147 MMHG | BODY MASS INDEX: 28.86 KG/M2 | RESPIRATION RATE: 18 BRPM | OXYGEN SATURATION: 99 % | TEMPERATURE: 98 F | HEIGHT: 60 IN | HEART RATE: 99 BPM

## 2024-07-25 DIAGNOSIS — F32.1 CURRENT MODERATE EPISODE OF MAJOR DEPRESSIVE DISORDER, UNSPECIFIED WHETHER RECURRENT (HCC): ICD-10-CM

## 2024-07-25 DIAGNOSIS — M89.9 DISORDER OF BONE AND CARTILAGE: ICD-10-CM

## 2024-07-25 DIAGNOSIS — G40.309 NONINTRACTABLE GENERALIZED IDIOPATHIC EPILEPSY WITHOUT STATUS EPILEPTICUS (HCC): ICD-10-CM

## 2024-07-25 DIAGNOSIS — F41.1 GAD (GENERALIZED ANXIETY DISORDER): ICD-10-CM

## 2024-07-25 DIAGNOSIS — H25.13 AGE-RELATED NUCLEAR CATARACT OF BOTH EYES: ICD-10-CM

## 2024-07-25 DIAGNOSIS — M94.9 DISORDER OF BONE AND CARTILAGE: ICD-10-CM

## 2024-07-25 DIAGNOSIS — I10 PRIMARY HYPERTENSION: Primary | ICD-10-CM

## 2024-07-25 DIAGNOSIS — M79.89 LEG SWELLING: Primary | ICD-10-CM

## 2024-07-25 PROCEDURE — 93971 EXTREMITY STUDY: CPT | Performed by: NURSE PRACTITIONER

## 2024-07-25 PROCEDURE — 99214 OFFICE O/P EST MOD 30 MIN: CPT

## 2024-07-25 NOTE — ED PROVIDER NOTES
Patient Seen in: [facility] [place]      History     Chief Complaint   Patient presents with    Swelling Edema     Stated Complaint: left foot swelling    Subjective:   HPI    78-year-old female presents to immediate care with complaints of left leg and foot swelling that started 1 day ago.  Patient does have a history of DVTs.  She denies chest pain or feeling short of breath.  There has been no injury to that leg.    Objective:   Past Medical History:    Asthma (HCC)    asthma as a child    Breast CA (HCC)    Breast cancer, left (HCC)    PER nh: LUMPECTOMY 04/2006'; LYMPH NODE LEFT; LT LYMPH NODE DISSECTION, 6 out of 10    Cancer (HCC)    Cataract    Encounter for chemotherapy management    per NG: chemo for breast cancer    Extrinsic asthma, unspecified    Hearing impairment    bilateral hearing aids    Hemorrhoids    History of Papanicolaou smear of cervix    DONE [facility] NG    Radiation    per NG: Lt breast radiation /Dr Champagne    Seizure disorder (HCC)    1 time event- taking meds for prevention              Past Surgical History:   Procedure Laterality Date    Breast lumpectomy  04/2006    PER NG: BREAST CANCER LEFT SIDE SEES ONCOLOGY AT [facility] FOR ANNUAL MAMMO    Breast surgery procedure unlisted  04/2006    per NG: Lt lymph node disection. 6 out of 10 affected YEARLY MAMMOGRAMS AT [facility]    Breast surgery procedure unlisted  2011    per NG:right breast calcification removal    Cataract Right 2022    Chemotherapy      Colonoscopy  2010    Hip replacement surgery      AND REVISION 2016    Lumpectomy left      Radiation left      Surgery - external  12/2017    LEFT FEMUR CANCER HARDWARE/MARLI PLACED                 Social History     Socioeconomic History    Marital status:    Tobacco Use    Smoking status: Former     Types: Cigarettes    Smokeless tobacco: Never    Tobacco comments:     22 years ago    Vaping Use    Vaping status: Never Used   Substance and Sexual Activity    Alcohol use: No    Drug use:  Never   Other Topics Concern    Caffeine Concern Yes     Comment: per NG: chocolate; tea, 2 cups daily     Social Determinants of Health     Financial Resource Strain: Low Risk  (6/19/2024)    Financial Resource Strain     Difficulty of Paying Living Expenses: Not hard at all     Med Affordability: No   Food Insecurity: No Food Insecurity (1/17/2024)    Food Insecurity     Food Insecurity: Never true   Transportation Needs: No Transportation Needs (1/17/2024)    Transportation Needs     Lack of Transportation: No   Physical Activity: Inactive (1/17/2024)    Exercise Vital Sign     Days of Exercise per Week: 0 days     Minutes of Exercise per Session: 0 min   Stress: No Stress Concern Present (1/17/2024)    Stress     Feeling of Stress : No   Social Connections: Socially Integrated (1/17/2024)    Social Connections     Frequency of Socialization with Friends and Family: 3   Housing Stability: Low Risk  (1/17/2024)    Housing Stability     Housing Instability: No              Review of Systems    Positive for stated Chief Complaint: Swelling Edema    Other systems are as noted in HPI.  Constitutional and vital signs reviewed.      All other systems reviewed and negative except as noted above.    Physical Exam     ED Triage Vitals [07/25/24 1716]   /81   Pulse 99   Resp 18   Temp 98.4 °F (36.9 °C)   Temp src Temporal   SpO2 99 %   O2 Device None (Room air)       Current Vitals:   Vital Signs  BP: 147/81  Pulse: 99  Resp: 18  Temp: 98.4 °F (36.9 °C)  Temp src: Temporal    Oxygen Therapy  SpO2: 99 %  O2 Device: None (Room air)            Physical Exam  Vitals reviewed.   Constitutional:       General: She is not in acute distress.  HENT:      Mouth/Throat:      Mouth: Mucous membranes are moist.   Cardiovascular:      Rate and Rhythm: Normal rate and regular rhythm.   Pulmonary:      Effort: Pulmonary effort is normal.      Breath sounds: Normal breath sounds.   Musculoskeletal:         General: Swelling and  tenderness present. No deformity or signs of injury.      Left lower leg: Edema present.        Legs:       Comments: + L lower leg swelling.  There is neg tenderness to palpate L calf but foot is tender   Pulses are intact.     Skin:     General: Skin is warm and dry.   Neurological:      General: No focal deficit present.      Mental Status: She is alert and oriented to person, place, and time.   Psychiatric:         Mood and Affect: Mood normal.         Behavior: Behavior normal.               ED Course   Labs Reviewed - No data to display       US VENOUS DOPPLER LEG LEFT - DIAG IMG (CPT=93971)          PROCEDURE: US VENOUS DOPPLER LEG LEFT-DIAG IMG (CPT=93971)     COMPARISON: None.     INDICATIONS: Left foot swelling     TECHNIQUE: Color duplex Doppler venous ultrasound of the left lower extremity was performed in the usual manner.     FINDINGS: The femoral and popliteal veins appear normal.  Normal flow was demonstrated with color and pulsed Doppler.  Visualized portions of the great and small saphenous, posterior tibial, and peroneal veins appear normal.       THROMBI: None visible.  COMPRESSIBILITY: Normal.  OTHER: Negative.              =====  CONCLUSION: No evidence of acute deep venous thrombosis in the imaged veins of the left lower extremity.           Dictated by (CST): Madi Francis MD on 7/25/2024 at 6:54 PM       Finalized by (CST): Madi Francis MD on 7/25/2024 at 6:55 PM                               [facility]                                         Medical Decision Making  78-year-old female with left lower leg swelling x 1 day.  Differential diagnosis includes DVT, Baker's cyst, peripheral vascular disease, cellulitis.  On exam there is positive swelling of leg from knee to include ankle and foot.  There is some tenderness to palpate the foot.  Distal pulses are intact.  Left lower leg ultrasound was ordered and shows no DVT.  The cause of her leg swelling is unclear.  Results of the ultrasound were  discussed with patient.  She was instructed to follow-up with her primary care doctor.  She is to call tomorrow to make an appointment.  If she develops other symptoms including chest pain or shortness of breath she is to go to the emergency department.  She was given information and some instructions about leg edema.    Amount and/or Complexity of Data Reviewed  Radiology: ordered.     Details: L lower leg US reviewed by IC provider.  Shows no DVT.    Risk  OTC drugs.        Disposition and Plan     Clinical Impression:  1. Leg swelling         Disposition:  Discharge  7/25/2024  7:10 pm    Follow-up:  Da Rocha, DO  [address] [place] [address]  [phone]    Schedule an appointment as soon as possible for a visit             Medications Prescribed:  Discharge Medication List as of 7/25/2024  7:11 PM

## 2024-07-25 NOTE — PROGRESS NOTES
7/25/2024  Spoke to Javier for [facility].      Updates to patient care team/ comments: UTD  Patient reported changes in medications: None  Med Adherence  Comment: Taking as prescribed, medications are managed by daughter    Health Maintenance:  Reviewed with patient.  Health Maintenance   Topic Date Due    Zoster Vaccines (1 of 2) Never done    Annual Physical  09/19/2023    COVID-19 Vaccine (7 - 2023-24 season) 03/10/2024    Influenza Vaccine (1) 10/01/2024    Mammogram  02/19/2025    DEXA Scan  Completed    Annual Depression Screening  Completed    Fall Risk Screening (Annual)  Completed    Pneumococcal Vaccine: 65+ Years  Completed    Colorectal Cancer Screening  Discontinued       Patient current concerns: Spoke to the patient, who mentioned waking up with swollen toes of the left foot. Patient denies redness or pain, she has discussed this with her daughter who reached out to the home health RN requesting an in-home visit to check on the foot. Patient does not recall injuring her foot.    Patient continues to feel stress at times due to her 's health. Patient discussed this with neurologist who changed her medication, she has started and has been feeling a bit better. Patient also discussed memory issues and was prescribed Memantine, she is not sure if she has started the medication as her daughter is the one who manages the medications.  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 Patient denies recent falls, she does have gait issues which she uses a walker to ambulate. Patient lives with her  in a condo, she has assistance from her children with appointments, medications, and grocery  shopping.     Goals/Action Plan:     Active goal from previous outreach: work on in-home stretches     Patient reported progress towards goals: slowly progressing, continues to work towards goals               - What: work on in-home stretches           - Where/When/How: work on stretches daily  Patient Reported Barriers and Concerns: no barriers reported                   - Plan for overcoming barriers: N/A        Care managers interventions:  Reviewed care team, health maintenance, and medication list. Reminded patient she is overdue for her Medicare physical. Listened to patient's concerns regarding her 's health and provided support. Continued to provide education on how to better manage and cope with chronic conditions. Informed of the importance of reporting any new symptoms to prevent any health complications.     Appointments reviewed with patient.   Future Appointments   Date Time Provider Department Center   7/29/2024  1:00 PM Klaudia Murphy LCSW [facility]   8/22/2024  3:00 PM Dominic Manzo MD [facility]   8/27/2024  2:00 PM Caryn Corona DO [facility]   10/3/2024  1:40 PM Caryn Corona DO [facility]   10/7/2024  3:00 PM Da Rocah DO [facility] [place] [facility] Care Manager Follow Up Date: One month    Reason For Follow Up: review progress and or barriers towards patient's goals.     Time Spent This Encounter Total: 20 min medical record review, telephone communication, care plan updates where needed, education, goals, and action plan recreation/update. Provided acknowledgment and validation to patient's concerns.   Monthly Minute Total including today: 20 min  Physical assessment, complete health history, and need for CCM established by Da Rocha DO.

## 2024-07-29 ENCOUNTER — TELEPHONE (OUTPATIENT)
Dept: FAMILY MEDICINE CLINIC | Facility: CLINIC | Age: 79
End: 2024-07-29

## 2024-08-02 ENCOUNTER — OFFICE VISIT (OUTPATIENT)
Dept: FAMILY MEDICINE CLINIC | Facility: CLINIC | Age: 79
End: 2024-08-02
Payer: MEDICARE

## 2024-08-02 VITALS
HEART RATE: 93 BPM | SYSTOLIC BLOOD PRESSURE: 129 MMHG | DIASTOLIC BLOOD PRESSURE: 70 MMHG | BODY MASS INDEX: 20.01 KG/M2 | WEIGHT: 106 LBS | HEIGHT: 61 IN

## 2024-08-02 DIAGNOSIS — R60.0 EDEMA OF LEFT FOOT: Primary | ICD-10-CM

## 2024-08-02 PROCEDURE — 99213 OFFICE O/P EST LOW 20 MIN: CPT | Performed by: FAMILY MEDICINE

## 2024-08-02 RX ORDER — FLUTICASONE PROPIONATE 50 MCG
2 SPRAY, SUSPENSION (ML) NASAL DAILY
Qty: 1 EACH | Refills: 1 | Status: SHIPPED | OUTPATIENT
Start: 2024-08-02 | End: 2024-11-30

## 2024-08-02 NOTE — PROGRESS NOTES
Blood pressure 129/70, pulse 93, height 5' 1\" (1.549 m), weight 106 lb (48.1 kg).      Patient presents today with concern of left foot swelling was seen at immediate care ultrasound negative for DVT.  No pain.  Has had a history of a left total hip arthroplasty and a left intramedullary ame placed    Also with nasal congestion no relief with over-the-counter antihistamines.    Objective    Left foot with swelling noted no pain good pulses and capillary refill    Left leg negative Homans test    Assessment left foot swelling likely from venous insufficiency also allergic rhinitis    Plan reassurance regarding the foot swelling and Flonase prescription for nasal congestion

## 2024-08-06 ENCOUNTER — TELEPHONE (OUTPATIENT)
Dept: FAMILY MEDICINE CLINIC | Facility: CLINIC | Age: 79
End: 2024-08-06

## 2024-08-12 NOTE — TELEPHONE ENCOUNTER
[facility] received, signed by provider and faxed back successfully.    Sent to scanning.

## 2024-08-19 ENCOUNTER — TELEPHONE (OUTPATIENT)
Dept: FAMILY MEDICINE CLINIC | Facility: CLINIC | Age: 79
End: 2024-08-19

## 2024-08-19 NOTE — TELEPHONE ENCOUNTER
Rebecca, RN - [facility] called, verified patient's Name and . She is calling to request a verbal order for patient's re-certification for home health.    Dr. Rocha please advise.

## 2024-08-19 NOTE — TELEPHONE ENCOUNTER
Patient's name and date of birth provided, informed Rebecca (RN [facility] ) regarding the approved order .

## 2024-08-22 ENCOUNTER — OFFICE VISIT (OUTPATIENT)
Dept: HEMATOLOGY/ONCOLOGY | Facility: HOSPITAL | Age: 79
End: 2024-08-22
Attending: INTERNAL MEDICINE
Payer: MEDICARE

## 2024-08-22 VITALS
HEART RATE: 90 BPM | OXYGEN SATURATION: 99 % | RESPIRATION RATE: 18 BRPM | HEIGHT: 61 IN | SYSTOLIC BLOOD PRESSURE: 135 MMHG | BODY MASS INDEX: 19.73 KG/M2 | WEIGHT: 104.5 LBS | TEMPERATURE: 98 F | DIASTOLIC BLOOD PRESSURE: 87 MMHG

## 2024-08-22 DIAGNOSIS — Z08 ENCOUNTER FOR FOLLOW-UP SURVEILLANCE OF BREAST CANCER: ICD-10-CM

## 2024-08-22 DIAGNOSIS — Z12.31 SCREENING MAMMOGRAM, ENCOUNTER FOR: ICD-10-CM

## 2024-08-22 DIAGNOSIS — Z85.3 ENCOUNTER FOR FOLLOW-UP SURVEILLANCE OF BREAST CANCER: ICD-10-CM

## 2024-08-22 DIAGNOSIS — Z85.3 HISTORY OF LEFT BREAST CANCER: Primary | ICD-10-CM

## 2024-08-22 PROCEDURE — 99213 OFFICE O/P EST LOW 20 MIN: CPT | Performed by: INTERNAL MEDICINE

## 2024-08-22 RX ORDER — MIRTAZAPINE 7.5 MG/1
7.5 TABLET, FILM COATED ORAL NIGHTLY
COMMUNITY

## 2024-08-22 NOTE — PROGRESS NOTES
HPI   Javier Aaron is a 78 year old female here for follow up of   Encounter Diagnoses   Name Primary?    History of left breast cancer Yes    Encounter for follow-up surveillance of breast cancer     Screening mammogram, encounter for      She feels overwhelmed as her  was just diagnosed with Alzheimer's disease.    Has lost 10 lbs since last October due to stress.  Appetite not as good.      Patient has swelling of the left ankle and had a Doppler of the left lower extremity which was negative for DVT.    She denies changes on SBE.      Review of Systems:   Review of Systems   Constitutional:  Positive for appetite change and unexpected weight change. Negative for fatigue.   HENT:           S   Respiratory:  Positive for cough (states from allergies.). Negative for shortness of breath.    Cardiovascular:  Negative for chest pain and palpitations.   Gastrointestinal:  Negative for abdominal pain.   Genitourinary:  Negative for dysuria and frequency.    Musculoskeletal:  Positive for gait problem (uses walker). Negative for arthralgias, back pain and myalgias.   Neurological:  Positive for gait problem (uses walker) and seizures (being managed by neurology.). Negative for dizziness and headaches.        No falls since last visit.   Hematological:  Does not bruise/bleed easily.   Psychiatric/Behavioral:  Positive for sleep disturbance.            Current Outpatient Medications   Medication Sig Dispense Refill    mirtazapine 7.5 MG Oral Tab Take 1 tablet (7.5 mg total) by mouth nightly.      fluticasone propionate 50 MCG/ACT Nasal Suspension 2 sprays by Nasal route daily. Squeeze nose after sprays and do not snort it into the back of your throat. 1 each 1    memantine 5 MG Oral Tab Take 1 tablet (5 mg total) by mouth daily. 90 tablet 0    zonisamide 100 MG Oral Cap Take 1 capsule (100 mg total) by mouth in the morning and 1 capsule (100 mg total) before bedtime. 180 capsule 3    pravastatin 40 MG Oral Tab  Take 1 tablet (40 mg total) by mouth daily. 90 tablet 3    montelukast 10 MG Oral Tab Take 1 tablet (10 mg total) by mouth daily. 90 tablet 3    Calcium ([employer] CALCIUM) 600 MG Oral Tab Take 600 mg by mouth 2 (two) times daily. 180 tablet 3    Trospium Chloride 20 MG Oral Tab Take 1 tablet (20 mg total) by mouth nightly.      Glucosamine-Chondroit-Vit C-Mn (GLUCOSAMINE 1500 COMPLEX OR) Take 2 capsules by mouth daily.      Multiple Vitamins-Minerals (MULTI FOR HER 50+) Oral Tab Take  by mouth.      traZODone 50 MG Oral Tab Take 1/2 tablet in the morning and 1 tablet at night (Patient not taking: Reported on 8/22/2024) 270 tablet 3     Allergies:   Allergies   Allergen Reactions    Prednisone ANXIETY and OTHER (SEE COMMENTS)     Jittery    Clindamycin      Other reaction(s): CLINDAMYCIN    Penicillins UNKNOWN       Past Medical History:    Asthma (HCC)    asthma as a child    Breast CA (HCC)    Breast cancer, left (HCC)    PER nh: LUMPECTOMY 04/2006'; LYMPH NODE LEFT; LT LYMPH NODE DISSECTION, 6 out of 10    Cancer (HCC)    Cataract    Encounter for chemotherapy management    per NG: chemo for breast cancer    Extrinsic asthma, unspecified    Hearing impairment    bilateral hearing aids    Hemorrhoids    History of Papanicolaou smear of cervix    DONE DE NG    Radiation    per NG: Lt breast radiation /Dr Champagne    Seizure disorder (HCC)    1 time event- taking meds for prevention     Past Surgical History:   Procedure Laterality Date    Breast lumpectomy  04/2006    PER NG: BREAST CANCER LEFT SIDE SEES ONCOLOGY AT [facility] FOR ANNUAL MAMMO    Breast surgery procedure unlisted  04/2006    per NG: Lt lymph node disection. 6 out of 10 affected YEARLY MAMMOGRAMS AT [facility]    Breast surgery procedure unlisted  2011    per NG:right breast calcification removal    Cataract Right 2022    Chemotherapy      Colonoscopy  2010    Hip replacement surgery      AND REVISION 2016    Lumpectomy left      Radiation left      Surgery - external   12/2017    LEFT FEMUR CANCER HARDWARE/MARLI PLACED      Social History     Socioeconomic History    Marital status:    Tobacco Use    Smoking status: Former     Types: Cigarettes    Smokeless tobacco: Never    Tobacco comments:     22 years ago    Vaping Use    Vaping status: Never Used   Substance and Sexual Activity    Alcohol use: No    Drug use: Never   Other Topics Concern    Caffeine Concern Yes     Comment: per NG: chocolate; tea, 2 cups daily     Social Determinants of Health     Financial Resource Strain: Low Risk  (6/19/2024)    Financial Resource Strain     Difficulty of Paying Living Expenses: Not hard at all     Med Affordability: No   Food Insecurity: No Food Insecurity (1/17/2024)    Food Insecurity     Food Insecurity: Never true   Transportation Needs: No Transportation Needs (1/17/2024)    Transportation Needs     Lack of Transportation: No   Physical Activity: Inactive (1/17/2024)    Exercise Vital Sign     Days of Exercise per Week: 0 days     Minutes of Exercise per Session: 0 min   Stress: No Stress Concern Present (1/17/2024)    Stress     Feeling of Stress : No   Social Connections: Socially Integrated (1/17/2024)    Social Connections     Frequency of Socialization with Friends and Family: 3   Housing Stability: Low Risk  (1/17/2024)    Housing Stability     Housing Instability: No         Family History   Problem Relation Age of Onset    Breast Cancer Self 69    Diabetes Mother     Endocrine Disorder Mother 62        per NG: pancreatitis    Hypertension Mother     Polyps Daughter         per NG: colon polyps    Diabetes Maternal Aunt         per NG: Type 2    Diabetes Maternal Uncle         per NG: Type 2    Breast Cancer Cousin     Glaucoma Neg          PHYSICAL EXAM:    /87 (BP Location: Right arm, Patient Position: Sitting, Cuff Size: adult)   Pulse 90   Temp 97.8 °F (36.6 °C) (Oral)   Resp 18   Ht 1.549 m (5' 1\")   Wt 47.4 kg (104 lb 8 oz)   SpO2 99%   BMI 19.75 kg/m²    Wt Readings from Last 6 Encounters:   08/22/24 47.4 kg (104 lb 8 oz)   08/02/24 48.1 kg (106 lb)   07/25/24 66.7 kg (147 lb)   03/26/24 50.8 kg (112 lb)   03/01/24 50.8 kg (112 lb)   10/23/23 51.7 kg (114 lb)     Physical Exam  General: Patient is alert, not in acute distress.  HEENT: EOMs intact. PERRL.    Neck: No JVD. No palpable lymphadenopathy. Neck is supple.  Chest: Clear to auscultation.  Breasts:  R breast no masses.  L breast with lumpectomy scar and RT changes, no masses.   Heart: Regular rate and rhythm.   Abdomen: Soft, non tender with good bowel sounds.  Extremities: No LE edema.  Varicose veins. .  Neurological: Grossly intact.   Lymphatics: There is no palpable lymphadenopathy throughout in the cervical, supraclavicular, axillary, or inguinal regions.  Psych/Depression: nl        ASSESSMENT/PLAN:     1. History of left breast cancer    2. Encounter for follow-up surveillance of breast cancer    3. Screening mammogram, encounter for        H/o breast cancer with left lumpectomy in year 2006 treated with radiation therapy.  She had a benign surgical biopsy of the retroareolar right breast for an intraductal papilloma.      On 2/19/2024 BI-RADS 2, she will be due for follow-up imaging in February 2025.      Return in about 1 year (around 8/22/2025) for surveillance follow up with imaging.    MDM low risk    No orders of the defined types were placed in this encounter.      Results From Past 48 Hours:  No results found for this or any previous visit (from the past 48 hour(s)).        Imaging & Referrals:  [facility] RANDY 2D+3D SCREENING BILAT (CPT=77067/75232)   No orders of the defined types were placed in this encounter.       PROCEDURE: [facility] RANDY 2D+3D SCREENING BILAT (78522/[id number])      COMPARISON: External Exams, [facility] SCREENING BILAT (CPT=77067), 7/07/2021, 1:56 PM.  External Exams, BREAST MAMMO DIAGNOSTIC RANDY 2D/3D-BI-ICN, 5/21/2020, 12:58 PM.  [facility], [facility] DIGITAL  SCREEN W CAD, 3/06/2006, 9:26   AM.  External Exams, [facility] SCREENING BILAT (CPT=77067), 7/14/2022, 9:02 AM.      INDICATIONS: Z12.31 Screening mammogram, encounter for.  Patient with history of left breast malignancy status post treatment in 2016.  Patient also has history of right surgical excision.  Per technologist note, patient reports a 20 pound weight loss   since last mammogram      TECHNIQUE: Full field direct screening mammography was performed and images were reviewed with the [employer] PAUL 1.5.1.5 CAD device.  3D tomosynthesis was performed and reviewed         BREAST COMPOSITION:   Category b-Scattered areas fibroglandular density.         FINDINGS: There has been interval increase in breast parenchymal density bilaterally, likely due to patient's weight loss.  Stable postsurgical changes are seen bilaterally.  There is redemonstration of a retained fragment of a localization wire in the   lateral left breast. The parenchyma pattern is otherwise stable with no other new suspicious asymmetry, mass, architectural distortion, or microcalcifications identified in either breast.          CONCLUSION:     1. Benign findings.  Interval increase in breast parenchymal density bilaterally likely due to patient's weight loss.  No mammographic evidence for breast malignancy.  As long as the patient's clinical breast exam remains unchanged, annual screening   mammogram is recommended.      2. Redemonstration of a retained fragment of a localization wire is seen in the lateral left breast.  Clinical management is recommended.       BI-RADS CATEGORY:     DIAGNOSTIC CATEGORY 2--BENIGN FINDING:       RECOMMENDATIONS:   ROUTINE MAMMOGRAM AND CLINICAL EVALUATION IN 12 MONTHS.                   PLEASE NOTE: NORMAL MAMMOGRAM DOES NOT EXCLUDE THE POSSIBILITY OF BREAST CANCER.  A CLINICALLY SUSPICIOUS PALPABLE LUMP SHOULD BE BIOPSIED.       For patients over the age of 40, the target due date for the patient's next mammogram has been  entered into a reminder system.       Patient received a discharge summary from the technologist after completion of exam.      Breast marker legend used on images    Triangle = Palpable lump   [place] = Skin tag or mole   BB = Nipple   Linear jose = Scar   Square = Pain         Finalized by (CST): Rosario Mosquera MD on 2/22/2024 at 7:19 AM     PROCEDURE: [facility] RANDY 2D+3D SCREENING BILAT ([id number])      COMPARISON: External Exams, [facility] SCREENING BILAT (CPT=77067), 7/07/2021, 1:56 PM.  External Exams, BREAST MAMMO DIAGNOSTIC RANDY 2D/3D-BI-ICN, 5/21/2020, 12:58 PM.  [facility] [place] [facility], [facility] PF DIGITAL SCREEN W CAD, 3/06/2006, 9:26   AM.  External Exams, [facility] SCREENING BILAT (CPT=77067), 7/14/2022, 9:02 AM.      INDICATIONS: Z12.31 Screening mammogram, encounter for.  Patient with history of left breast malignancy status post treatment in 2016.  Patient also has history of right surgical excision.  Per technologist note, patient reports a 20 pound weight loss   since last mammogram      TECHNIQUE: Full field direct screening mammography was performed and images were reviewed with the [employer] PAUL 1.5.1.5 CAD device.  3D tomosynthesis was performed and reviewed         BREAST COMPOSITION:   Category b-Scattered areas fibroglandular density.         FINDINGS: There has been interval increase in breast parenchymal density bilaterally, likely due to patient's weight loss.  Stable postsurgical changes are seen bilaterally.  There is redemonstration of a retained fragment of a localization wire in the   lateral left breast. The parenchyma pattern is otherwise stable with no other new suspicious asymmetry, mass, architectural distortion, or microcalcifications identified in either breast.          CONCLUSION:     1. Benign findings.  Interval increase in breast parenchymal density bilaterally likely due to patient's weight loss.  No mammographic evidence for breast malignancy.  As long as the patient's  clinical breast exam remains unchanged, annual screening   mammogram is recommended.      2. Redemonstration of a retained fragment of a localization wire is seen in the lateral left breast.  Clinical management is recommended.       BI-RADS CATEGORY:     DIAGNOSTIC CATEGORY 2--BENIGN FINDING:       RECOMMENDATIONS:   ROUTINE MAMMOGRAM AND CLINICAL EVALUATION IN 12 MONTHS.                   PLEASE NOTE: NORMAL MAMMOGRAM DOES NOT EXCLUDE THE POSSIBILITY OF BREAST CANCER.  A CLINICALLY SUSPICIOUS PALPABLE LUMP SHOULD BE BIOPSIED.       For patients over the age of 40, the target due date for the patient's next mammogram has been entered into a reminder system.       Patient received a discharge summary from the technologist after completion of exam.      Breast marker legend used on images    Triangle = Palpable lump   [place] = Skin tag or mole   BB = Nipple   Linear jose = Scar   Square = Pain         Finalized by (CST): Rosario Mosquera MD on 2/22/2024 at 7:19 AM     PROCEDURE: [facility] RANDY 2D+3D SCREENING BILAT ([id number])      COMPARISON: External Exams, [facility] SCREENING BILAT (CPT=77067), 7/07/2021, 1:56 PM.  External Exams, BREAST MAMMO DIAGNOSTIC RANDY 2D/3D-BI-ICN, 5/21/2020, 12:58 PM.  [facility], [facility] PF DIGITAL SCREEN W CAD, 3/06/2006, 9:26   AM.  External Exams, [facility] SCREENING BILAT (CPT=77067), 7/14/2022, 9:02 AM.      INDICATIONS: Z12.31 Screening mammogram, encounter for.  Patient with history of left breast malignancy status post treatment in 2016.  Patient also has history of right surgical excision.  Per technologist note, patient reports a 20 pound weight loss   since last mammogram      TECHNIQUE: Full field direct screening mammography was performed and images were reviewed with the [employer] PAUL 1.5.1.5 CAD device.  3D tomosynthesis was performed and reviewed         BREAST COMPOSITION:   Category b-Scattered areas fibroglandular density.         FINDINGS: There has been  interval increase in breast parenchymal density bilaterally, likely due to patient's weight loss.  Stable postsurgical changes are seen bilaterally.  There is redemonstration of a retained fragment of a localization wire in the   lateral left breast. The parenchyma pattern is otherwise stable with no other new suspicious asymmetry, mass, architectural distortion, or microcalcifications identified in either breast.          CONCLUSION:     1. Benign findings.  Interval increase in breast parenchymal density bilaterally likely due to patient's weight loss.  No mammographic evidence for breast malignancy.  As long as the patient's clinical breast exam remains unchanged, annual screening   mammogram is recommended.      2. Redemonstration of a retained fragment of a localization wire is seen in the lateral left breast.  Clinical management is recommended.       BI-RADS CATEGORY:     DIAGNOSTIC CATEGORY 2--BENIGN FINDING:       RECOMMENDATIONS:   ROUTINE MAMMOGRAM AND CLINICAL EVALUATION IN 12 MONTHS.                   PLEASE NOTE: NORMAL MAMMOGRAM DOES NOT EXCLUDE THE POSSIBILITY OF BREAST CANCER.  A CLINICALLY SUSPICIOUS PALPABLE LUMP SHOULD BE BIOPSIED.       For patients over the age of 40, the target due date for the patient's next mammogram has been entered into a reminder system.       Patient received a discharge summary from the technologist after completion of exam.      Breast marker legend used on images    Triangle = Palpable lump   [place] = Skin tag or mole   BB = Nipple   Linear jose = Scar   Square = Pain         Finalized by (CST): Rosario Mosquera MD on 2/22/2024 at 7:19 AM         PROCEDURE: US VENOUS DOPPLER LEG LEFT-DIAG IMG (CPT=93971)     COMPARISON: None.     INDICATIONS: Left foot swelling     TECHNIQUE: Color duplex Doppler venous ultrasound of the left lower extremity was performed in the usual manner.     FINDINGS: The femoral and popliteal veins appear normal.  Normal flow was demonstrated  with color and pulsed Doppler.  Visualized portions of the great and small saphenous, posterior tibial, and peroneal veins appear normal.       THROMBI: None visible.  COMPRESSIBILITY: Normal.  OTHER: Negative.              Impression  CONCLUSION: No evidence of acute deep venous thrombosis in the imaged veins of the left lower extremity.           Dictated by (CST): Madi Francis MD on 7/25/2024 at 6:54 PM      Finalized by (CST): Madi Francis MD on 7/25/2024 at 6:55 PM

## 2024-08-27 ENCOUNTER — OFFICE VISIT (OUTPATIENT)
Dept: NEUROLOGY | Facility: CLINIC | Age: 79
End: 2024-08-27
Payer: MEDICARE

## 2024-08-27 VITALS — OXYGEN SATURATION: 98 % | DIASTOLIC BLOOD PRESSURE: 82 MMHG | SYSTOLIC BLOOD PRESSURE: 123 MMHG | HEART RATE: 87 BPM

## 2024-08-27 DIAGNOSIS — G62.9 LENGTH-DEPENDENT PERIPHERAL NEUROPATHY: ICD-10-CM

## 2024-08-27 DIAGNOSIS — G40.802 EPILEPSY WITH BOTH GENERALIZED AND FOCAL FEATURES (HCC): Primary | ICD-10-CM

## 2024-08-27 DIAGNOSIS — R41.89 PSEUDODEMENTIA: ICD-10-CM

## 2024-08-27 PROCEDURE — 99214 OFFICE O/P EST MOD 30 MIN: CPT | Performed by: OTHER

## 2024-08-27 RX ORDER — ZONISAMIDE 100 MG/1
100 CAPSULE ORAL 2 TIMES DAILY
Qty: 180 CAPSULE | Refills: 3 | Status: SHIPPED | OUTPATIENT
Start: 2024-08-27 | End: 2025-08-22

## 2024-08-27 RX ORDER — MEMANTINE HYDROCHLORIDE 5 MG/1
5 TABLET ORAL DAILY
Qty: 90 TABLET | Refills: 3 | Status: SHIPPED | OUTPATIENT
Start: 2024-08-27 | End: 2025-08-22

## 2024-08-27 NOTE — PROGRESS NOTES
[facility] [place]  [address], [address]  [place] [address]  [phone]          Established  Follow Up Visit       Date: August 27, 2024  Patient Name: Javier Aaron   MRN: [id number]  Primary physician: [address]  [place] [address]    Interval History:   -- She is seeing psychiatry now.  Her mood has been worse and quality of life is poor.  She had not done well when tried on trazodone.  She is overwhelmed by the progression of her 's dementia.      OUTPATIENT MEDICATIONS  Current Outpatient Medications on File Prior to Visit   Medication Sig Dispense Refill    mirtazapine 7.5 MG Oral Tab Take 1 tablet (7.5 mg total) by mouth nightly.      fluticasone propionate 50 MCG/ACT Nasal Suspension 2 sprays by Nasal route daily. Squeeze nose after sprays and do not snort it into the back of your throat. 1 each 1    memantine 5 MG Oral Tab Take 1 tablet (5 mg total) by mouth daily. 90 tablet 0    zonisamide 100 MG Oral Cap Take 1 capsule (100 mg total) by mouth in the morning and 1 capsule (100 mg total) before bedtime. 180 capsule 3    pravastatin 40 MG Oral Tab Take 1 tablet (40 mg total) by mouth daily. 90 tablet 3    montelukast 10 MG Oral Tab Take 1 tablet (10 mg total) by mouth daily. 90 tablet 3    Calcium ([facility] CALCIUM) 600 MG Oral Tab Take 600 mg by mouth 2 (two) times daily. 180 tablet 3    Trospium Chloride 20 MG Oral Tab Take 1 tablet (20 mg total) by mouth nightly.      Glucosamine-Chondroit-Vit C-Mn (GLUCOSAMINE 1500 COMPLEX OR) Take 2 capsules by mouth daily.      Multiple Vitamins-Minerals (MULTI FOR HER 50+) Oral Tab Take  by mouth.      traZODone 50 MG Oral Tab Take 1/2 tablet in the morning and 1 tablet at night (Patient not taking: Reported on 8/22/2024) 270 tablet 3     No current facility-administered medications on file prior to visit.         PHYSICAL EXAM:     /82 (BP Location: Right arm, Patient Position: Sitting)   Pulse 87   SpO2 98%   General  appearance: Well appearing, and in no acute distress  Skin: skin color, texture normal.  No rashes or lesions.    Head: Normocephalic, atraumatic.    Neurological exam:    Mental Status:   Attention/Concentration: intact attention on bedside test   Fund of knowledge: intact  Speech: no dysarthria or aphasia     LABS/DATA:  N/A      IMAGING:  N/A    ASSESSMENT:  The patient is a 78 year old woman with past medical history of ocular migraines, left-sided breast cancer status post lumpectomy lymph node dissection and chemotherapy/radiation 2006 complicated by  chemotherapy-induced peripheral neuropathy, allergic rhinitis and asthma, anxiety who presents for follow up.        Her neurological examination was significant for length dependent peripheral neuropathy.      -CT brain 1/2021: Mild cortical atrophy and chronic microvascular ischemic changes  -MRI brain [facility] 9/12/2021: chronic microvascular ischemic changes   -Negative/normal, monoclonal proteins, B12 and MMA, folic acid, TSH, B1 and B6, vitamin E   --EEG 11/2021 - normal   --CT brain 4/2023 - no acute findings   -Normal/negative vitamin E, ammonia, folic acid, vitamin B12/MMA, vitamin B6 and vitamin B1 levels     Focal and generalized epilepsy has not been able to tolerate Keppra or Lamotrigine.  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   --Podiatry yearly evaluation, discussed    We discussed transition to assisted living - decisions to be made when she is more medically stable.  Continue home health.  Has a good support system.      Discussed indication,  administration, dose, and side effects with patient of any medications personally prescribed. Patient was advised to let my office know if they have any questions or concerns.       Today, I personally spent 20 minutes in this case, including chart review, time spent with patient doing face to face evaluation w/ interview and exam and patient education, counseling, and time was spent in patient education, counseling, and coordination of care as described above.   Issues discussed: Diagnosis and implications on future health, benefits and side effects of present and future medications, test results as well as further testing and medications required.    This note was prepared using Dragon Medical voice recognition dictation software and as a result, errors may occur. When identified, these errors have been corrected. While every attempt is made to correct errors during dictation, discrepancies may still exist    JANE Corona DO   Staff Physician, Neurology   8/27/2024  2:07 PM

## 2024-08-28 ENCOUNTER — PATIENT OUTREACH (OUTPATIENT)
Dept: CASE MANAGEMENT | Age: 79
End: 2024-08-28

## 2024-08-28 DIAGNOSIS — M89.9 DISORDER OF BONE AND CARTILAGE: ICD-10-CM

## 2024-08-28 DIAGNOSIS — G40.309 NONINTRACTABLE GENERALIZED IDIOPATHIC EPILEPSY WITHOUT STATUS EPILEPTICUS (HCC): ICD-10-CM

## 2024-08-28 DIAGNOSIS — F41.1 GAD (GENERALIZED ANXIETY DISORDER): ICD-10-CM

## 2024-08-28 DIAGNOSIS — F32.1 CURRENT MODERATE EPISODE OF MAJOR DEPRESSIVE DISORDER, UNSPECIFIED WHETHER RECURRENT (HCC): ICD-10-CM

## 2024-08-28 DIAGNOSIS — I10 PRIMARY HYPERTENSION: Primary | ICD-10-CM

## 2024-08-28 DIAGNOSIS — H25.13 AGE-RELATED NUCLEAR CATARACT OF BOTH EYES: ICD-10-CM

## 2024-08-28 DIAGNOSIS — M94.9 DISORDER OF BONE AND CARTILAGE: ICD-10-CM

## 2024-08-28 NOTE — PROGRESS NOTES
8/28/2024  Spoke to Javier for [facility].      Updates to patient care team/ comments: UTD  Patient reported changes in medications: None  Med Adherence  Comment: Taking as prescribed    Health Maintenance:  Reviewed with patient   Health Maintenance   Topic Date Due    Zoster Vaccines (1 of 2) Never done    Annual Physical  09/19/2023    COVID-19 Vaccine (7 - 2023-24 season) 03/10/2024    Influenza Vaccine (1) 10/01/2024    Mammogram  02/19/2025    DEXA Scan  Completed    Annual Depression Screening  Completed    Fall Risk Screening (Annual)  Completed    Pneumococcal Vaccine: 65+ Years  Completed    Colorectal Cancer Screening  Discontinued       Patient current concerns: Spoke to the patient, who states she has been experiencing foot numbness but is unable to clearly describe the feeling. Patient reports that she was prescribed Mirtazapine by Paige Ramírez LCSW for depression last week. Patient states the symptoms started after starting the medication, she has an appointment tomorrow which she plans to discuss the medication.    Patient mentioned experiencing swelling to the foot last month and was seen at urgent care, followed up with her PCP, and had an ultrasound, which ruled out a DVT. Patient states that her foot is no longer swollen, and that the numbness is not related to the swelling.   Patient has been experiencing allergy symptoms and is using Flonase and montelukast.   Patient continues to receive home health services, a nurse visits every two weeks.   Patient reports being compliant with her medication regimen, states her daughter manages and sorts her medications. Patient denies recent falls, she does have gait issues which she uses a walker to ambulate. Patient lives with her  in a condo, she has assistance from her children with appointments, medications, and grocery shopping.   Goals/Action Plan:    Active goal from previous outreach: work on in-home stretches    Patient reported progress towards  goals: slowly progressing               - What: work on in-home stretches           - Where/When/How: work on daily stretches  Patient Reported Barriers and Concerns: No barriers                   - Plan for overcoming barriers:       Care managers interventions:  Reviewed care team, health maintenance, and medication list. Reminded patient she is overdue for her Medicare physical. Listened to patient's voice her health concerns and provided support. Continued to provide education on how to better manage and cope with chronic conditions. Informed of the importance of reporting any new symptoms to prevent any health complications.     Appointments reviewed with patient.     Future Appointments   Date Time Provider Department Center   10/3/2024  1:40 PM Caryn Corona DO [facility]   10/7/2024  3:00 PM Da Rocha DO [facility] [place]   8/25/2025  3:30 PM Dominic Manzo MD [facility]        Next Care Manager Follow Up Date: One month    Reason For Follow Up: review progress and or barriers towards patient's goals.     Time Spent This Encounter Total: 22 min medical record review, telephone communication, care plan updates where needed, education, goals, and action plan recreation/update. Provided acknowledgment and validation to patient's concerns.   Monthly Minute Total including today: 22 min  Physical assessment, complete health history, and need for CCM established by Da Rocha DO.

## 2024-08-29 ENCOUNTER — PATIENT MESSAGE (OUTPATIENT)
Dept: NEUROLOGY | Facility: CLINIC | Age: 79
End: 2024-08-29

## 2024-08-29 NOTE — TELEPHONE ENCOUNTER
From: Javeir Aaron  To: Caryn Corona  Sent: 8/29/2024 9:40 AM CDT  Subject: Neuropathy    Hello Dr. Corona,    At my visit on Tuesday, I forgot to mention that I am now experiencing neuropathy in my right foot.    Is this something I should be concerned with since this just started about three weeks ago?    Is there anything I can do for this?    Thank you,    Javier Aaron

## 2024-09-04 ENCOUNTER — TELEPHONE (OUTPATIENT)
Dept: FAMILY MEDICINE CLINIC | Facility: CLINIC | Age: 79
End: 2024-09-04

## 2024-09-19 ENCOUNTER — TELEPHONE (OUTPATIENT)
Dept: FAMILY MEDICINE CLINIC | Facility: CLINIC | Age: 79
End: 2024-09-19

## 2024-09-23 ENCOUNTER — OFFICE VISIT (OUTPATIENT)
Dept: FAMILY MEDICINE CLINIC | Facility: CLINIC | Age: 79
End: 2024-09-23
Payer: MEDICARE

## 2024-09-23 VITALS
WEIGHT: 109 LBS | HEART RATE: 88 BPM | BODY MASS INDEX: 20.58 KG/M2 | SYSTOLIC BLOOD PRESSURE: 136 MMHG | HEIGHT: 61 IN | DIASTOLIC BLOOD PRESSURE: 80 MMHG

## 2024-09-23 DIAGNOSIS — E03.9 HYPOTHYROIDISM, UNSPECIFIED TYPE: ICD-10-CM

## 2024-09-23 DIAGNOSIS — G62.9 NEUROPATHY: ICD-10-CM

## 2024-09-23 DIAGNOSIS — I10 PRIMARY HYPERTENSION: ICD-10-CM

## 2024-09-23 DIAGNOSIS — R73.01 IFG (IMPAIRED FASTING GLUCOSE): Primary | ICD-10-CM

## 2024-09-23 NOTE — PROGRESS NOTES
Blood pressure 136/80, pulse 88, height 5' 1\" (1.549 m), weight 109 lb (49.4 kg).          Patient presents today complaining of right foot neuropathy that has been bothersome.  She noticed a correlating with her increased dose of trazodone although she only took an increased dose of trazodone for 1 day.  She inquired with psychiatry who prescribed the medication psychiatry did not think trazodone was the cause.  Has an appointment with neurology in 2 weeks.  Quit smoking 40 to 50 years ago.    Objective    Foot with good pulses and intact skin on the right side    Good capillary refill    Assessment neuropathy  History of impaired fasting glucose with hypothyroidism and hypertension  Plan continue present medication fasting blood test ordered

## 2024-09-24 ENCOUNTER — LAB ENCOUNTER (OUTPATIENT)
Dept: LAB | Age: 79
End: 2024-09-24
Attending: FAMILY MEDICINE
Payer: MEDICARE

## 2024-09-24 DIAGNOSIS — R73.01 IFG (IMPAIRED FASTING GLUCOSE): ICD-10-CM

## 2024-09-24 DIAGNOSIS — G62.9 NEUROPATHY: ICD-10-CM

## 2024-09-24 DIAGNOSIS — S03.00XA DISLOCATION OF TEMPOROMANDIBULAR JOINT, INITIAL ENCOUNTER: ICD-10-CM

## 2024-09-24 DIAGNOSIS — I10 PRIMARY HYPERTENSION: ICD-10-CM

## 2024-09-24 LAB
ALBUMIN SERPL-MCNC: 4.6 G/DL (ref 3.2–4.8)
ALBUMIN/GLOB SERPL: 2 {RATIO} (ref 1–2)
ALP LIVER SERPL-CCNC: 71 U/L
ALT SERPL-CCNC: 18 U/L
ANION GAP SERPL CALC-SCNC: 6 MMOL/L (ref 0–18)
AST SERPL-CCNC: 22 U/L (ref ?–34)
BASOPHILS # BLD AUTO: 0.04 X10(3) UL (ref 0–0.2)
BASOPHILS NFR BLD AUTO: 0.4 %
BILIRUB SERPL-MCNC: 1.2 MG/DL (ref 0.2–1.1)
BUN BLD-MCNC: 13 MG/DL (ref 9–23)
BUN/CREAT SERPL: 17.6 (ref 10–20)
CALCIUM BLD-MCNC: 11.2 MG/DL (ref 8.7–10.4)
CHLORIDE SERPL-SCNC: 112 MMOL/L (ref 98–112)
CHOLEST SERPL-MCNC: 141 MG/DL (ref ?–200)
CO2 SERPL-SCNC: 27 MMOL/L (ref 21–32)
CREAT BLD-MCNC: 0.74 MG/DL
DEPRECATED RDW RBC AUTO: 44.7 FL (ref 35.1–46.3)
EGFRCR SERPLBLD CKD-EPI 2021: 84 ML/MIN/1.73M2 (ref 60–?)
EOSINOPHIL # BLD AUTO: 0.44 X10(3) UL (ref 0–0.7)
EOSINOPHIL NFR BLD AUTO: 4.3 %
ERYTHROCYTE [DISTWIDTH] IN BLOOD BY AUTOMATED COUNT: 13.1 % (ref 11–15)
EST. AVERAGE GLUCOSE BLD GHB EST-MCNC: 100 MG/DL (ref 68–126)
FASTING PATIENT LIPID ANSWER: YES
FASTING STATUS PATIENT QL REPORTED: YES
GLOBULIN PLAS-MCNC: 2.3 G/DL (ref 2–3.5)
GLUCOSE BLD-MCNC: 107 MG/DL (ref 70–99)
HBA1C MFR BLD: 5.1 % (ref ?–5.7)
HCT VFR BLD AUTO: 40.4 %
HDLC SERPL-MCNC: 37 MG/DL (ref 40–59)
HGB BLD-MCNC: 14.1 G/DL
IMM GRANULOCYTES # BLD AUTO: 0.04 X10(3) UL (ref 0–1)
IMM GRANULOCYTES NFR BLD: 0.4 %
LDLC SERPL CALC-MCNC: 83 MG/DL (ref ?–100)
LYMPHOCYTES # BLD AUTO: 0.82 X10(3) UL (ref 1–4)
LYMPHOCYTES NFR BLD AUTO: 8.1 %
MCH RBC QN AUTO: 32.5 PG (ref 26–34)
MCHC RBC AUTO-ENTMCNC: 34.9 G/DL (ref 31–37)
MCV RBC AUTO: 93.1 FL
MONOCYTES # BLD AUTO: 0.55 X10(3) UL (ref 0.1–1)
MONOCYTES NFR BLD AUTO: 5.4 %
NEUTROPHILS # BLD AUTO: 8.29 X10 (3) UL (ref 1.5–7.7)
NEUTROPHILS # BLD AUTO: 8.29 X10(3) UL (ref 1.5–7.7)
NEUTROPHILS NFR BLD AUTO: 81.4 %
NONHDLC SERPL-MCNC: 104 MG/DL (ref ?–130)
OSMOLALITY SERPL CALC.SUM OF ELEC: 301 MOSM/KG (ref 275–295)
PLATELET # BLD AUTO: 197 10(3)UL (ref 150–450)
POTASSIUM SERPL-SCNC: 3.3 MMOL/L (ref 3.5–5.1)
PROT SERPL-MCNC: 6.9 G/DL (ref 5.7–8.2)
RBC # BLD AUTO: 4.34 X10(6)UL
SODIUM SERPL-SCNC: 145 MMOL/L (ref 136–145)
T PALLIDUM AB SER QL IA: NONREACTIVE
TRIGL SERPL-MCNC: 112 MG/DL (ref 30–149)
TSI SER-ACNC: 3.98 MIU/ML (ref 0.55–4.78)
VLDLC SERPL CALC-MCNC: 18 MG/DL (ref 0–30)
WBC # BLD AUTO: 10.2 X10(3) UL (ref 4–11)

## 2024-09-24 PROCEDURE — 84443 ASSAY THYROID STIM HORMONE: CPT

## 2024-09-24 PROCEDURE — 83036 HEMOGLOBIN GLYCOSYLATED A1C: CPT

## 2024-09-24 PROCEDURE — 86780 TREPONEMA PALLIDUM: CPT

## 2024-09-24 PROCEDURE — 80053 COMPREHEN METABOLIC PANEL: CPT

## 2024-09-24 PROCEDURE — 36415 COLL VENOUS BLD VENIPUNCTURE: CPT

## 2024-09-24 PROCEDURE — 85025 COMPLETE CBC W/AUTO DIFF WBC: CPT

## 2024-09-24 PROCEDURE — 86618 LYME DISEASE ANTIBODY: CPT

## 2024-09-24 PROCEDURE — 80061 LIPID PANEL: CPT

## 2024-09-25 LAB — B BURGDOR IGG+IGM SER QL: NEGATIVE

## 2024-09-26 ENCOUNTER — PATIENT OUTREACH (OUTPATIENT)
Dept: CASE MANAGEMENT | Age: 79
End: 2024-09-26

## 2024-09-26 DIAGNOSIS — G40.309 NONINTRACTABLE GENERALIZED IDIOPATHIC EPILEPSY WITHOUT STATUS EPILEPTICUS (HCC): ICD-10-CM

## 2024-09-26 DIAGNOSIS — M94.9 DISORDER OF BONE AND CARTILAGE: ICD-10-CM

## 2024-09-26 DIAGNOSIS — H25.13 AGE-RELATED NUCLEAR CATARACT OF BOTH EYES: ICD-10-CM

## 2024-09-26 DIAGNOSIS — I10 PRIMARY HYPERTENSION: Primary | ICD-10-CM

## 2024-09-26 DIAGNOSIS — J30.1 SEASONAL ALLERGIC RHINITIS DUE TO POLLEN: ICD-10-CM

## 2024-09-26 DIAGNOSIS — F41.1 GAD (GENERALIZED ANXIETY DISORDER): ICD-10-CM

## 2024-09-26 DIAGNOSIS — F32.1 CURRENT MODERATE EPISODE OF MAJOR DEPRESSIVE DISORDER, UNSPECIFIED WHETHER RECURRENT (HCC): ICD-10-CM

## 2024-09-26 DIAGNOSIS — M89.9 DISORDER OF BONE AND CARTILAGE: ICD-10-CM

## 2024-09-26 RX ORDER — MIRTAZAPINE 15 MG/1
15 TABLET, FILM COATED ORAL NIGHTLY
COMMUNITY
Start: 2024-09-19

## 2024-09-26 NOTE — PROGRESS NOTES
9/26/2024  Spoke to Javier for [facility].      Updates to patient care team/ comments: UTD  Patient reported changes in medications: None  Med Adherence  Comment: unsure if she is taking calcium as her daughter manages her medications.     Health Maintenance:  Reviewed with patient.   Health Maintenance   Topic Date Due    Zoster Vaccines (1 of 2) Never done    Annual Physical  09/19/2023    COVID-19 Vaccine (7 - 2023-24 season) 09/01/2024    Influenza Vaccine (1) 10/01/2024    Mammogram  02/19/2025    DEXA Scan  Completed    Annual Depression Screening  Completed    Fall Risk Screening (Annual)  Completed    Pneumococcal Vaccine: 65+ Years  Completed    Colorectal Cancer Screening  Discontinued       Patient current concerns: Spoke to the patient, who reports experiencing right foot tingling sensation which she feels is associated to Mirtazapine although she has been told it is not the cause. Patient was seen by PCP Dr. Rocha to discuss neuropathy, bloodwork was ordered and completed, she is awaiting the results. Patient's daughter reached out to Neurology Dr. Corona regarding her concerns, an appointment was scheduled for 09/30/24.   Patient continues to receive home health services, a nurse visits every two weeks.   Patient reports being compliant with her medication regimen, states her daughter manages and sorts her medications. Patient denies recent falls, she does have gait issues which she uses a walker to ambulate. Patient lives with her  in a condo, she has assistance from her children with appointments, medications, and grocery shopping.   Goals/Action Plan:    Active goal from previous outreach: work on in-home stretches    Patient reported progress towards goals: no progress               - What: work on in-home stretches           - Where/When/How: work on daily stretches  Patient Reported Barriers and Concerns: no barriers reported                    - Plan for overcoming barriers: N/A    Care  managers interventions: Reviewed care team, health maintenance, and medication list. Listened to patient's voice her health concerns and provided support. Continued to provide education on how to better manage and cope with chronic conditions. Informed of the importance of reporting any new symptoms to prevent any health complications.      Appointments reviewed with patient.   Future Appointments   Date Time Provider Department Center   9/30/2024  2:00 PM Maxwell Montero MD [facility] [place]   10/3/2024  1:40 PM [facility]   10/7/2024  3:00 PM Da Rocha DO [facility] [place]   8/25/2025  3:30 PM Dominic Manzo MD [facility] HEM [facility]        Next Care Manager Follow Up Date: One month    Reason For Follow Up: review progress and or barriers towards patient's goals.     Time Spent This Encounter Total: 26 min medical record review, telephone communication, care plan updates where needed, education, goals, and action plan recreation/update. Provided acknowledgment and validation to patient's concerns.   Monthly Minute Total including today: 26 min  Physical assessment, complete health history, and need for CCM established by Da Rocha DO.

## 2024-09-27 ENCOUNTER — TELEPHONE (OUTPATIENT)
Dept: FAMILY MEDICINE CLINIC | Facility: CLINIC | Age: 79
End: 2024-09-27

## 2024-09-27 DIAGNOSIS — E83.52 HYPERCALCEMIA: Primary | ICD-10-CM

## 2024-09-30 ENCOUNTER — LAB ENCOUNTER (OUTPATIENT)
Dept: LAB | Age: 79
End: 2024-09-30
Attending: FAMILY MEDICINE
Payer: MEDICARE

## 2024-09-30 ENCOUNTER — OFFICE VISIT (OUTPATIENT)
Dept: DERMATOLOGY CLINIC | Facility: CLINIC | Age: 79
End: 2024-09-30
Payer: MEDICARE

## 2024-09-30 DIAGNOSIS — L82.1 SEBORRHEIC KERATOSES: Primary | ICD-10-CM

## 2024-09-30 DIAGNOSIS — E83.52 HYPERCALCEMIA: ICD-10-CM

## 2024-09-30 DIAGNOSIS — L81.4 LENTIGINES: ICD-10-CM

## 2024-09-30 LAB
BILIRUB DIRECT SERPL-MCNC: 0.3 MG/DL (ref ?–0.3)
BILIRUB SERPL-MCNC: 0.9 MG/DL (ref 0.2–1.1)
PTH-INTACT SERPL-MCNC: 32.5 PG/ML (ref 18.5–88)
VIT D+METAB SERPL-MCNC: 105.8 NG/ML (ref 30–100)

## 2024-09-30 PROCEDURE — 99203 OFFICE O/P NEW LOW 30 MIN: CPT | Performed by: STUDENT IN AN ORGANIZED HEALTH CARE EDUCATION/TRAINING PROGRAM

## 2024-09-30 PROCEDURE — 82248 BILIRUBIN DIRECT: CPT

## 2024-09-30 PROCEDURE — 83970 ASSAY OF PARATHORMONE: CPT

## 2024-09-30 PROCEDURE — 84165 PROTEIN E-PHORESIS SERUM: CPT

## 2024-09-30 PROCEDURE — 82542 COL CHROMOTOGRAPHY QUAL/QUAN: CPT

## 2024-09-30 PROCEDURE — 36415 COLL VENOUS BLD VENIPUNCTURE: CPT

## 2024-09-30 PROCEDURE — 82247 BILIRUBIN TOTAL: CPT

## 2024-09-30 PROCEDURE — 82306 VITAMIN D 25 HYDROXY: CPT

## 2024-09-30 NOTE — PROGRESS NOTES
New Patient    Referred by: No referring provider defined for this encounter.    CHIEF COMPLAINT: Lesion of concern     HISTORY OF PRESENT ILLNESS: Javier Aaron is a 78 year old female here for evaluation of lesion of concern.    1. Growth   Location: L Forearm   Duration: Weeks   Signs and symptoms: Discoloration   Current treatment: None  Past treatments: None    Personal Dermatologic History  History of skin cancer: No  History of  atypical moles: No    FAMILY HISTORY:  History of melanoma: No    Past Medical History  Past Medical History:    Asthma (HCC)    asthma as a child    Breast CA (HCC)    Breast cancer, left (HCC)    PER nh: LUMPECTOMY 04/2006'; LYMPH NODE LEFT; LT LYMPH NODE DISSECTION, 6 out of 10    Cancer (HCC)    Cataract    Encounter for chemotherapy management    per NG: chemo for breast cancer    Extrinsic asthma, unspecified    Hearing impairment    bilateral hearing aids    Hemorrhoids    History of Papanicolaou smear of cervix    DONE [place] [facility]    Radiation    per NG: Lt breast radiation /Dr Champagne    Seizure disorder ([facility])    1 time event- taking meds for prevention       REVIEW OF SYSTEMS:  Constitutional: Denies fever, chills, unintentional weight loss.   Skin as per HPI    Medications  Current Outpatient Medications   Medication Sig Dispense Refill    mirtazapine 15 MG Oral Tab Take 1 tablet (15 mg total) by mouth nightly. TAKE AT BEDTIME      memantine 5 MG Oral Tab Take 1 tablet (5 mg total) by mouth daily. 90 tablet 3    zonisamide 100 MG Oral Cap Take 1 capsule (100 mg total) by mouth in the morning and 1 capsule (100 mg total) before bedtime. 180 capsule 3    fluticasone propionate 50 MCG/ACT Nasal Suspension 2 sprays by Nasal route daily. Squeeze nose after sprays and do not snort it into the back of your throat. 1 each 1    pravastatin 40 MG Oral Tab Take 1 tablet (40 mg total) by mouth daily. 90 tablet 3    montelukast 10 MG Oral Tab Take 1 tablet (10 mg total) by mouth daily. 90 tablet  3    Calcium ([employer] CALCIUM) 600 MG Oral Tab Take 600 mg by mouth 2 (two) times daily. 180 tablet 3    Trospium Chloride 20 MG Oral Tab Take 1 tablet (20 mg total) by mouth nightly.      Glucosamine-Chondroit-Vit C-Mn (GLUCOSAMINE 1500 COMPLEX OR) Take 2 capsules by mouth daily.      Multiple Vitamins-Minerals (MULTI FOR HER 50+) Oral Tab Take  by mouth.         PHYSICAL EXAM:  General: awake, alert, no acute distress  Neuropsych: appropriate mood and affect  Eyes: Sclerae anicteric, without conjunctival injection, eyelids unremarkable  Skin: Skin exam was performed today including the following: face and forearm. Pertinent findings include:   - with brown forearm papules  - with stellate brown macules    ASSESSMENT & PLAN:  Pathophysiology of diagnoses discussed with patient.  Therapeutic options reviewed. Risks, benefits, and alternatives discussed with patient. Instructions reviewed at length.    #Seborrheic Keratoses  - Reassured patient regarding benign nature of lesions. No treatment but observation at this time. Follow-up for concerning physical changes or new symptoms.    #Lentigines  - Discussed benign appearance and provided reassurance. No treatment but observation at this time. Follow-up for concerning physical changes or new symptoms.  - Recommend sun protection with spf 30 or higher, sun protective clothing such as wide brimmed hats and long sleeves. Recommend avoiding midday sun (10 am- 3 pm).     Return to clinic: as needed or sooner if something concerning arises     Maxwell Montero MD

## 2024-09-30 NOTE — TELEPHONE ENCOUNTER
Patient informed of results and recommendations, verbalized understanding.  (Name and  verified)   Patient states she had no ride, will go this afternoon.

## 2024-10-01 LAB
ALBUMIN SERPL ELPH-MCNC: 3.88 G/DL (ref 3.75–5.21)
ALBUMIN/GLOB SERPL: 1.67 {RATIO} (ref 1–2)
ALPHA1 GLOB SERPL ELPH-MCNC: 0.32 G/DL (ref 0.19–0.46)
ALPHA2 GLOB SERPL ELPH-MCNC: 0.76 G/DL (ref 0.48–1.05)
B-GLOBULIN SERPL ELPH-MCNC: 0.64 G/DL (ref 0.68–1.23)
GAMMA GLOB SERPL ELPH-MCNC: 0.61 G/DL (ref 0.62–1.7)
PROT SERPL-MCNC: 6.2 G/DL (ref 5.7–8.2)

## 2024-10-03 ENCOUNTER — LAB ENCOUNTER (OUTPATIENT)
Dept: LAB | Age: 79
End: 2024-10-03
Attending: Other
Payer: MEDICARE

## 2024-10-03 ENCOUNTER — OFFICE VISIT (OUTPATIENT)
Dept: NEUROLOGY | Facility: CLINIC | Age: 79
End: 2024-10-03
Payer: MEDICARE

## 2024-10-03 VITALS — BODY MASS INDEX: 20.58 KG/M2 | WEIGHT: 109 LBS | HEIGHT: 61 IN

## 2024-10-03 DIAGNOSIS — G40.802 EPILEPSY WITH BOTH GENERALIZED AND FOCAL FEATURES (HCC): ICD-10-CM

## 2024-10-03 DIAGNOSIS — G62.9 LENGTH-DEPENDENT PERIPHERAL NEUROPATHY: ICD-10-CM

## 2024-10-03 DIAGNOSIS — R41.89 PSEUDODEMENTIA: ICD-10-CM

## 2024-10-03 DIAGNOSIS — G62.9 LENGTH-DEPENDENT PERIPHERAL NEUROPATHY: Primary | ICD-10-CM

## 2024-10-03 LAB — FOLATE SERPL-MCNC: 36.6 NG/ML (ref 5.4–?)

## 2024-10-03 PROCEDURE — 84207 ASSAY OF VITAMIN B-6: CPT

## 2024-10-03 PROCEDURE — 84425 ASSAY OF VITAMIN B-1: CPT

## 2024-10-03 PROCEDURE — 86255 FLUORESCENT ANTIBODY SCREEN: CPT | Performed by: OTHER

## 2024-10-03 PROCEDURE — 83519 RIA NONANTIBODY: CPT

## 2024-10-03 PROCEDURE — 83921 ORGANIC ACID SINGLE QUANT: CPT

## 2024-10-03 PROCEDURE — 84446 ASSAY OF VITAMIN E: CPT

## 2024-10-03 PROCEDURE — 99214 OFFICE O/P EST MOD 30 MIN: CPT | Performed by: OTHER

## 2024-10-03 PROCEDURE — 36415 COLL VENOUS BLD VENIPUNCTURE: CPT

## 2024-10-03 PROCEDURE — 82746 ASSAY OF FOLIC ACID SERUM: CPT

## 2024-10-03 RX ORDER — LIDOCAINE 50 MG/G
OINTMENT TOPICAL
Qty: 30 G | Refills: 0 | Status: SHIPPED | OUTPATIENT
Start: 2024-10-03 | End: 2024-10-07

## 2024-10-03 NOTE — PROGRESS NOTES
[facility]  [address], [address]  [place] [address]  [phone]          Established  Follow Up Visit       Date: October 3, 2024  Patient Name: Javier Aaron   MRN: [id number]  Primary physician: [address] [facility] [address]  [place] [address]    Interval History:   -- Patient is feeling more neuropathy symptoms in her toes and the plantar aspects of her feet.  Feels like it was exacerbated after taking trazodone.  She was then changed to mirtazapine.    -- When she first developed neuropathy it was primarily in her left lower extremity, now involving her right lower extremity for about few months.  Her right toes and ball of her foot, sometimes heel.  Numbness, not sore.  Aggravating.  Tingling.      OUTPATIENT MEDICATIONS  Current Outpatient Medications on File Prior to Visit   Medication Sig Dispense Refill    mirtazapine 15 MG Oral Tab Take 1 tablet (15 mg total) by mouth nightly. TAKE AT BEDTIME      memantine 5 MG Oral Tab Take 1 tablet (5 mg total) by mouth daily. 90 tablet 3    zonisamide 100 MG Oral Cap Take 1 capsule (100 mg total) by mouth in the morning and 1 capsule (100 mg total) before bedtime. 180 capsule 3    fluticasone propionate 50 MCG/ACT Nasal Suspension 2 sprays by Nasal route daily. Squeeze nose after sprays and do not snort it into the back of your throat. 1 each 1    pravastatin 40 MG Oral Tab Take 1 tablet (40 mg total) by mouth daily. 90 tablet 3    montelukast 10 MG Oral Tab Take 1 tablet (10 mg total) by mouth daily. 90 tablet 3    Calcium ([employer] CALCIUM) 600 MG Oral Tab Take 600 mg by mouth 2 (two) times daily. 180 tablet 3    Trospium Chloride 20 MG Oral Tab Take 1 tablet (20 mg total) by mouth nightly.      Glucosamine-Chondroit-Vit C-Mn (GLUCOSAMINE 1500 COMPLEX OR) Take 2 capsules by mouth daily.      Multiple Vitamins-Minerals (MULTI FOR HER 50+) Oral Tab Take  by mouth.       No current facility-administered medications on file prior to visit.          PHYSICAL EXAM:     Ht 61\"   Wt 109 lb (49.4 kg)   BMI 20.60 kg/m²   General appearance: Well appearing, and in no acute distress  Skin: skin color, texture normal.  No rashes or lesions.    Head: Normocephalic, atraumatic.    Neurological exam:    Mental Status:   Attention/Concentration: intact attention on bedside test   Fund of knowledge: intact  Speech: no dysarthria or aphasia     Length dependent loss of vibration in toes bilaterally     LABS/DATA:  Hypercalcemia and vitamin D toxicity   SPEP reviewed       IMAGING:  N/A    ASSESSMENT:  The patient is a 78 year old woman with past medical history of ocular migraines, left-sided breast cancer status post lumpectomy lymph node dissection and chemotherapy/radiation 2006 complicated by  chemotherapy-induced peripheral neuropathy, allergic rhinitis and asthma, anxiety who presents for follow up.        Her neurological examination was significant for length dependent peripheral neuropathy.      -CT brain 1/2021: Mild cortical atrophy and chronic microvascular ischemic changes  -MRI brain [facility] 9/12/2021: chronic microvascular ischemic changes   -Negative/normal, monoclonal proteins, B12 and MMA, folic acid, TSH, B1 and B6, vitamin E   --EEG 11/2021 - normal   --CT brain 4/2023 - no acute findings   -Normal/negative vitamin E, ammonia, folic acid, vitamin B12/MMA, vitamin B6 and vitamin B1 levels     Focal and generalized epilepsy has not been able to tolerate Keppra or Lamotrigine.  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  Flare up of symptoms - may be stress-related - will do  updated lab work to look for provoking cause; has had lab testing in past that was normal - most recently in March 2024  --Trial of topical Lidocaine 5%   --Updated neuropathy lab testing   --Podiatry yearly evaluation, discussed    Follow up: 5 weeks     Discussed indication, administration, dose, and side effects with patient of any medications personally prescribed. Patient was advised to let my office know if they have any questions or concerns.       Today, I personally spent 15 minutes in this case, including chart review, time spent with patient doing face to face evaluation w/ interview and exam and patient education, counseling, and time was spent in patient education, counseling, and coordination of care as described above.   Issues discussed: Diagnosis and implications on future health, benefits and side effects of present and future medications, test results as well as further testing and medications required.    This note was prepared using Dragon Medical voice recognition dictation software and as a result, errors may occur. When identified, these errors have been corrected. While every attempt is made to correct errors during dictation, discrepancies may still exist    JANE Corona DO   Staff Physician, Neurology   10/3/2024  1:37 PM

## 2024-10-04 ENCOUNTER — TELEPHONE (OUTPATIENT)
Dept: NEUROLOGY | Facility: CLINIC | Age: 79
End: 2024-10-04

## 2024-10-07 ENCOUNTER — LAB ENCOUNTER (OUTPATIENT)
Dept: LAB | Age: 79
End: 2024-10-07
Attending: FAMILY MEDICINE
Payer: MEDICARE

## 2024-10-07 ENCOUNTER — EKG ENCOUNTER (OUTPATIENT)
Dept: LAB | Age: 79
End: 2024-10-07
Attending: FAMILY MEDICINE
Payer: MEDICARE

## 2024-10-07 ENCOUNTER — OFFICE VISIT (OUTPATIENT)
Dept: FAMILY MEDICINE CLINIC | Facility: CLINIC | Age: 79
End: 2024-10-07
Payer: MEDICARE

## 2024-10-07 ENCOUNTER — HOSPITAL ENCOUNTER (OUTPATIENT)
Dept: GENERAL RADIOLOGY | Age: 79
Discharge: HOME | End: 2024-10-07
Attending: FAMILY MEDICINE
Payer: MEDICARE

## 2024-10-07 VITALS
BODY MASS INDEX: 19.45 KG/M2 | WEIGHT: 103 LBS | DIASTOLIC BLOOD PRESSURE: 81 MMHG | HEART RATE: 89 BPM | SYSTOLIC BLOOD PRESSURE: 122 MMHG | HEIGHT: 61 IN | RESPIRATION RATE: 17 BRPM

## 2024-10-07 DIAGNOSIS — Z00.00 ENCOUNTER FOR ANNUAL HEALTH EXAMINATION: ICD-10-CM

## 2024-10-07 DIAGNOSIS — R63.4 WEIGHT LOSS: ICD-10-CM

## 2024-10-07 DIAGNOSIS — J45.20 MILD INTERMITTENT ASTHMA WITHOUT COMPLICATION (HCC): ICD-10-CM

## 2024-10-07 DIAGNOSIS — Z00.00 MEDICARE ANNUAL WELLNESS VISIT, SUBSEQUENT: Primary | ICD-10-CM

## 2024-10-07 DIAGNOSIS — M85.80 OSTEOPENIA, UNSPECIFIED LOCATION: ICD-10-CM

## 2024-10-07 DIAGNOSIS — Z00.00 MEDICARE ANNUAL WELLNESS VISIT, SUBSEQUENT: ICD-10-CM

## 2024-10-07 DIAGNOSIS — I10 PRIMARY HYPERTENSION: ICD-10-CM

## 2024-10-07 DIAGNOSIS — I77.1 TORTUOUS AORTA (HCC): ICD-10-CM

## 2024-10-07 DIAGNOSIS — Z78.0 POSTMENOPAUSAL: ICD-10-CM

## 2024-10-07 DIAGNOSIS — F41.1 GAD (GENERALIZED ANXIETY DISORDER): ICD-10-CM

## 2024-10-07 DIAGNOSIS — C40.22 MALIGNANT NEOPLASM OF LONG BONE OF LEFT LOWER EXTREMITY (HCC): ICD-10-CM

## 2024-10-07 DIAGNOSIS — Z85.3 HISTORY OF BREAST CANCER: ICD-10-CM

## 2024-10-07 DIAGNOSIS — G40.309 NONINTRACTABLE GENERALIZED IDIOPATHIC EPILEPSY WITHOUT STATUS EPILEPTICUS (HCC): ICD-10-CM

## 2024-10-07 LAB
BILIRUB UR QL: NEGATIVE
COLOR UR: YELLOW
GLUCOSE UR-MCNC: NORMAL MG/DL
HGB UR QL STRIP.AUTO: NEGATIVE
KETONES UR-MCNC: NEGATIVE MG/DL
LEUKOCYTE ESTERASE UR QL STRIP.AUTO: NEGATIVE
NITRITE UR QL STRIP.AUTO: NEGATIVE
PH UR: 5.5 [PH] (ref 5–8)
SP GR UR STRIP: 1.03 (ref 1–1.03)
UROBILINOGEN UR STRIP-ACNC: 2

## 2024-10-07 PROCEDURE — 93005 ELECTROCARDIOGRAM TRACING: CPT

## 2024-10-07 PROCEDURE — 71046 X-RAY EXAM CHEST 2 VIEWS: CPT | Performed by: FAMILY MEDICINE

## 2024-10-07 PROCEDURE — 93010 ELECTROCARDIOGRAM REPORT: CPT | Performed by: STUDENT IN AN ORGANIZED HEALTH CARE EDUCATION/TRAINING PROGRAM

## 2024-10-07 PROCEDURE — 81001 URINALYSIS AUTO W/SCOPE: CPT

## 2024-10-07 RX ORDER — LIDOCAINE 50 MG/G
OINTMENT TOPICAL
Qty: 30 G | Refills: 0 | Status: SHIPPED | OUTPATIENT
Start: 2024-10-07

## 2024-10-07 NOTE — PROGRESS NOTES
Subjective:   Javier Aaron is a 78 year old female who presents for a Medicare Subsequent Annual Wellness visit (Pt already had Initial Annual Wellness) and scheduled follow up of multiple significant but stable problems.       History/Other:   Fall Risk Assessment:   She has been screened for Falls and is low risk.      Cognitive Assessment:   Abnormal  What day of the week is this?: Correct  What month is it?: Correct  What year is it?: Correct  Recall \"Ball\": Incorrect  Recall \"Flag\": Incorrect  Recall \"Tree\": Incorrect    Functional Ability/Status:   Javier Aaron has some abnormal functions as listed below:  She has Dressing and/or Bathing issues based on screening of functional status.  Difficulty dressing or bathing?: No  Bathing or Showering: Able without help  Dressing: Cannot do without help  She has Toileting difficulties based on screening of functional status.She has Eating difficulties based on screening of functional status.She has Driving difficulties based on screening of functional status. She has Hearing problems based on screening of functional status.She has Walking problems based on screening of functional status. She has problems with Memory based on screening of functional status.       Depression Screening (PHQ):  PHQ-2 SCORE: 0  , done 10/7/2024        10 minutes spent screening and counseling for depression    Advanced Directives:   She has a Living Will on file in "[employer] reviewed and discussed documents with patient (and family/surrogate if present).  She has a Power of  for Health Care on file in "[employer].  Discussed Advance Care Planning with patient (and family/surrogate if present). Standard forms made available to patient in After Visit Summary.      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    Current Medications:  Outpatient Medications Marked as Taking for the 10/7/24 encounter (Office Visit) with Da Rocha, DO   Medication Sig    lidocaine 5 % External Ointment -Apply thin layer to affected area up to three times daily as needed for pain. -Use gloves when you apply Lidocaine cream or gel so you do not make your hand numb. -If using on feet, be careful with walking because numb feet can cause you to trip.    mirtazapine 15 MG Oral Tab Take 1 tablet (15 mg total) by mouth nightly. TAKE AT BEDTIME    memantine 5 MG Oral Tab Take 1 tablet (5 mg total) by mouth daily.    zonisamide 100 MG Oral Cap Take 1 capsule (100 mg total) by mouth in the morning and 1 capsule (100 mg total) before bedtime.    pravastatin 40 MG Oral Tab Take 1 tablet (40 mg total) by mouth daily.    montelukast 10 MG Oral Tab Take 1 tablet (10 mg total) by mouth daily.    Trospium Chloride 20 MG Oral Tab Take 1 tablet (20 mg total) by mouth nightly.    Glucosamine-Chondroit-Vit C-Mn (GLUCOSAMINE 1500 COMPLEX OR) Take 2 capsules by mouth daily.    Multiple Vitamins-Minerals (MULTI FOR HER 50+) Oral Tab Take  by mouth.       Medical History:  She  has a past medical history of Asthma (HCC), Breast CA (HCC), Breast cancer, left (HCC), Cancer (HCC) ([place] 2006, bone femur 2016), Cataract, Encounter for chemotherapy management (06/01/2006), Extrinsic asthma, unspecified ([birth year]/1950), Hearing impairment, Hemorrhoids, History of Papanicolaou smear of cervix (12/12/2012), Radiation (10/01/2006),  and Seizure disorder (HCC).  Surgical History:  She  has a past surgical history that includes Breast lumpectomy (04/2006); breast surgery procedure unlisted (04/2006); breast surgery procedure unlisted (2011); hip replacement surgery; surgery - external (12/2017); colonoscopy (2010); lumpectomy left; chemotherapy; radiation left; and cataract (Right, 2022).   Family History:  Her family history includes Breast Cancer in her cousin; Breast Cancer (age of onset: 69) in her self; Diabetes in her maternal aunt, maternal uncle, and mother; Endocrine Disorder (age of onset: 62) in her mother; Hypertension in her mother; Polyps in her daughter.  Social History:  She  reports that she has quit smoking. Her smoking use included cigarettes. She has never used smokeless tobacco. She reports that she does not drink alcohol and does not use drugs.    Tobacco:  She smoked tobacco in the past but quit greater than 12 months ago.  Social History     Tobacco Use   Smoking Status Former    Types: Cigarettes   Smokeless Tobacco Never   Tobacco Comments    22 years ago         CAGE Alcohol Screen:   CAGE screening score of 0 on 10/7/2024, showing low risk of alcohol abuse.      Patient Care Team:  Da Rocha DO as PCP - General (Family Medicine)  Caryn Corona DO (NEUROLOGY)  Eri Graves CMA as [facility] Care Manager  Andrea Thornton Md (UROLOGY)  Ruma Blanca MD (OTOLARYNGOLOGY)  Mary Soto MD as Referring Physician (OPHTHALMOLOGY)  Gloria Stoll AUD (AUDIOLOGIST)  Dominic Manzo MD (Hematology and Oncology)  Dora Aldridge MD (GASTROENTEROLOGY)    Review of Systems  GENERAL: feels well otherwise  SKIN: denies any unusual skin lesions  EYES: denies blurred vision or double vision  HEENT: denies nasal congestion, sinus pain or ST  LUNGS: denies shortness of breath with exertion  CARDIOVASCULAR: denies chest pain on exertion  GI: denies abdominal pain, denies heartburn  : denies dysuria,  vaginal discharge or itching, no complaint of urinary incontinence   MUSCULOSKELETAL: denies back pain  NEURO: denies headaches  PSYCHE: denies depression or anxiety  HEMATOLOGIC: denies hx of anemia  ENDOCRINE: denies thyroid history  ALL/ASTHMA: denies hx of allergy or asthma    Objective:   Physical Exam  General Appearance:  Alert, cooperative, no distress, appears stated age   Head:  Normocephalic, without obvious abnormality, atraumatic   Eyes:  PERRL, conjunctiva/corneas clear, EOM's intact both eyes   Ears:  Normal TM's and external ear canals, both ears   Nose: Nares normal, septum midline,mucosa normal, no drainage or sinus tenderness   Throat: Lips, mucosa, and tongue normal; teeth and gums normal   Neck: Supple, symmetrical, trachea midline, no adenopathy;  thyroid: not enlarged, symmetric, no tenderness/mass/nodules; no carotid bruit or JVD   Back:   Symmetric, no curvature, ROM normal, no CVA tenderness   Lungs:   Clear to auscultation bilaterally, respirations unlabored   Heart:  Regular rate and rhythm, S1 and S2 normal, no murmur, rub, or gallop   Abdomen:   Soft, non-tender, bowel sounds active all four quadrants,  no masses, no organomegaly   Pelvic: Deferred   Extremities: Extremities normal, atraumatic, no cyanosis or edema   Pulses: 2+ and symmetric   Skin: Skin color, texture, turgor normal, no rashes or lesions   Lymph nodes: Cervical, supraclavicular, and axillary nodes normal   Neurologic: Normal       /81   Pulse 89   Resp 17   Ht 5' 1\" (1.549 m)   Wt 103 lb (46.7 kg)   BMI 19.46 kg/m²  Estimated body mass index is 19.46 kg/m² as calculated from the following:    Height as of this encounter: 5' 1\" (1.549 m).    Weight as of this encounter: 103 lb (46.7 kg).    Medicare Hearing Assessment:   Hearing Screening    Time taken: 10/7/2024  2:57 PM  Entry User: Sneha Mott CMA  Screening Method: Finger Rub  Finger Rub Result: Fail         Visual Acuity:   Right Eye Visual Acuity:  Uncorrected Right Eye Chart Acuity: 20/20   Left Eye Visual Acuity: Uncorrected Left Eye Chart Acuity: 20/20   Both Eyes Visual Acuity: Uncorrected Both Eyes Chart Acuity: 20/20   Able To Tolerate Visual Acuity: Yes        Assessment & Plan:   Javier Aaron is a 78 year old female who presents for a Medicare Assessment.     1. Medicare annual wellness visit, subsequent (Primary)  -     OPHTHALMOLOGY - INTERNAL  -     EKG 12 Lead; Future; Expected date: 10/07/2024  2. Primary hypertension  3. History of breast cancer  Overview:  12-17-14 Treated 2005 and still sees Dr Munguia at [place].  4. 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 NICHO (generalized anxiety disorder)  6. Mild intermittent asthma without complication (HCC)  7. Tortuous aorta (HCC)  8. Nonintractable generalized idiopathic epilepsy without status epilepticus (HCC)  9. Osteopenia, unspecified location  -     XR DEXA BONE DENSITOMETRY (CPT=77080); Future; Expected date: 10/07/2024  10. Postmenopausal  -     XR DEXA BONE DENSITOMETRY (CPT=77080); Future; Expected date: 10/07/2024  11. Weight loss  -     US ABDOMEN COMPLETE (CPT=76700); Future; Expected date: 10/07/2024  -     XR CHEST PA + LAT CHEST (CPT=71046); Future; Expected date: 10/07/2024  -     Urinalysis with Culture Reflex; Future; Expected date: 10/07/2024  Other orders  -     Lidocaine; -Apply thin layer to affected area up to three times daily as needed for pain. -Use gloves when you apply Lidocaine cream or gel so you do not make your hand numb. -If using on feet, be careful with walking because numb feet can cause you to trip.  Dispense: 30 g; Refill: 0  -     High Dose Fluzone trivalent influenza, 65yrs+ PFS (89406)    The patient indicates understanding of these issues and agrees to the plan.  Consult ordered.  Reinforced healthy diet, lifestyle, and exercise.      Return in about 4 weeks (around 11/4/2024).     Da Rocha DO, 10/7/2024     Supplementary  Documentation:   General Health:  In the past six months, have you lost more than 10 pounds without trying?: 3 - Don't know  Has your appetite been poor?: No  Type of Diet: Balanced  How does the patient maintain a good energy level?: Other  How would you describe your daily physical activity?: Moderate  How would you describe your current health state?: Good  How do you maintain positive mental well-being?: Visiting Family  On a scale of 0 to 10, with 0 being no pain and 10 being severe pain, what is your pain level?: 0 - (None)  In the past six months, have you experienced urine leakage?: 0-No  At any time do you feel concerned for the safety/well-being of yourself and/or your children, in your home or elsewhere?: No  Have you had any immunizations at another office such as Influenza, Hepatitis B, Tetanus, or Pneumococcal?: No    Health Maintenance   Topic Date Due    Zoster Vaccines (1 of 2) Never done    COVID-19 Vaccine (7 - 2023-24 season) 09/01/2024    Mammogram  02/19/2025    Annual Physical  10/07/2025    Influenza Vaccine  Completed    DEXA Scan  Completed    Annual Depression Screening  Completed    Fall Risk Screening (Annual)  Completed    Pneumococcal Vaccine: 65+ Years  Completed    Colorectal Cancer Screening  Discontinued     1. Medicare annual wellness visit, subsequent  Pharmacy for additional vaccines  - OPHTHALMOLOGY - INTERNAL  - EKG 12 Lead; Future    2. Primary hypertension  Controlled off medications    3. History of breast cancer  Follow-up with oncology Dr. Scales    4. 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 NICHO (generalized anxiety disorder)  Doing better on mirtazapine    6. Mild intermittent asthma without complication (HCC)  No difficulty breathing    7. Tortuous aorta (HCC)  Pravastatin prescription    8. Nonintractable generalized idiopathic epilepsy without status epilepticus (HCC)  No recent episodes currently on  zonisamide    9. Osteopenia, unspecified location  Bone density scan  - XR DEXA BONE DENSITOMETRY (CPT=80); Future    10. Postmenopausal  As above  - XR DEXA BONE DENSITOMETRY (CPT=05618); Future    11. Weight loss  Waiting on workup results for paresthesias also imaging studies ordered as below  - US ABDOMEN COMPLETE (CPT=76700); Future  - XR CHEST PA + LAT CHEST (CPT=71046); Future  - Urinalysis with Culture Reflex; Future

## 2024-10-08 LAB
ANTI-HU AB: NEGATIVE
ANTI-RI AB: NEGATIVE
ANTI-YO AB: NEGATIVE
ATRIAL RATE: 85 BPM
P AXIS: 39 DEGREES
P-R INTERVAL: 146 MS
Q-T INTERVAL: 374 MS
QRS DURATION: 88 MS
QTC CALCULATION (BEZET): 445 MS
R AXIS: 73 DEGREES
T AXIS: 19 DEGREES
VENTRICULAR RATE: 85 BPM
VITAMIN B1 WHOLE BLD: 91.8 NMOL/L

## 2024-10-09 ENCOUNTER — MED REC SCAN ONLY (OUTPATIENT)
Dept: NEUROLOGY | Facility: CLINIC | Age: 79
End: 2024-10-09

## 2024-10-12 LAB — PTH-RELATED PEPTIDE: <2 PMOL/L

## 2024-10-13 LAB
VIT E ALPHA TOCO: 12.2 MG/L
VIT E GAMMA TOCO: 0.3 MG/L
VITAMIN B6: 19.6 UG/L

## 2024-10-14 ENCOUNTER — TELEPHONE (OUTPATIENT)
Dept: FAMILY MEDICINE CLINIC | Facility: CLINIC | Age: 79
End: 2024-10-14

## 2024-10-14 RX ORDER — LIDOCAINE 50 MG/G
OINTMENT TOPICAL
Qty: 35 G | Refills: 0 | Status: SHIPPED | OUTPATIENT
Start: 2024-10-14

## 2024-10-14 RX ORDER — LIDOCAINE 50 MG/G
OINTMENT TOPICAL
Qty: 50 G | Refills: 0 | Status: SHIPPED | OUTPATIENT
Start: 2024-10-14 | End: 2024-10-14

## 2024-10-14 NOTE — TELEPHONE ENCOUNTER
[employer] calling to verify quantity to process appeal, advised 35g. No further questions at this time.

## 2024-10-14 NOTE — TELEPHONE ENCOUNTER
Please provide diagnosis    Other orders  -     Lidocaine; -Apply thin layer to affected area up to three times daily as needed for pain. -Use gloves when you apply Lidocaine cream or gel so you do not make your hand numb. -If using on feet, be careful with walking because numb feet can cause you to trip.  Dispense: 30 g; Refill: 0

## 2024-10-14 NOTE — TELEPHONE ENCOUNTER
[phone] GM     Fax chart notes [phone]   Member ID     Prior auth appeal requested  Await outcome

## 2024-10-14 NOTE — TELEPHONE ENCOUNTER
Prior authorization needed for Lidocaine 5% topical oint 35.44GM.    Message: Plan does not cover this medication. Please call plan at [phone] to initiate prior auth call/fa pharmacy to change medication. Pt Id is: [id number]

## 2024-10-15 LAB — METHYLMALONIC ACID: 140 NMOL/L

## 2024-10-15 NOTE — TELEPHONE ENCOUNTER
[employer] called and stated Lidocaine is denied, letter will be sent to us.  Case # [id number].  Please advise

## 2024-10-16 ENCOUNTER — TELEPHONE (OUTPATIENT)
Dept: FAMILY MEDICINE CLINIC | Facility: CLINIC | Age: 79
End: 2024-10-16

## 2024-10-16 NOTE — TELEPHONE ENCOUNTER
Plan does not cover this medication. Please call plan [phone] to initiate prior authorization please call pharmacy to change medication    Lidocaine 5% Topical ointment 50 MG

## 2024-10-20 ENCOUNTER — HOSPITAL ENCOUNTER (OUTPATIENT)
Dept: ULTRASOUND IMAGING | Facility: HOSPITAL | Age: 79
End: 2024-10-20
Attending: FAMILY MEDICINE
Payer: MEDICARE

## 2024-10-20 ENCOUNTER — HOSPITAL ENCOUNTER (OUTPATIENT)
Dept: ULTRASOUND IMAGING | Facility: HOSPITAL | Age: 79
Discharge: HOME | End: 2024-10-20
Attending: FAMILY MEDICINE
Payer: MEDICARE

## 2024-10-20 DIAGNOSIS — R63.4 WEIGHT LOSS: ICD-10-CM

## 2024-10-20 PROCEDURE — 76700 US EXAM ABDOM COMPLETE: CPT | Performed by: FAMILY MEDICINE

## 2024-10-25 ENCOUNTER — PATIENT OUTREACH (OUTPATIENT)
Dept: CASE MANAGEMENT | Age: 79
End: 2024-10-25

## 2024-10-25 NOTE — PROGRESS NOTES
Contacting patient to follow up on CCM for the month, no answer.     Care everywhere utilized to update chart. Reviewed pt's chart including recent visits.      Pt is due for:   Health Maintenance   Topic Date Due    Zoster Vaccines (1 of 2) Never done    COVID-19 Vaccine (7 - 2023-24 season) 09/01/2024    Mammogram  02/19/2025    Annual Physical  10/07/2025    Influenza Vaccine  Completed    DEXA Scan  Completed    Annual Depression Screening  Completed    Fall Risk Screening (Annual)  Completed    Pneumococcal Vaccine: 65+ Years  Completed    Colorectal Cancer Screening  Discontinued       Future Appointments   Date Time Provider Department Center   11/7/2024  2:30 PM Caryn Corona DO [facility] [place] [facility]   11/8/2024  4:10 PM [facility] [place]   2/24/2025 12:00 PM [address]   8/25/2025  3:30 PM Dominic Manzo MD [facility]EM ONC [facility]        Total time - 3 min  Total Monthly time- 3 min

## 2024-10-28 RX ORDER — LEVOCETIRIZINE DIHYDROCHLORIDE 5 MG/1
5 TABLET, FILM COATED ORAL EVERY EVENING
Qty: 90 TABLET | Refills: 3 | OUTPATIENT
Start: 2024-10-28

## 2024-10-28 NOTE — TELEPHONE ENCOUNTER
Refill request denied.    [employer] message sent.    The original prescription was discontinued on 8/2/2024 by Da Rocha DO. Renewing this prescription may not be appropriate.

## 2024-11-01 ENCOUNTER — TELEPHONE (OUTPATIENT)
Dept: FAMILY MEDICINE CLINIC | Facility: CLINIC | Age: 79
End: 2024-11-01

## 2024-11-08 ENCOUNTER — PATIENT OUTREACH (OUTPATIENT)
Dept: CASE MANAGEMENT | Age: 79
End: 2024-11-08

## 2024-11-08 ENCOUNTER — OFFICE VISIT (OUTPATIENT)
Dept: FAMILY MEDICINE CLINIC | Facility: CLINIC | Age: 79
End: 2024-11-08
Payer: MEDICARE

## 2024-11-08 ENCOUNTER — LAB ENCOUNTER (OUTPATIENT)
Dept: LAB | Age: 79
End: 2024-11-08
Attending: FAMILY MEDICINE
Payer: MEDICARE

## 2024-11-08 VITALS
HEIGHT: 61 IN | HEART RATE: 76 BPM | DIASTOLIC BLOOD PRESSURE: 84 MMHG | BODY MASS INDEX: 19.45 KG/M2 | WEIGHT: 103 LBS | SYSTOLIC BLOOD PRESSURE: 129 MMHG

## 2024-11-08 DIAGNOSIS — M89.9 DISORDER OF BONE AND CARTILAGE: ICD-10-CM

## 2024-11-08 DIAGNOSIS — F41.1 GAD (GENERALIZED ANXIETY DISORDER): ICD-10-CM

## 2024-11-08 DIAGNOSIS — M94.9 DISORDER OF BONE AND CARTILAGE: ICD-10-CM

## 2024-11-08 DIAGNOSIS — H25.13 AGE-RELATED NUCLEAR CATARACT OF BOTH EYES: ICD-10-CM

## 2024-11-08 DIAGNOSIS — E83.52 HYPERCALCEMIA: ICD-10-CM

## 2024-11-08 DIAGNOSIS — F32.1 CURRENT MODERATE EPISODE OF MAJOR DEPRESSIVE DISORDER, UNSPECIFIED WHETHER RECURRENT (HCC): ICD-10-CM

## 2024-11-08 DIAGNOSIS — K80.20 CALCULUS OF GALLBLADDER WITHOUT CHOLECYSTITIS WITHOUT OBSTRUCTION: Primary | ICD-10-CM

## 2024-11-08 DIAGNOSIS — G40.309 NONINTRACTABLE GENERALIZED IDIOPATHIC EPILEPSY WITHOUT STATUS EPILEPTICUS (HCC): ICD-10-CM

## 2024-11-08 DIAGNOSIS — I10 PRIMARY HYPERTENSION: Primary | ICD-10-CM

## 2024-11-08 LAB
ANION GAP SERPL CALC-SCNC: 8 MMOL/L (ref 0–18)
BUN BLD-MCNC: 11 MG/DL (ref 9–23)
BUN/CREAT SERPL: 16.7 (ref 10–20)
CALCIUM BLD-MCNC: 10.7 MG/DL (ref 8.7–10.4)
CHLORIDE SERPL-SCNC: 111 MMOL/L (ref 98–112)
CO2 SERPL-SCNC: 27 MMOL/L (ref 21–32)
CREAT BLD-MCNC: 0.66 MG/DL
EGFRCR SERPLBLD CKD-EPI 2021: 90 ML/MIN/1.73M2 (ref 60–?)
FASTING STATUS PATIENT QL REPORTED: NO
GLUCOSE BLD-MCNC: 97 MG/DL (ref 70–99)
OSMOLALITY SERPL CALC.SUM OF ELEC: 301 MOSM/KG (ref 275–295)
POTASSIUM SERPL-SCNC: 3.7 MMOL/L (ref 3.5–5.1)
SODIUM SERPL-SCNC: 146 MMOL/L (ref 136–145)

## 2024-11-08 PROCEDURE — 36415 COLL VENOUS BLD VENIPUNCTURE: CPT

## 2024-11-08 PROCEDURE — 80048 BASIC METABOLIC PNL TOTAL CA: CPT

## 2024-11-08 PROCEDURE — 99213 OFFICE O/P EST LOW 20 MIN: CPT | Performed by: FAMILY MEDICINE

## 2024-11-08 RX ORDER — LEVOCETIRIZINE DIHYDROCHLORIDE 5 MG/1
5 TABLET, FILM COATED ORAL EVERY EVENING
Qty: 90 TABLET | Refills: 3 | Status: SHIPPED | OUTPATIENT
Start: 2024-11-08

## 2024-11-08 NOTE — PROGRESS NOTES
11/8/2024  Spoke to Javier for [facility].      Updates to patient care team/ comments: UTD  Patient reported changes in medications: (Removed Lidocaine)  Med Adherence  Comment: Taking as prescribed    Health Maintenance:  Reviewed with patient.   Health Maintenance   Topic Date Due    Zoster Vaccines (1 of 2) Never done    COVID-19 Vaccine (7 - 2023-24 season) 09/01/2024    Mammogram  02/19/2025    Annual Physical  10/07/2025    Influenza Vaccine  Completed    DEXA Scan  Completed    Annual Depression Screening  Completed    Fall Risk Screening (Annual)  Completed    Pneumococcal Vaccine: 65+ Years  Completed    Colorectal Cancer Screening  Discontinued       Patient current concerns: Spoke with the patient, who reports being seen by her PCP, Dr. Rocha, for her Medicare physical on 10/07/24. During the visit, weight loss was discussed, and the patient was instructed to complete an ultrasound of her abdomen, which showed a gallstone. Patient denies any abdominal pain and has an appointment today to discuss the results with her doctor. Patient expressed concern due to her mother's passing at the age of 61 from complications related to gallstones. As a result, she would like to consult with a surgeon to discuss the possibility of surgery.    Impression   CONCLUSION:  1. Normal hepatic sonogram.  No focal hepatic lesions.  2. Cholelithiasis without wall thickening or pericholecystic fluid.  Negative sonographic Valle sign.  3. No biliary dilatation.  Normal caliber common duct.  4. Visualized portions pancreas unremarkable.  Distal pancreatic tail obscured by bowel  5. Small amount of free fluid the pattern renal fossa.    6. Normal bilateral renal sonogram without hydronephrosis.      Dictated by (CST): Da Feldman MD on 10/21/2024 at 12:50 PM      Patient continues to receive home health services, a nurse visits every two weeks.   Patient reports being compliant with her medication regimen, states her daughter  manages and sorts her medications. Patient denies recent falls, she does have gait issues which she uses a walker to ambulate. Patient lives with her  in a condo, she has assistance from her children with appointments, medications, and grocery shopping.   Goals/Action Plan:   Active goal from previous outreach: work on in-home stretches   Patient reported progress towards goals: no progress             - What: work on in-home stretches           - Where/When/How: work on daily stretches  Patient Reported Barriers and Concerns: no barriers reported                    - Plan for overcoming barriers: N/A  Care managers interventions: Reviewed care team, health maintenance, and medication list. Briefly reviewed test results with the patient. Continued to provide education on how to better manage and cope with chronic conditions. Informed of the importance of reporting any new symptoms to prevent any health complications.       Appointments reviewed with patient.   Future Appointments   Date Time Provider Department Center   11/8/2024  4:10 PM [facility] [place]   11/20/2024  9:00 AM Caryn Corona DO [facility] [place] [facility]   2/24/2025 12:00 PM [facility]   8/25/2025  3:30 PM Dominic Manzo MD [facility] HEM ONC E[facility]        Next Care Manager Follow Up Date: One month    Reason For Follow Up: review progress and or barriers towards patient's goals.     Time Spent This Encounter Total: 24 min medical record review, telephone communication, care plan updates where needed, education, goals, and action plan recreation/update. Provided acknowledgment and validation to patient's concerns.   Monthly Minute Total including today: 24 min  Physical assessment, complete health history, and need for CCM established by Da Rocha DO.

## 2024-11-08 NOTE — PROGRESS NOTES
Blood pressure 129/84, pulse 76, height 5' 1\" (1.549 m), weight 103 lb (46.7 kg).          Following up for weight loss.  Has been holding her weight does drink Ensure at times.  Concerned because her mother had pancreatitis from gallstones and  of very painful death and she saw the gallstone in the gallbladder.    She is very stressed about her  with the Alzheimer's disease.        Objective weight is stable    103 pounds    Assessment weight loss is stable history of hypercalcemia    Plan blood test ordered follow-up in 2 months

## 2024-11-18 ENCOUNTER — TELEPHONE (OUTPATIENT)
Dept: FAMILY MEDICINE CLINIC | Facility: CLINIC | Age: 79
End: 2024-11-18

## 2024-11-18 NOTE — TELEPHONE ENCOUNTER
Residential  Plan of Care orders received, signed by provider and faxed back successfully.    Placed in HH folder with scan sheet.

## 2024-11-20 ENCOUNTER — OFFICE VISIT (OUTPATIENT)
Dept: NEUROLOGY | Facility: CLINIC | Age: 79
End: 2024-11-20
Payer: MEDICARE

## 2024-11-20 VITALS
HEIGHT: 61 IN | SYSTOLIC BLOOD PRESSURE: 118 MMHG | HEART RATE: 88 BPM | WEIGHT: 112 LBS | BODY MASS INDEX: 21.14 KG/M2 | DIASTOLIC BLOOD PRESSURE: 72 MMHG

## 2024-11-20 DIAGNOSIS — G40.802 EPILEPSY WITH BOTH GENERALIZED AND FOCAL FEATURES (HCC): ICD-10-CM

## 2024-11-20 DIAGNOSIS — F32.A ANXIETY AND DEPRESSION: ICD-10-CM

## 2024-11-20 DIAGNOSIS — G62.9 LENGTH-DEPENDENT PERIPHERAL NEUROPATHY: Primary | ICD-10-CM

## 2024-11-20 DIAGNOSIS — F41.9 ANXIETY AND DEPRESSION: ICD-10-CM

## 2024-11-20 PROCEDURE — 99214 OFFICE O/P EST MOD 30 MIN: CPT | Performed by: OTHER

## 2024-11-20 RX ORDER — MIRTAZAPINE 15 MG/1
15 TABLET, FILM COATED ORAL NIGHTLY
Qty: 90 TABLET | Refills: 3 | Status: SHIPPED | OUTPATIENT
Start: 2024-11-20 | End: 2025-11-15

## 2024-11-20 NOTE — PROGRESS NOTES
[facility]  [address], [address]  [facility] [address]  [phone]          Established  Follow Up Visit       Date: November 20, 2024  Patient Name: Javier Aaron   MRN: [id number]  Primary physician: [address]  [place] [address]    Interval History:   -- Patient presents for follow-up. Right foot neuropathy-related tingling in her toes and ball of foot is stable, noticeable at all times.  Does not bother her overtly.  Feels left lower extremity symptoms are well controlled.      -- Discussed  with dementia (also my patient) re: clinical update; when to escalate care.  He is stable, no safety issues.  He is without significant apraxia, able to carry out ADLs with reminders like grooming, taking out garbage etc.  Less engaged overall but calm, watches TV.  As a younger man he was more strict.      -- Son is present for appointment and helps provide history       OUTPATIENT MEDICATIONS  Medications Ordered Prior to Encounter[1]      PHYSICAL EXAM:   /72   Pulse 88   Ht 61\"   Wt 112 lb (50.8 kg)   BMI 21.16 kg/m²   General appearance: Well appearing, and in no acute distress  Skin: skin color, texture normal.  No rashes or lesions.    Head: Normocephalic, atraumatic.    Neurological exam:    Mental Status:   Attention/Concentration: intact attention on bedside test   Fund of knowledge: intact  Speech: no dysarthria or aphasia     LABS/DATA:  10/3/2024 vitamin EE, vitamin B6, vitamin B1, methylmalonic acid, anti-Hu antibodies, ganglionic acetylcholine receptor antibodies, folic acid levels all normal      IMAGING:  N/A    ASSESSMENT:  The patient is a 79 year old  woman with past medical history of ocular migraines, left-sided breast cancer status post lumpectomy lymph node dissection and chemotherapy/radiation 2006 complicated by  chemotherapy-induced peripheral neuropathy, allergic rhinitis and asthma, anxiety who presents for follow up.        Her  neurological examination was significant for length dependent peripheral neuropathy.      -CT brain 1/2021: Mild cortical atrophy and chronic microvascular ischemic changes  -MRI brain [facility] 9/12/2021: chronic microvascular ischemic changes   -Negative/normal, monoclonal proteins, B12 and MMA, folic acid, TSH, B1 and B6, vitamin E   --EEG 11/2021 - normal   --CT brain 4/2023 - no acute findings   -Normal/negative vitamin E, ammonia, folic acid, vitamin B12/MMA, vitamin B6 and vitamin B1 levels, anti-Hu antibodies, ganglionic acetylcholine receptor antibodies     Focal and generalized epilepsy has not been able to tolerate Keppra or Lamotrigine.  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  Flare up of symptoms - may be stress-related.  Updated lab work was stable.  --We discussed alpha lipoic acid 600 mg daily, she would like to try   --Weighted blanket on legs only  --Discussed topical magnesium, lidocaine and capsaicin, she prefers to hold off on these   --Podiatry yearly evaluation    Follow up: 3 months       Discussed indication, administration, dose, and side effects with patient of any medications personally prescribed. Patient was advised to let my office know if they have any questions or concerns.       Today, I personally spent 20 minutes in this case, including chart review, time spent with patient doing face to face evaluation w/ interview and exam and patient education, counseling, and time was spent in patient education, counseling, and coordination of care as described above.   Issues discussed: Diagnosis and implications on  future health, benefits and side effects of present and future medications, test results as well as further testing and medications required.    This note was prepared using Dragon Medical voice recognition dictation software and as a result, errors may occur. When identified, these errors have been corrected. While every attempt is made to correct errors during dictation, discrepancies may still exist    JANE Corona DO   Staff Physician, Neurology   11/20/2024  9:09 AM               [1]   Current Outpatient Medications on File Prior to Visit   Medication Sig Dispense Refill    levocetirizine 5 MG Oral Tab Take 1 tablet (5 mg total) by mouth every evening. 90 tablet 3    mirtazapine 15 MG Oral Tab Take 1 tablet (15 mg total) by mouth nightly. TAKE AT BEDTIME      memantine 5 MG Oral Tab Take 1 tablet (5 mg total) by mouth daily. 90 tablet 3    zonisamide 100 MG Oral Cap Take 1 capsule (100 mg total) by mouth in the morning and 1 capsule (100 mg total) before bedtime. 180 capsule 3    pravastatin 40 MG Oral Tab Take 1 tablet (40 mg total) by mouth daily. 90 tablet 3    montelukast 10 MG Oral Tab Take 1 tablet (10 mg total) by mouth daily. 90 tablet 3    Trospium Chloride 20 MG Oral Tab Take 1 tablet (20 mg total) by mouth nightly.      Glucosamine-Chondroit-Vit C-Mn (GLUCOSAMINE 1500 COMPLEX OR) Take 2 capsules by mouth daily.      Multiple Vitamins-Minerals (MULTI FOR HER 50+) Oral Tab Take  by mouth.       No current facility-administered medications on file prior to visit.

## 2024-11-20 NOTE — PATIENT INSTRUCTIONS
Alpha lipoic acid 600 mg daily for nerve health    Weighted blanket (on legs only - don't use on your chest or abdomen while sleeping)    Other considerations:  Over the counter magnesium cream     Topical Lidocaine (4% is over the counter)   Topical Capsaicin (needs a prescription - let me know if interested)

## 2024-11-27 ENCOUNTER — APPOINTMENT (OUTPATIENT)
Dept: GENERAL RADIOLOGY | Facility: HOSPITAL | Age: 79
End: 2024-11-27
Attending: STUDENT IN AN ORGANIZED HEALTH CARE EDUCATION/TRAINING PROGRAM
Payer: MEDICARE

## 2024-11-27 ENCOUNTER — HOSPITAL ENCOUNTER (OUTPATIENT)
Age: 79
Discharge: ANOTHER HOSPITAL | End: 2024-11-27
Attending: EMERGENCY MEDICINE
Payer: MEDICARE

## 2024-11-27 ENCOUNTER — HOSPITAL ENCOUNTER (EMERGENCY)
Facility: HOSPITAL | Age: 79
Discharge: HOME | End: 2024-11-27
Attending: STUDENT IN AN ORGANIZED HEALTH CARE EDUCATION/TRAINING PROGRAM
Payer: MEDICARE

## 2024-11-27 VITALS
TEMPERATURE: 98 F | RESPIRATION RATE: 17 BRPM | HEART RATE: 96 BPM | SYSTOLIC BLOOD PRESSURE: 131 MMHG | OXYGEN SATURATION: 100 % | DIASTOLIC BLOOD PRESSURE: 67 MMHG

## 2024-11-27 VITALS
RESPIRATION RATE: 21 BRPM | TEMPERATURE: 98 F | SYSTOLIC BLOOD PRESSURE: 138 MMHG | HEIGHT: 61 IN | DIASTOLIC BLOOD PRESSURE: 71 MMHG | HEART RATE: 89 BPM | BODY MASS INDEX: 19.45 KG/M2 | OXYGEN SATURATION: 94 % | WEIGHT: 103 LBS

## 2024-11-27 DIAGNOSIS — I95.9 HYPOTENSION, UNSPECIFIED HYPOTENSION TYPE: ICD-10-CM

## 2024-11-27 DIAGNOSIS — R55 SYNCOPE AND COLLAPSE: Primary | ICD-10-CM

## 2024-11-27 DIAGNOSIS — R55 SYNCOPE, VASOVAGAL: Primary | ICD-10-CM

## 2024-11-27 LAB
ALBUMIN SERPL-MCNC: 4.8 G/DL (ref 3.2–4.8)
ALBUMIN/GLOB SERPL: 2.3 {RATIO} (ref 1–2)
ALP LIVER SERPL-CCNC: 90 U/L
ALT SERPL-CCNC: 20 U/L
ANION GAP SERPL CALC-SCNC: 9 MMOL/L (ref 0–18)
AST SERPL-CCNC: 24 U/L (ref ?–34)
ATRIAL RATE: 87 BPM
ATRIAL RATE: 89 BPM
B-HCG UR QL: NEGATIVE
BASOPHILS # BLD AUTO: 0.02 X10(3) UL (ref 0–0.2)
BASOPHILS NFR BLD AUTO: 0.3 %
BILIRUB SERPL-MCNC: 1 MG/DL (ref 0.2–1.1)
BILIRUB UR QL: NEGATIVE
BUN BLD-MCNC: 11 MG/DL (ref 9–23)
BUN/CREAT SERPL: 14.5 (ref 10–20)
CALCIUM BLD-MCNC: 10.9 MG/DL (ref 8.7–10.4)
CHLORIDE SERPL-SCNC: 110 MMOL/L (ref 98–112)
CLARITY UR: CLEAR
CO2 SERPL-SCNC: 25 MMOL/L (ref 21–32)
COLOR UR: YELLOW
CREAT BLD-MCNC: 0.76 MG/DL
DEPRECATED RDW RBC AUTO: 45.3 FL (ref 35.1–46.3)
EGFRCR SERPLBLD CKD-EPI 2021: 81 ML/MIN/1.73M2 (ref 60–?)
EOSINOPHIL # BLD AUTO: 0.07 X10(3) UL (ref 0–0.7)
EOSINOPHIL NFR BLD AUTO: 1.1 %
ERYTHROCYTE [DISTWIDTH] IN BLOOD BY AUTOMATED COUNT: 13.1 % (ref 11–15)
GLOBULIN PLAS-MCNC: 2.1 G/DL (ref 2–3.5)
GLUCOSE BLD-MCNC: 103 MG/DL (ref 70–99)
GLUCOSE BLDC GLUCOMTR-MCNC: 106 MG/DL (ref 70–99)
GLUCOSE UR-MCNC: NORMAL MG/DL
HCT VFR BLD AUTO: 42.9 %
HGB BLD-MCNC: 14.3 G/DL
HGB UR QL STRIP.AUTO: NEGATIVE
IMM GRANULOCYTES # BLD AUTO: 0.02 X10(3) UL (ref 0–1)
IMM GRANULOCYTES NFR BLD: 0.3 %
LEUKOCYTE ESTERASE UR QL STRIP.AUTO: NEGATIVE
LYMPHOCYTES # BLD AUTO: 0.67 X10(3) UL (ref 1–4)
LYMPHOCYTES NFR BLD AUTO: 10.5 %
MCH RBC QN AUTO: 31.6 PG (ref 26–34)
MCHC RBC AUTO-ENTMCNC: 33.3 G/DL (ref 31–37)
MCV RBC AUTO: 94.9 FL
MONOCYTES # BLD AUTO: 0.45 X10(3) UL (ref 0.1–1)
MONOCYTES NFR BLD AUTO: 7 %
NEUTROPHILS # BLD AUTO: 5.16 X10 (3) UL (ref 1.5–7.7)
NEUTROPHILS # BLD AUTO: 5.16 X10(3) UL (ref 1.5–7.7)
NEUTROPHILS NFR BLD AUTO: 80.8 %
NITRITE UR QL STRIP.AUTO: NEGATIVE
OSMOLALITY SERPL CALC.SUM OF ELEC: 298 MOSM/KG (ref 275–295)
P AXIS: 66 DEGREES
P AXIS: 76 DEGREES
P-R INTERVAL: 148 MS
P-R INTERVAL: 154 MS
PH UR: 5.5 [PH] (ref 5–8)
PLATELET # BLD AUTO: 189 10(3)UL (ref 150–450)
POTASSIUM SERPL-SCNC: 3.7 MMOL/L (ref 3.5–5.1)
PROT SERPL-MCNC: 6.9 G/DL (ref 5.7–8.2)
Q-T INTERVAL: 354 MS
Q-T INTERVAL: 376 MS
QRS DURATION: 86 MS
QRS DURATION: 90 MS
QTC CALCULATION (BEZET): 430 MS
QTC CALCULATION (BEZET): 452 MS
R AXIS: 71 DEGREES
R AXIS: 79 DEGREES
RBC # BLD AUTO: 4.52 X10(6)UL
SODIUM SERPL-SCNC: 144 MMOL/L (ref 136–145)
SP GR UR STRIP: 1.02 (ref 1–1.03)
T AXIS: -1 DEGREES
T AXIS: 11 DEGREES
TROPONIN I SERPL HS-MCNC: <3 NG/L
UROBILINOGEN UR STRIP-ACNC: 2
VENTRICULAR RATE: 87 BPM
VENTRICULAR RATE: 89 BPM
WBC # BLD AUTO: 6.4 X10(3) UL (ref 4–11)

## 2024-11-27 PROCEDURE — 84484 ASSAY OF TROPONIN QUANT: CPT | Performed by: STUDENT IN AN ORGANIZED HEALTH CARE EDUCATION/TRAINING PROGRAM

## 2024-11-27 PROCEDURE — 71045 X-RAY EXAM CHEST 1 VIEW: CPT | Performed by: STUDENT IN AN ORGANIZED HEALTH CARE EDUCATION/TRAINING PROGRAM

## 2024-11-27 PROCEDURE — 80053 COMPREHEN METABOLIC PANEL: CPT | Performed by: STUDENT IN AN ORGANIZED HEALTH CARE EDUCATION/TRAINING PROGRAM

## 2024-11-27 PROCEDURE — 81003 URINALYSIS AUTO W/O SCOPE: CPT | Performed by: STUDENT IN AN ORGANIZED HEALTH CARE EDUCATION/TRAINING PROGRAM

## 2024-11-27 PROCEDURE — 99284 EMERGENCY DEPT VISIT MOD MDM: CPT

## 2024-11-27 PROCEDURE — 85025 COMPLETE CBC W/AUTO DIFF WBC: CPT | Performed by: STUDENT IN AN ORGANIZED HEALTH CARE EDUCATION/TRAINING PROGRAM

## 2024-11-27 PROCEDURE — 82962 GLUCOSE BLOOD TEST: CPT

## 2024-11-27 PROCEDURE — 36415 COLL VENOUS BLD VENIPUNCTURE: CPT

## 2024-11-27 PROCEDURE — 93010 ELECTROCARDIOGRAM REPORT: CPT

## 2024-11-27 PROCEDURE — 99214 OFFICE O/P EST MOD 30 MIN: CPT

## 2024-11-27 PROCEDURE — 81025 URINE PREGNANCY TEST: CPT

## 2024-11-27 PROCEDURE — 93005 ELECTROCARDIOGRAM TRACING: CPT

## 2024-11-27 PROCEDURE — 99285 EMERGENCY DEPT VISIT HI MDM: CPT

## 2024-11-27 NOTE — ED INITIAL ASSESSMENT (HPI)
States she felt dizzy and lightheaded while in her kitchen and said had a syncopal episode, pt stayed sitting ,no fall , pt denies h/a, no cp,speech clear, no facial droop, per daughter, pt had low bp readings after this episode

## 2024-11-27 NOTE — ED PROVIDER NOTES
Patient Seen in: [facility] [place]      History     Chief Complaint   Patient presents with    Syncope     Stated Complaint: fainted, b/p issue    Subjective:   HPI      Patient is a 79-year-old female with past history of breast cancer, seizure disorder who presents now after syncopal episode.  The patient states that she was at the kitchen counter when she began to feel lightheaded.  The patient has a rolling walker.  Patient went to get some Ensure and sat down on a rolling walker.  Patient states she then \"passed out\".  The patient did not fall out of her rolling walker and remained in the seated position.  However the anterior bottle was on the floor when the patient awoke.  The patient denies any focal numbness or tingling.  The patient denies any chest pain or shortness of breath.  The patient's daughter states that she was called by her mother and came to evaluate her.  Daughter states that she did multiple blood pressures on the patient which were in the 80s to 85 range.  Patient is now awake, alert and oriented.  The patient denies any headache.  Patient states she had similar episodes in the past that ultimately were determined to be seizures.    Objective:     Past Medical History:    Asthma (HCC)    asthma as a child    Breast CA (HCC)    Breast cancer, left (HCC)    PER nh: LUMPECTOMY 04/2006'; LYMPH NODE LEFT; LT LYMPH NODE DISSECTION, 6 out of 10    Cancer (HCC)    Cataract    Encounter for chemotherapy management    per NG: chemo for breast cancer    Extrinsic asthma, unspecified    Hearing impairment    bilateral hearing aids    Hemorrhoids    History of Papanicolaou smear of cervix    DONE [place] NG    Radiation    per NG: Lt breast radiation /Dr Champagne    Seizure disorder (HCC)    1 time event- taking meds for prevention              Past Surgical History:   Procedure Laterality Date    Breast lumpectomy  04/2006    PER NG: BREAST CANCER LEFT SIDE SEES ONCOLOGY AT [facility] FOR ANNUAL MAMMO    Breast  surgery procedure unlisted  04/2006    per NG: Lt lymph node disection. 6 out of 10 affected YEARLY MAMMOGRAMS AT [facility]    Breast surgery procedure unlisted  2011    per NG:right breast calcification removal    Cataract Right 2022    Chemotherapy      Colonoscopy  2010    Hip replacement surgery      AND REVISION 2016    Lumpectomy left      Radiation left      Surgery - external  12/2017    LEFT FEMUR CANCER HARDWARE/MARLI PLACED                 Social History     Socioeconomic History    Marital status:    Tobacco Use    Smoking status: Former     Types: Cigarettes    Smokeless tobacco: Never    Tobacco comments:     22 years ago    Vaping Use    Vaping status: Never Used   Substance and Sexual Activity    Alcohol use: No    Drug use: Never   Other Topics Concern    Caffeine Concern Yes     Comment: per NG: chocolate; tea, 2 cups daily    Reaction to local anesthetic No    Pt has a pacemaker No    Pt has a defibrillator No     Social Drivers of Health     Financial Resource Strain: Low Risk  (6/19/2024)    Financial Resource Strain     Difficulty of Paying Living Expenses: Not hard at all     Med Affordability: No   Food Insecurity: No Food Insecurity (1/17/2024)    Food Insecurity     Food Insecurity: Never true   Transportation Needs: No Transportation Needs (1/17/2024)    Transportation Needs     Lack of Transportation: No   Physical Activity: Inactive (1/17/2024)    Exercise Vital Sign     Days of Exercise per Week: 0 days     Minutes of Exercise per Session: 0 min   Stress: No Stress Concern Present (1/17/2024)    Stress     Feeling of Stress : No   Social Connections: Socially Integrated (1/17/2024)    Social Connections     Frequency of Socialization with Friends and Family: 3   Housing Stability: Low Risk  (1/17/2024)    Housing Stability     Housing Instability: No              Review of Systems    Positive for stated complaint: fainted, b/p issue  Other systems are as noted in HPI.  Constitutional  and vital signs reviewed.      All other systems reviewed and negative except as noted above.    Physical Exam     ED Triage Vitals [11/27/24 1408]   /67   Pulse 96   Resp 17   Temp 97.5 °F (36.4 °C)   Temp src Oral   SpO2 100 %   O2 Device None (Room air)       Current Vitals:   Vital Signs  BP: 131/67  Pulse: 96  Resp: 17  Temp: 97.5 °F (36.4 °C)  Temp src: Oral    Oxygen Therapy  SpO2: 100 %  O2 Device: None (Room air)        Physical Exam    Constitutional: Well-developed well-nourished in no acute distress  Head: Normocephalic, no swelling or tenderness  Eyes: Nonicteric sclera, no conjunctival injection  ENT: TMs are clear and flat bilaterally.  There is no posterior pharyngeal erythema  Chest: Clear to auscultation, no tenderness  Cardiovascular: Regular rate and rhythm with 4/6 systolic ejection murmur  Abdomen: Soft, nontender and nondistended  Neurologic: Patient is awake, alert and oriented ×3.  The patient's motor strength is 5 out of 5 and symmetric in the upper and lower extremities bilaterally  Extremities: No focal swelling or tenderness  Skin: No pallor, no redness or warmth to the touch      ED Course   Labs Reviewed - No data to display  EKG    Rate, intervals and axes as noted on EKG Report.  Rate: 89  Rhythm: Sinus Rhythm  Reading: Patient's EKG demonstrates a normal sinus rhythm with a rate of 89.  Patient's axis and intervals are normal.  There is diffuse ST depression.  Compared to an EKG done 10/7/2024, there are no acute changes         Patient's Accu-Chek was 106.   Due to history of what sounds like a true syncopal episode associated with hypotension, recommend further evaluation in the emergency room.  Patient's family states that they are comfortable driving her to the [facility] emergency room now.    Patient's heart rate is 96 in triage vitals.  The patient's blood pressure was 130/67.       MDM      Syncope, cardiac versus metabolic versus neurologic in etiology        Medical  Decision Making      Disposition and Plan     Clinical Impression:  1. Syncope and collapse    2. Hypotension, unspecified hypotension type         Disposition:  Ic to ed  11/27/2024  2:29 pm    Follow-up:  No follow-up provider specified.        Medications Prescribed:  Current Discharge Medication List              Supplementary Documentation:

## 2024-11-27 NOTE — ED INITIAL ASSESSMENT (HPI)
Pt arrives through triage with daughter      complaints of syncopal episode that occurred earlier today. Pt states she felt lightheaded and sat on her walker. Pt denies hitting her head, per daughter pt had 2 BP readings of 80s/50s.     Pt was seen in IC prior to ED. Pt denies feeling light headed now or any CP/ HA        Hx of non epileptic seizures- pt is on zonisamide.

## 2024-11-28 NOTE — DISCHARGE INSTRUCTIONS
Thank you for seeking care at [facility] Emergency Department.  You have been seen and evaluated for syncope.   We reviewed the results from your visit in the emergency department.   Please read the instructions provided.   If given prescriptions, take as instructed.     Remember, your care process does not end after your visit today. Please follow-up with your doctor within 1-2 days for a follow-up check to ensure you are  improving, to see if you need any further evaluation/testing, or to evaluate for any alternate diagnoses.    Please return to the emergency department if you develop chest pain, shortness of breath, dizziness, pass out, or if you develop any other new or concerning symptoms as these could be signs of more serious medical illness.    We hope you feel better.

## 2024-11-28 NOTE — ED PROVIDER NOTES
[facility] Emergency Department Note  Patient: Javier Aaron Age: 79 year old Sex: female      MRN: [id number]  : 10/10/1945    Patient Seen in: [facility] Emergency Department    History     Chief Complaint   Patient presents with    Syncope     Stated Complaint: Syncope    History obtained from: Patient and patient's daughter    Patient is a 79-year-old female with a past medical history of asthma, breast cancer status postlumpectomy and lymph node dissection, nonepileptiform seizures presenting today for evaluation of episode of loss of consciousness.  Patient states that she was standing at the kitchen counter chopping vegetables for several minutes when she began to feel lightheaded.  She states that she sat down on the seat of her walker and had a brief episode of loss consciousness.  She states that when regaining consciousness, she felt back to normal.  Patient's daughter states that she then arrived to the house and checked her blood pressure and had 2 readings of 80s over 50s.  Patient denied any associated chest pain or shortness of breath.  Denies any headaches.  She states that she has had similar episodes in the past which were attributed to her nonepileptic seizures.  She states that she has been significantly stressed over the past several weeks.  Had a dentist appointment where she received upsetting news and has been dealing with a recent diagnosis of Alzheimer's for her .  States that she has more frequent nonepileptiform episodes when stressed    Review of Systems:  Review of Systems  Positive for stated complaint: Syncope. Constitutional and vital signs reviewed. All other systems reviewed and negative except as noted above.    Patient History:  Past Medical History:    Asthma (HCC)    asthma as a child    Breast CA (HCC)    Breast cancer, left (HCC)    PER nh: LUMPECTOMY 2006'; LYMPH NODE LEFT; LT LYMPH NODE DISSECTION, 6 out of 10    Cancer (HCC)    Cataract    Encounter for  chemotherapy management    per NG: chemo for breast cancer    Extrinsic asthma, unspecified    Hearing impairment    bilateral hearing aids    Hemorrhoids    History of Papanicolaou smear of cervix    DONE [place] NG    Radiation    per NG: Lt breast radiation /Dr Champagne    Seizure disorder (HCC)    1 time event- taking meds for prevention       Past Surgical History:   Procedure Laterality Date    Breast lumpectomy  04/2006    PER NG: BREAST CANCER LEFT SIDE SEES ONCOLOGY AT [facility] FOR ANNUAL MAMMO    Breast surgery procedure unlisted  04/2006    per NG: Lt lymph node disection. 6 out of 10 affected YEARLY MAMMOGRAMS AT [facility]    Breast surgery procedure unlisted  2011    per NG:right breast calcification removal    Cataract Right 2022    Chemotherapy      Colonoscopy  2010    Hip replacement surgery      AND REVISION 2016    Lumpectomy left      Radiation left      Surgery - external  12/2017    LEFT FEMUR CANCER HARDWARE/MARLI PLACED         Family History   Problem Relation Age of Onset    Breast Cancer Self 69    Diabetes Mother     Endocrine Disorder Mother 62        per NG: pancreatitis    Hypertension Mother     Polyps Daughter         per NG: colon polyps    Diabetes Maternal Aunt         per NG: Type 2    Diabetes Maternal Uncle         per NG: Type 2    Breast Cancer Cousin     Glaucoma Neg        Specific Social Determinants of Health:   Social History     Socioeconomic History    Marital status:    Tobacco Use    Smoking status: Former     Types: Cigarettes    Smokeless tobacco: Never    Tobacco comments:     22 years ago    Vaping Use    Vaping status: Never Used   Substance and Sexual Activity    Alcohol use: No    Drug use: Never   Other Topics Concern    Caffeine Concern Yes     Comment: per NG: chocolate; tea, 2 cups daily    Reaction to local anesthetic No    Pt has a pacemaker No    Pt has a defibrillator No     Social Drivers of Health     Financial Resource Strain: Low Risk  (6/19/2024)    Financial  Resource Strain     Difficulty of Paying Living Expenses: Not hard at all     Med Affordability: No   Food Insecurity: No Food Insecurity (1/17/2024)    Food Insecurity     Food Insecurity: Never true   Transportation Needs: No Transportation Needs (1/17/2024)    Transportation Needs     Lack of Transportation: No   Physical Activity: Inactive (1/17/2024)    Exercise Vital Sign     Days of Exercise per Week: 0 days     Minutes of Exercise per Session: 0 min   Stress: No Stress Concern Present (1/17/2024)    Stress     Feeling of Stress : No   Social Connections: Socially Integrated (1/17/2024)    Social Connections     Frequency of Socialization with Friends and Family: 3   Housing Stability: Low Risk  (1/17/2024)    Housing Stability     Housing Instability: No           PSFH elements reviewed from today and agreed except as otherwise stated in HPI.    Physical Exam     ED Triage Vitals [11/27/24 1516]   /80   Pulse 90   Resp 18   Temp 97.8 °F (36.6 °C)   Temp src Temporal   SpO2 99 %   O2 Device None (Room air)       Current:/71   Pulse 89   Temp 97.8 °F (36.6 °C) (Temporal)   Resp 21   Ht 154.9 cm (5' 1\")   Wt 46.7 kg   SpO2 94%   BMI 19.46 kg/m²         Physical Exam  Constitutional:       General: She is not in acute distress.  HENT:      Head: Normocephalic and atraumatic.      Mouth/Throat:      Mouth: Mucous membranes are moist.   Eyes:      Extraocular Movements: Extraocular movements intact.   Cardiovascular:      Rate and Rhythm: Normal rate and regular rhythm.      Heart sounds: Normal heart sounds.   Pulmonary:      Effort: Pulmonary effort is normal. No respiratory distress.      Breath sounds: Normal breath sounds.   Abdominal:      Palpations: Abdomen is soft.      Tenderness: There is no abdominal tenderness.   Skin:     General: Skin is warm and dry.      Capillary Refill: Capillary refill takes less than 2 seconds.      Findings: No rash.   Neurological:      General: No focal  deficit present.      Mental Status: She is alert and oriented to person, place, and time.   Psychiatric:         Mood and Affect: Mood normal.         Behavior: Behavior normal.         ED Course   Labs:   Labs Reviewed   CBC WITH DIFFERENTIAL WITH PLATELET - Abnormal; Notable for the following components:       Result Value    Lymphocyte Absolute 0.67 (*)     All other components within normal limits   COMP METABOLIC PANEL (14) - Abnormal; Notable for the following components:    Glucose 103 (*)     Calcium, Total 10.9 (*)     Calculated Osmolality 298 (*)     A/G Ratio 2.3 (*)     All other components within normal limits   URINALYSIS WITH CULTURE REFLEX - Abnormal; Notable for the following components:    Ketones Urine Trace (*)     Protein Urine Trace (*)     Urobilinogen Urine 2 (*)     All other components within normal limits   TROPONIN I HIGH SENSITIVITY - Normal   POCT PREGNANCY URINE - Normal   RAINBOW DRAW LAVENDER   RAINBOW DRAW LIGHT GREEN   RAINBOW DRAW BLUE     Radiology findings:  I personally reviewed the images.   XR CHEST AP PORTABLE  (CPT=71045)    Result Date: 11/27/2024  CONCLUSION:  No acute cardiopulmonary process.    Dictated by (CST): Zain Rg MD on 11/27/2024 at 6:09 PM     Finalized by (CST): Zain Rg MD on 11/27/2024 at 6:10 PM           EKG as interpreted by me: VR 87, normal sinus rhythm, normal axis, no IN interval prolongation, narrow QRS, QTc 452 ms, no ST segment elevations or depressions, T wave inversions in 3, aVF; unchanged from EKG  Cardiac Monitor: Interpreted by me.   Pulse Readings from Last 1 Encounters:   11/27/24 89   , sinus,     External non-ED records reviewed independently by me: Baseline CT head from 4/15/2024 with no evidence of intracranial abnormality    MDM   79-year-old female with a past medical history of asthma, breast cancer status postlumpectomy and lymph node dissection, nonepileptiform seizures presenting for evaluation of episode of  loss of consciousness preceded by episode of lightheadedness.  Upon arrival to emergency department, patient's vitals are within normal limits.  She is well-appearing and in no acute distress.  No focal neurologic deficits    Differential diagnoses considered includes, but is not limited to: Vasovagal syncope, orthostatic hypotension, nonepileptiform seizure, electrolyte or metabolic derangement, arrhythmia    Will obtain the following tests: CBC, CMP, troponin, urinalysis, chest x-ray,  Please see ED course for my independent review of these tests/imaging results.    Initial Medications/Therapeutics administered: None    Chronic conditions affecting care: Asthma, breast cancer status postlumpectomy and lymph node dissection, nonepileptiform seizure    Workup and medications considered but not ordered: None    Social Determinants of Health that impacted care: None     ED course: Labs reassuring.  Urinalysis without evidence of infection.  I independently reviewed the chest x-ray images that show no evidence of focal opacity.  Agree with radiology read above.  Upon reevaluation, remains asymptomatic.  Is requesting to go home.  Stable for discharge home at this time with continued PCP follow-up.  Suspect symptoms likely secondary to vasovagal syncope given symptoms occurred after prolonged standing and associated preceding symptoms of lightheadedness.  Discussed oral hydration and avoiding prolonged standing.  Recommended close PCP follow-up.  Return precautions were discussed and all questions answered.  Patient and patient's daughter expressed understanding and agreement with plan      Disposition and Plan     Clinical Impression:  1. Syncope, vasovagal        Disposition:  Discharge    Follow-up:  Da Rocha, DO  [address] [place] [address]  [phone]    Schedule an appointment as soon as possible for a visit in 2 day(s)  As needed, If symptoms worsen      Medications  Prescribed:  Discharge Medication List as of 11/27/2024  6:41 PM            This note may have been created using voice dictation technology and may include inadvertent errors.      Jess Howell MD  Emergency Medicine

## 2024-12-09 ENCOUNTER — PATIENT OUTREACH (OUTPATIENT)
Dept: CASE MANAGEMENT | Age: 79
End: 2024-12-09

## 2024-12-09 DIAGNOSIS — M89.9 DISORDER OF BONE AND CARTILAGE: ICD-10-CM

## 2024-12-09 DIAGNOSIS — F41.1 GAD (GENERALIZED ANXIETY DISORDER): ICD-10-CM

## 2024-12-09 DIAGNOSIS — H25.13 AGE-RELATED NUCLEAR CATARACT OF BOTH EYES: ICD-10-CM

## 2024-12-09 DIAGNOSIS — G40.309 NONINTRACTABLE GENERALIZED IDIOPATHIC EPILEPSY WITHOUT STATUS EPILEPTICUS (HCC): ICD-10-CM

## 2024-12-09 DIAGNOSIS — I10 PRIMARY HYPERTENSION: Primary | ICD-10-CM

## 2024-12-09 DIAGNOSIS — F32.1 CURRENT MODERATE EPISODE OF MAJOR DEPRESSIVE DISORDER, UNSPECIFIED WHETHER RECURRENT (HCC): ICD-10-CM

## 2024-12-09 DIAGNOSIS — J45.20 MILD INTERMITTENT ASTHMA WITHOUT COMPLICATION (HCC): ICD-10-CM

## 2024-12-09 DIAGNOSIS — M94.9 DISORDER OF BONE AND CARTILAGE: ICD-10-CM

## 2024-12-09 NOTE — PROGRESS NOTES
12/9/2024  Spoke to Javier for [facility].      Updates to patient care team/ comments: UTD  Patient reported changes in medications: None  Med Adherence  Comment: Taking as prescribed, managed by daughter who sorts them    Health Maintenance:  Reviewed with patient.   Health Maintenance   Topic Date Due    Zoster Vaccines (1 of 2) Never done    COVID-19 Vaccine (7 - 2024-25 season) 09/01/2024    Mammogram  02/19/2025    Annual Physical  10/07/2025    Influenza Vaccine  Completed    DEXA Scan  Completed    Annual Depression Screening  Completed    Fall Risk Screening (Annual)  Completed    Pneumococcal Vaccine: 65+ Years  Completed    Colorectal Cancer Screening  Discontinued       Patient current concerns: Spoke with the patient, who reports being seen in the ER after fainting. She states that while prepping for Thanksgiving in the kitchen, she began feeling lightheaded and used her walker to sit down. Patient states she fainted, her  was present and able to wake her up. She informed her daughter of the incident, and they decided to take her to the ER. Patient underwent several exams, all of which were normal. She was discharged the same day and has not experienced the symptoms again. She recalls not eating much or drinking enough water prior to the incident.    Patient continues to receive home health services, a nurse visits every two weeks.   Patient reports being compliant with her medication regimen, states her daughter manages and sorts her medications. Patient denies recent falls, she does have gait issues which she uses a walker to ambulate. Patient lives with her  in a condo, she has assistance from her children with appointments, medications, and grocery shopping.   Goals/Action Plan:    Active goal from previous outreach: Work on in-home stretches    Patient reported progress towards goals: progressing             - What: work on in-home stretches           - Where/When/How: work on daily  stretches  Patient Reported Barriers and Concerns: no current barriers                   - Plan for overcoming barriers: N/A    Care managers interventions: Provided with in-home vaccine information. Reviewed care team, health maintenance, and medication list. Continued to provide education on how to better manage and cope with chronic conditions. Informed of the importance of reporting any new symptoms to prevent any health complications.        [facility] in [place]  They may offer in-home vaccination services for eligible individuals, especially for those who are homebound or unable to travel. Contact them to inquire about current availability.  Phone: [phone]  Website: [facility] offers home health services, which may include in-home COVID vaccinations for eligible individuals in [facility].  Phone: [phone]  Website: [facility]    Appointments reviewed with patient.   Future Appointments   Date Time Provider Department Center   1/10/2025  4:10 PM [facility]   2/19/2025  9:20 AM Caryn Corona DO ENIELH[facility] [place] [facility]   2/24/2025 12:00 PM [facility] JENNIFER RM5 [facility] JENNIFER EM [facility]   3/14/2025  9:40 AM [facility] DEXA RM1 [facility] DEXA EM [facility]   8/25/2025  3:30 PM Dominic Manzo MD [facility] HEM ONC E[facility]        Next Care Manager Follow Up Date: One month    Reason For Follow Up: review progress and or barriers towards patient's goals.     Time Spent This Encounter Total: 25 min medical record review, telephone communication, care plan updates where needed, education, goals, and action plan recreation/update. Provided acknowledgment and validation to patient's concerns.   Monthly Minute Total including today: 25 min  Physical assessment, complete health history, and need for CCM established by Da Rocha DO.

## 2024-12-24 ENCOUNTER — TELEPHONE (OUTPATIENT)
Dept: FAMILY MEDICINE CLINIC | Facility: CLINIC | Age: 79
End: 2024-12-24

## 2024-12-24 NOTE — TELEPHONE ENCOUNTER
Spoke with Rebecca, verified Patient's name and date of birth.  Gave verbal order. Verbalized understanding.

## 2024-12-24 NOTE — TELEPHONE ENCOUNTER
Rebecca calling for verbal order to discharge Patient  Patient's goals are met and no additional needs required  Please advise. Rebecca would like a call back today, will be out of office starting tomorrow, and back 1/2/24.

## 2024-12-30 ENCOUNTER — OFFICE VISIT (OUTPATIENT)
Dept: SURGERY | Facility: CLINIC | Age: 79
End: 2024-12-30

## 2024-12-30 VITALS
WEIGHT: 103 LBS | DIASTOLIC BLOOD PRESSURE: 75 MMHG | BODY MASS INDEX: 19.45 KG/M2 | HEIGHT: 61 IN | HEART RATE: 88 BPM | SYSTOLIC BLOOD PRESSURE: 146 MMHG

## 2024-12-30 DIAGNOSIS — K80.10 CALCULUS OF GALLBLADDER WITH CHRONIC CHOLECYSTITIS WITHOUT OBSTRUCTION: ICD-10-CM

## 2024-12-30 DIAGNOSIS — K80.00 CHOLELITHIASIS AND ACUTE CHOLECYSTITIS WITHOUT OBSTRUCTION: Primary | ICD-10-CM

## 2024-12-30 DIAGNOSIS — R63.4 WEIGHT LOSS: ICD-10-CM

## 2024-12-30 PROCEDURE — 99204 OFFICE O/P NEW MOD 45 MIN: CPT | Performed by: SURGERY

## 2024-12-30 NOTE — H&P
Chief complaint:   Chief Complaint   Patient presents with    Gallbladder     Referred by Dr. Da Rocha for gallbladder issues       HPI: Javier presents for consult related to gallstones. The patient has been caring for her  who is in the early stages of Alzheimer's disease and she has lost a considerable amount of weight. Workup included imaging which demonstrated a large gallstone. The patient has little appetite. She has a FH of gallstones in both her parents and her mother  of complications of gallstones and pancreatitis.           10/21/2024  US Abd  CONCLUSION:  1. Normal hepatic sonogram.  No focal hepatic lesions.  2. Cholelithiasis without wall thickening or pericholecystic fluid.  Negative sonographic Valle sign.  3. No biliary dilatation.  Normal caliber common duct.  4. Visualized portions pancreas unremarkable.  Distal pancreatic tail obscured by bowel  5. Small amount of free fluid the pattern renal fossa.    6. Normal bilateral renal sonogram without hydronephrosis.        Past medical history:   Past Medical History:    Asthma (HCC)    asthma as a child    Breast CA (HCC)    Breast cancer, left (HCC)    PER nh: LUMPECTOMY 2006'; LYMPH NODE LEFT; LT LYMPH NODE DISSECTION, 6 out of 10    Cancer (HCC)    Cataract    Encounter for chemotherapy management    per NG: chemo for breast cancer    Extrinsic asthma, unspecified    Hearing impairment    bilateral hearing aids    Hemorrhoids    History of Papanicolaou smear of cervix    DONE [facility]    Radiation    per NG: Lt breast radiation /Dr Champagne    Seizure disorder (HCC)    1 time event- taking meds for prevention       Past surgical history:   Past Surgical History:   Procedure Laterality Date    Breast lumpectomy  2006    PER NG: BREAST CANCER LEFT SIDE SEES ONCOLOGY AT [facility] FOR ANNUAL MAMMO    Breast surgery procedure unlisted  2006    per NG: Lt lymph node disection. 6 out of 10 affected YEARLY MAMMOGRAMS AT [facility]    Breast  surgery procedure unlisted  2011    per NG:right breast calcification removal    Cataract Right 2022    Chemotherapy      Colonoscopy  2010    Hip replacement surgery      AND REVISION 2016    Lumpectomy left      Radiation left      Surgery - external  12/2017    LEFT FEMUR CANCER HARDWARE/MARLI PLACED        Allergies: Allergies[1]    Medications:   Current Outpatient Medications   Medication Sig Dispense Refill    mirtazapine 15 MG Oral Tab Take 1 tablet (15 mg total) by mouth nightly. TAKE AT BEDTIME 90 tablet 3    levocetirizine 5 MG Oral Tab Take 1 tablet (5 mg total) by mouth every evening. 90 tablet 3    memantine 5 MG Oral Tab Take 1 tablet (5 mg total) by mouth daily. 90 tablet 3    zonisamide 100 MG Oral Cap Take 1 capsule (100 mg total) by mouth in the morning and 1 capsule (100 mg total) before bedtime. 180 capsule 3    pravastatin 40 MG Oral Tab Take 1 tablet (40 mg total) by mouth daily. 90 tablet 3    montelukast 10 MG Oral Tab Take 1 tablet (10 mg total) by mouth daily. 90 tablet 3    Trospium Chloride 20 MG Oral Tab Take 1 tablet (20 mg total) by mouth nightly.      Glucosamine-Chondroit-Vit C-Mn (GLUCOSAMINE 1500 COMPLEX OR) Take 2 capsules by mouth daily.      Multiple Vitamins-Minerals (MULTI FOR HER 50+) Oral Tab Take  by mouth.         Social history:   Social History     Socioeconomic History    Marital status:    Tobacco Use    Smoking status: Former     Types: Cigarettes    Smokeless tobacco: Never    Tobacco comments:     22 years ago    Vaping Use    Vaping status: Never Used   Substance and Sexual Activity    Alcohol use: No    Drug use: Never        Family history:  Family History   Problem Relation Age of Onset    Breast Cancer Self 71    Diabetes Mother     Endocrine Disorder Mother 62        per NG: pancreatitis    Hypertension Mother     Polyps Daughter         per NG: colon polyps    Diabetes Maternal Aunt         per NG: Type 2    Diabetes Maternal Uncle         per NG: Type  2    Breast Cancer Cousin     Glaucoma Neg         Review of Systems:   GENERAL: feels generally well  SKIN: no ulcerated or worrisome skin lesions  EYES:denies blurred vision or double vision  HEENT: denies new nasal congestion, sinus pain or ST  LUNGS: denies shortness of breath with exertion  CARDIOVASCULAR: denies chest pain on exertion  GI: no hematemesis, no BRBPR, no worsening heartburn  : no dysuria, no blood in urine, no difficulty urinating  MUSCULOSKELETAL: no new musculoskeletal complaints  NEURO: no persistent, recurrent  headaches  PSYCHE:no depression or anxiety  HEMATOLOGIC: no hx of blood dyscrasia  ENDOCRINE: no new endocrine problems  ALL/ASTHMA: no new hx of severe allergy or asthma  BACK: normal, no spinal deformity, no CVA tenderness    Physical examination:     Constitutional: appears well hydrated alert and responsive no acute distress noted  HEENT wnl, anicteric, PERRL, normocephalic, atraumatic  Neck supple, norm ROM, no JVD  L CTA B  H Reg rate  Abd soft, NT, ND, no masses, no hernias, no HSM.  Extr no c/c/e  Skin intact, no jaundice, no rashes, no lesions  Neuro grossly intact, no focal deficits, no tremors  Back no deformity, no CVA tnd.         Assessment and plan:  Diagnoses and all orders for this visit:    Cholelithiasis and acute cholecystitis without obstruction       Plan lap sang, possible open, possible cholangiogram.    We have discussed the surgical risks, benefits, alternatives, and expected recovery. All of the patient's questions have been answered to her satisfaction.    Thank you.     Iris Armas MD  12/30/2024  9:56 AM         [1]   Allergies  Allergen Reactions    Prednisone ANXIETY and OTHER (SEE COMMENTS)     Jittery    Clindamycin      Other reaction(s): CLINDAMYCIN    Penicillins UNKNOWN

## 2025-01-02 ENCOUNTER — PATIENT OUTREACH (OUTPATIENT)
Dept: CASE MANAGEMENT | Age: 80
End: 2025-01-02

## 2025-01-02 DIAGNOSIS — H25.13 AGE-RELATED NUCLEAR CATARACT OF BOTH EYES: ICD-10-CM

## 2025-01-02 DIAGNOSIS — M94.9 DISORDER OF BONE AND CARTILAGE: ICD-10-CM

## 2025-01-02 DIAGNOSIS — G40.309 NONINTRACTABLE GENERALIZED IDIOPATHIC EPILEPSY WITHOUT STATUS EPILEPTICUS (HCC): ICD-10-CM

## 2025-01-02 DIAGNOSIS — I10 PRIMARY HYPERTENSION: Primary | ICD-10-CM

## 2025-01-02 DIAGNOSIS — F41.1 GAD (GENERALIZED ANXIETY DISORDER): ICD-10-CM

## 2025-01-02 DIAGNOSIS — F32.1 CURRENT MODERATE EPISODE OF MAJOR DEPRESSIVE DISORDER, UNSPECIFIED WHETHER RECURRENT (HCC): ICD-10-CM

## 2025-01-02 DIAGNOSIS — M89.9 DISORDER OF BONE AND CARTILAGE: ICD-10-CM

## 2025-01-02 DIAGNOSIS — J45.20 MILD INTERMITTENT ASTHMA WITHOUT COMPLICATION (HCC): ICD-10-CM

## 2025-01-02 RX ORDER — MIRTAZAPINE 30 MG/1
TABLET, FILM COATED ORAL
COMMUNITY
Start: 2024-12-30

## 2025-01-03 ENCOUNTER — TELEPHONE (OUTPATIENT)
Dept: SURGERY | Facility: CLINIC | Age: 80
End: 2025-01-03

## 2025-01-03 DIAGNOSIS — K80.00 CHOLELITHIASIS AND ACUTE CHOLECYSTITIS WITHOUT OBSTRUCTION: Primary | ICD-10-CM

## 2025-01-10 ENCOUNTER — OFFICE VISIT (OUTPATIENT)
Dept: FAMILY MEDICINE CLINIC | Facility: CLINIC | Age: 80
End: 2025-01-10
Payer: MEDICARE

## 2025-01-10 VITALS
HEIGHT: 61 IN | SYSTOLIC BLOOD PRESSURE: 133 MMHG | WEIGHT: 100 LBS | DIASTOLIC BLOOD PRESSURE: 81 MMHG | BODY MASS INDEX: 18.88 KG/M2 | HEART RATE: 85 BPM

## 2025-01-10 DIAGNOSIS — R63.4 WEIGHT LOSS: Primary | ICD-10-CM

## 2025-01-10 PROCEDURE — 99213 OFFICE O/P EST LOW 20 MIN: CPT | Performed by: FAMILY MEDICINE

## 2025-01-10 NOTE — PROGRESS NOTES
Blood pressure 133/81, pulse 85, height 5' 1\" (1.549 m), weight 100 lb (45.4 kg).          Patient presents today following up for weight loss.  Yesterday he 2 cookies all day.  No vomiting no diarrhea.  Drinks 1 Ensure daily.  Saw a surgeon for second opinion who elected not to remove the gallbladder.    Objective comfortable no apparent distress  Additional 3 pounds of weight loss noted since last visit  Assessment    Continued weight loss    Unexplained at this time    Plan discussed possibility of zonisamide causing weight loss patient did not want to discontinue it.  Advised her to drink 2 cans of Ensure daily gastroenterology referral entered and CT scan chest abdomen and pelvis ordered

## 2025-01-23 ENCOUNTER — HOSPITAL ENCOUNTER (OUTPATIENT)
Dept: CT IMAGING | Age: 80
Discharge: HOME | End: 2025-01-23
Attending: FAMILY MEDICINE
Payer: MEDICARE

## 2025-01-23 DIAGNOSIS — R63.4 WEIGHT LOSS: ICD-10-CM

## 2025-01-23 LAB
CREAT BLD-MCNC: 0.7 MG/DL
EGFRCR SERPLBLD CKD-EPI 2021: 89 ML/MIN/1.73M2 (ref 60–?)

## 2025-01-23 PROCEDURE — 82565 ASSAY OF CREATININE: CPT

## 2025-01-23 PROCEDURE — 71260 CT THORAX DX C+: CPT | Performed by: FAMILY MEDICINE

## 2025-01-23 PROCEDURE — 74177 CT ABD & PELVIS W/CONTRAST: CPT | Performed by: FAMILY MEDICINE

## 2025-01-27 ENCOUNTER — OFFICE VISIT (OUTPATIENT)
Age: 80
End: 2025-01-27
Attending: INTERNAL MEDICINE
Payer: MEDICARE

## 2025-01-27 VITALS
WEIGHT: 101.81 LBS | BODY MASS INDEX: 19.22 KG/M2 | RESPIRATION RATE: 18 BRPM | HEART RATE: 93 BPM | DIASTOLIC BLOOD PRESSURE: 85 MMHG | HEIGHT: 61 IN | SYSTOLIC BLOOD PRESSURE: 141 MMHG | OXYGEN SATURATION: 100 % | TEMPERATURE: 98 F

## 2025-01-27 DIAGNOSIS — E83.52 HYPERCALCEMIA: ICD-10-CM

## 2025-01-27 DIAGNOSIS — Z85.3 HISTORY OF LEFT BREAST CANCER: Primary | ICD-10-CM

## 2025-01-27 DIAGNOSIS — Z12.31 SCREENING MAMMOGRAM, ENCOUNTER FOR: ICD-10-CM

## 2025-01-27 DIAGNOSIS — M89.9 RIB LESION: ICD-10-CM

## 2025-01-27 DIAGNOSIS — Z85.3 ENCOUNTER FOR FOLLOW-UP SURVEILLANCE OF BREAST CANCER: ICD-10-CM

## 2025-01-27 DIAGNOSIS — Z08 ENCOUNTER FOR FOLLOW-UP SURVEILLANCE OF BREAST CANCER: ICD-10-CM

## 2025-01-27 NOTE — PROGRESS NOTES
HPI   Javier Aaron is a 79 year old female here for follow up of   Encounter Diagnoses   Name Primary?    History of left breast cancer Yes    Encounter for follow-up surveillance of breast cancer     Screening mammogram, encounter for     Rib lesion     Hypercalcemia      She had a CT scan for weight loss.  She was found to have a lesion on the second R rib.     She might have had a seizure after Thanksgiving and had a fall after passing out.      Still caring for her  with Alzheimers.  She states she is not hungry and not eating much.  She is taking ensure 2x a day now.      No pain in the R rib.  No excess thirst.  States more constipation than usual in the past weeks and taking laxatives and states not working as much.  Only drinks a 16.9 oz bottle of water a day.     Review of Systems:   Review of Systems   Constitutional:  Positive for appetite change and unexpected weight change. Negative for fatigue.   Respiratory:  Positive for cough (states in the past 10 days had cough but resolved.  Does have post nasal gtt.). Negative for shortness of breath.    Cardiovascular:  Negative for chest pain and palpitations.   Gastrointestinal:  Negative for abdominal pain.   Genitourinary:  Negative for dysuria and frequency.    Musculoskeletal:  Positive for gait problem (uses walker). Negative for arthralgias, back pain and myalgias.   Neurological:  Positive for gait problem (uses walker) and seizures (being managed by neurology.). Negative for dizziness and headaches.   Hematological:  Does not bruise/bleed easily.   Psychiatric/Behavioral:  Positive for sleep disturbance.            Current Outpatient Medications   Medication Sig Dispense Refill    mirtazapine 30 MG Oral Tab       levocetirizine 5 MG Oral Tab Take 1 tablet (5 mg total) by mouth every evening. 90 tablet 3    memantine 5 MG Oral Tab Take 1 tablet (5 mg total) by mouth daily. 90 tablet 3    zonisamide 100 MG Oral Cap Take 1 capsule (100 mg  total) by mouth in the morning and 1 capsule (100 mg total) before bedtime. 180 capsule 3    pravastatin 40 MG Oral Tab Take 1 tablet (40 mg total) by mouth daily. 90 tablet 3    montelukast 10 MG Oral Tab Take 1 tablet (10 mg total) by mouth daily. 90 tablet 3    Trospium Chloride 20 MG Oral Tab Take 1 tablet (20 mg total) by mouth nightly.      Glucosamine-Chondroit-Vit C-Mn (GLUCOSAMINE 1500 COMPLEX OR) Take 2 capsules by mouth daily.      Multiple Vitamins-Minerals (MULTI FOR HER 50+) Oral Tab Take  by mouth.       Allergies:   Allergies   Allergen Reactions    Prednisone ANXIETY and OTHER (SEE COMMENTS)     Jittery    Clindamycin      Other reaction(s): CLINDAMYCIN    Penicillins UNKNOWN       Past Medical History:    Asthma (HCC)    asthma as a child    Breast CA (HCC)    Breast cancer, left (HCC)    PER nh: LUMPECTOMY 04/2006'; LYMPH NODE LEFT; LT LYMPH NODE DISSECTION, 6 out of 10    Cancer (HCC)    Cataract    Encounter for chemotherapy management    per NG: chemo for breast cancer    Extrinsic asthma, unspecified    Hearing impairment    bilateral hearing aids    Hemorrhoids    History of Papanicolaou smear of cervix    DONE NV NG    Radiation    per NG: Lt breast radiation /Dr Champagne    Seizure disorder (HCC)    1 time event- taking meds for prevention     Past Surgical History:   Procedure Laterality Date    Breast lumpectomy  04/2006    PER NG: BREAST CANCER LEFT SIDE SEES ONCOLOGY AT [facility] FOR ANNUAL MAMMO    Breast surgery procedure unlisted  04/2006    per NG: Lt lymph node disection. 6 out of 10 affected YEARLY MAMMOGRAMS AT [facility]    Breast surgery procedure unlisted  2011    per NG:right breast calcification removal    Cataract Right 2022    Chemotherapy      Colonoscopy  2010    Hip replacement surgery      AND REVISION 2016    Lumpectomy left      Radiation left      Surgery - external  12/2017    LEFT FEMUR CANCER HARDWARE/MARLI PLACED      Social History     Socioeconomic History    Marital status:     Tobacco Use    Smoking status: Former     Types: Cigarettes    Smokeless tobacco: Never    Tobacco comments:     22 years ago    Vaping Use    Vaping status: Never Used   Substance and Sexual Activity    Alcohol use: No    Drug use: Never   Other Topics Concern    Caffeine Concern Yes     Comment: per NG: chocolate; tea, 2 cups daily    Reaction to local anesthetic No    Pt has a pacemaker No    Pt has a defibrillator No     Social Drivers of Health     Financial Resource Strain: Low Risk  (1/2/2025)    Financial Resource Strain     Difficulty of Paying Living Expenses: Not hard at all     Med Affordability: No   Food Insecurity: No Food Insecurity (1/2/2025)    Food Insecurity     Food Insecurity: Never true   Transportation Needs: No Transportation Needs (1/2/2025)    Transportation Needs     Lack of Transportation: No   Physical Activity: Inactive (1/2/2025)    Exercise Vital Sign     Days of Exercise per Week: 0 days     Minutes of Exercise per Session: 0 min   Stress: No Stress Concern Present (1/2/2025)    Stress     Feeling of Stress : No   Social Connections: Socially Integrated (1/2/2025)    Social Connections     Frequency of Socialization with Friends and Family: 3   Housing Stability: Low Risk  (1/2/2025)    Housing Stability     Housing Instability: No         Family History   Problem Relation Age of Onset    Breast Cancer Self 69    Diabetes Mother     Endocrine Disorder Mother 62        per NG: pancreatitis    Hypertension Mother     Polyps Daughter         per NG: colon polyps    Diabetes Maternal Aunt         per NG: Type 2    Diabetes Maternal Uncle         per NG: Type 2    Breast Cancer Cousin     Glaucoma Neg          PHYSICAL EXAM:    /85 (BP Location: Right arm, Patient Position: Sitting, Cuff Size: adult)   Pulse 93   Temp 97.6 °F (36.4 °C) (Oral)   Resp 18   Ht 1.549 m (5' 1\")   Wt 46.2 kg (101 lb 12.8 oz)   SpO2 100%   BMI 19.23 kg/m²   Wt Readings from Last 6  Encounters:   01/27/25 46.2 kg (101 lb 12.8 oz)   01/10/25 45.4 kg (100 lb)   12/30/24 46.7 kg (103 lb)   11/27/24 46.7 kg (103 lb)   11/20/24 50.8 kg (112 lb)   11/08/24 46.7 kg (103 lb)     Physical Exam  General: Patient is alert, not in acute distress.  HEENT: EOMs intact. PERRL.    Neck: No JVD. No palpable lymphadenopathy. Neck is supple.  Chest: Clear to auscultation.  Breasts:  R breast no masses.  L breast with lumpectomy scar and RT changes, no masses.   Heart: Regular rate and rhythm.   Abdomen: Soft, non tender with good bowel sounds.  Extremities: No LE edema.  Varicose veins. .  Neurological: Grossly intact.   Lymphatics: There is no palpable lymphadenopathy throughout in the cervical, supraclavicular, axillary, or inguinal regions.  Psych/Depression: nl        ASSESSMENT/PLAN:     1. History of left breast cancer    2. Encounter for follow-up surveillance of breast cancer    3. Screening mammogram, encounter for    4. Rib lesion    5. Hypercalcemia        H/o breast cancer with left lumpectomy in year 2006 treated with radiation therapy.  Per record this was ER/VT positive, and she was treated in the past with letrozole.  She had a benign surgical biopsy of the retroareolar right breast for an intraductal papilloma.      On 2/19/2024 BI-RADS 2, she will be due for follow-up imaging in February 2025.      Patient had been worked up by primary care doctor for weight loss.  Her most recent weights have been stable.  She had a CT scan of the chest, abdomen and pelvis for evaluation for weight loss which was on 1/23/2025.  This showed the right second rib with a fusiform expansile lesion concerning for bone metastases.  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    She has prior h/o chondrosarcoma grade I of the LE.    Discussed with the patient and daughter and son that recommend to proceed with a bone scan and if this lesion is seen on bone scan, would then  recommend to proceed with a biopsy of this lesion with IR with image guidance.    Hypercalcemia:  will repeat calcium level on CMP and PTH.    Will hold off mammogram for now until determine whether this lesion is related to breast cancer recurrence.    Return for will notify of results and follow up.    MDM low risk    Orders Placed This Encounter   Procedures    COMP METABOLIC PANEL [E]    PTH, Intact       Results From Past 48 Hours:  No results found for this or any previous visit (from the past 48 hours).        Imaging & Referrals:  NM BONE SCAN WB (CPT=78306)   No orders of the defined types were placed in this encounter.    Component      Latest Ref Rng 11/27/2024   WBC      4.0 - 11.0 x10(3) uL 6.4    RBC      3.80 - 5.30 x10(6)uL 4.52    Hemoglobin      12.0 - 16.0 g/dL 14.3    Hematocrit      35.0 - 48.0 % 42.9    MCV      80.0 - 100.0 fL 94.9    MCH      26.0 - 34.0 pg 31.6    MCHC      31.0 - 37.0 g/dL 33.3    RDW-SD      35.1 - 46.3 fL 45.3    RDW      11.0 - 15.0 % 13.1    Platelet Count      150.0 - 450.0 10(3)uL 189.0    Prelim Neutrophil Abs      1.50 - 7.70 x10 (3) uL 5.16    Neutrophils Absolute      1.50 - 7.70 x10(3) uL 5.16    Lymphocytes Absolute      1.00 - 4.00 x10(3) uL 0.67 (L)    Monocytes Absolute      0.10 - 1.00 x10(3) uL 0.45    Eosinophils Absolute      0.00 - 0.70 x10(3) uL 0.07    Basophils Absolute      0.00 - 0.20 x10(3) uL 0.02    Immature Granulocyte Absolute      0.00 - 1.00 x10(3) uL 0.02    Neutrophils %      % 80.8    Lymphocytes %      % 10.5    Monocytes %      % 7.0    Eosinophils %      % 1.1    Basophils %      % 0.3    Immature Granulocyte %      % 0.3    Glucose      70 - 99 mg/dL 103 (H)    Sodium      136 - 145 mmol/L 144    Potassium      3.5 - 5.1 mmol/L 3.7    Chloride      98 - 112 mmol/L 110    Carbon Dioxide, Total      21.0 - 32.0 mmol/L 25.0    ANION GAP      0 - 18 mmol/L 9    BUN      9 - 23 mg/dL 11    CREATININE      0.55 - 1.02 mg/dL 0.76     BUN/CREATININE RATIO      10.0 - 20.0  14.5    CALCIUM      8.7 - 10.4 mg/dL 10.9 (H)    CALCULATED OSMOLALITY      275 - 295 mOsm/kg 298 (H)    EGFR      >=60 mL/min/1.73m2 81    ALT (SGPT)      10 - 49 U/L 20    AST (SGOT)      <34 U/L 24    ALKALINE PHOSPHATASE      55 - 142 U/L 90    Total Bilirubin      0.2 - 1.1 mg/dL 1.0    PROTEIN, TOTAL      5.7 - 8.2 g/dL 6.9    Albumin      3.2 - 4.8 g/dL 4.8    Globulin      2.0 - 3.5 g/dL 2.1    A/G Ratio      1.0 - 2.0  2.3 (H)       Legend:  (L) Low  (H) High     PROCEDURE: CT CHEST ABDOMEN PELVIS (ALL CONTRAST ONLY) (CPT=71260/43514)      COMPARISON: [facility], CT CHEST(CONTRAST ONLY) (CPT=71260), 4/28/2023, 2:11 PM.  [facility], US ABDOMEN COMPLETE (CPT=76700), 10/20/2024, 9:27 AM.      INDICATIONS: Weight loss      TECHNIQUE: CT images of the chest, abdomen and pelvis were obtained with intravenous contrast material.  Automated exposure control for dose reduction was used. Adjustment of the mA and/or kV was done based on the patient's size. Use of iterative   reconstruction technique for dose reduction was used.  An independent workstation was not used to post process imaging.  Dose information is transmitted to the ACR (American College of Radiology) NRDR (National Radiology Data Registry) which includes the    Dose Index Registry.  An independent workstation was not used to post process imaging.      FINDINGS:   Chest:   CARDIAC: Heart is not enlarged. There are coronary artery and aortic valvular calcifications. No pericardial effusion.   MEDIASTINUM/CHRIS: No mediastinal or hilar lymphadenopathy.   LUNGS/PLEURA: Central airways are patent.  Scattered paraseptal thickening and emphysematous changes.  Dependent subsegmental atelectasis.  4 mm right lower lobe ground-glass nodule (series 3, image 42).  6 mm ovoid left lower lobe ground-glass nodule   (series 3, image 40).. No pleural effusion or pneumothorax.   VASCULATURE: Main  pulmonary artery is not enlarged.  No central pulmonary embolus for left-sided aortic arch with atherosclerosis without aneurysm.   CHEST WALL: Scattered calcifications within the left breast.  Clips/sutures in the left axilla.   BONES: Fusiform expansile sclerotic and lytic morphology involving the anterior-lateral right 2nd rib with adjacent pleural thickening.  Mild degenerative endplate change within the spine.      Abdomen:   LIVER: No enlargement, atrophy, abnormal density, or significant focal lesion.    SPLEEN: No enlargement or focal lesion.    STOMACH: Prominent distention of the stomach with probable ingested contents.  No definite gastric obstruction.  Duodenum is unremarkable.   PANCREAS: No lesion, fluid collection, ductal dilatation, or atrophy.    BILIARY: Gallbladder contains a 13 mm diameter stone.  Minimal central intrahepatic biliary ductal dilatation.  No significant extrahepatic biliary ductal dilatation.   ADRENALS: No nodule or enlargement.   KIDNEYS: No enhancing renal lesion or hydronephrosis.   AORTA/VASCULAR: Atherosclerosis of the abdominal aorta.  No aneurysm.   RETROPERITONEUM: No mass or adenopathy.    BOWEL/MESENTERY: Appendix not identified.  No inflammatory changes around the cecum.  Scattered left-sided colonic diverticulosis.  No free air, significant free fluid, or lymphadenopathy in the abdomen or pelvis.  In the left pelvic sidewall adjacent to    the left iliac wing there is a 33 x 21 mm ovoid cystic focus.   ABDOMINAL WALL: Streak artifact caused by a left hip arthroplasty limiting assessment of the left hip.  No suspicious mass lesion or significant hernia.      Pelvis:   URINARY BLADDER: Bladder is incompletely distended.   PELVIC NODES: No adenopathy.    PELVIC ORGANS: Uterus is not identified.  Both ovaries are unremarkable.      Abdomen and pelvis:   BONES: Post left hip arthroplasty.  The hardware causes beam hardening artifact limiting assessment of adjacent  structures.  Prominent rightward curvature of the spine centered at L2.  Indeterminate sclerotic and lytic changes within the L3 vertebral   body possibly representing hemangioma.   OTHER: Negative.                Impression  CONCLUSION:   1.  Electronic record notes history of left breast cancer.  Postprocedural changes are present in the left breast and left axilla.  Pulmonary emphysema.  Probable prior radiation treatment changes also within the lungs.  The right 2nd rib has a fusiform   expansile lesion, concerning for a bone metastasis.   2.  In the left pelvic sidewall there is a 33 x 21 mm mesenteric cystic focus which could represent a duplication cyst.   3.  Post left hip arthroplasty.  The hardware causes beam hardening artifact limiting assessment of adjacent structures.   4.  Cholelithiasis.   5.  Coronary atherosclerosis.   6.  Colonic diverticulosis.            Dictated by (CST): Jacob Gray MD on 1/23/2025 at 6:30 PM       Finalized by (CST): Jacob Gray MD on 1/23/2025 at 6:40 PM

## 2025-01-30 ENCOUNTER — HOSPITAL ENCOUNTER (OUTPATIENT)
Dept: NUCLEAR MEDICINE | Facility: HOSPITAL | Age: 80
Discharge: HOME | End: 2025-01-30
Attending: INTERNAL MEDICINE
Payer: MEDICARE

## 2025-01-30 DIAGNOSIS — Z85.3 HISTORY OF LEFT BREAST CANCER: ICD-10-CM

## 2025-01-30 DIAGNOSIS — M89.9 RIB LESION: ICD-10-CM

## 2025-01-30 PROCEDURE — 78306 BONE IMAGING WHOLE BODY: CPT | Performed by: INTERNAL MEDICINE

## 2025-02-04 ENCOUNTER — OFFICE VISIT (OUTPATIENT)
Age: 80
End: 2025-02-04
Attending: INTERNAL MEDICINE
Payer: MEDICARE

## 2025-02-04 VITALS
DIASTOLIC BLOOD PRESSURE: 88 MMHG | RESPIRATION RATE: 18 BRPM | WEIGHT: 102.38 LBS | BODY MASS INDEX: 19.33 KG/M2 | HEIGHT: 61 IN | OXYGEN SATURATION: 97 % | TEMPERATURE: 98 F | HEART RATE: 105 BPM | SYSTOLIC BLOOD PRESSURE: 130 MMHG

## 2025-02-04 DIAGNOSIS — M89.9 RIB LESION: ICD-10-CM

## 2025-02-04 DIAGNOSIS — C50.912 MALIGNANT NEOPLASM OF LEFT BREAST IN FEMALE, ESTROGEN RECEPTOR POSITIVE, UNSPECIFIED SITE OF BREAST (HCC): ICD-10-CM

## 2025-02-04 DIAGNOSIS — C79.51 METASTASIS TO BONE (HCC): ICD-10-CM

## 2025-02-04 DIAGNOSIS — Z85.3 HISTORY OF LEFT BREAST CANCER: Primary | ICD-10-CM

## 2025-02-04 DIAGNOSIS — Z17.0 MALIGNANT NEOPLASM OF LEFT BREAST IN FEMALE, ESTROGEN RECEPTOR POSITIVE, UNSPECIFIED SITE OF BREAST (HCC): ICD-10-CM

## 2025-02-04 NOTE — PROGRESS NOTES
HPI   Javier Aaron is a 79 year old female here for follow up of   Encounter Diagnoses   Name Primary?    History of left breast cancer Yes    Rib lesion     Malignant neoplasm of left breast in female, estrogen receptor positive, unspecified site of breast (HCC)     Metastasis to bone (HCC)      She had a CT scan for weight loss.  She was found to have a lesion on the second R rib.     She completed bone scan to determine if this lesion is suspicious for metastatic disease.    No pain in the R rib.      Here with her family to discuss further management.    Having dental work on Thursday.    Review of Systems:   Review of Systems - Oncology  Present and positives per HPI        Current Outpatient Medications   Medication Sig Dispense Refill    mirtazapine 30 MG Oral Tab       levocetirizine 5 MG Oral Tab Take 1 tablet (5 mg total) by mouth every evening. 90 tablet 3    memantine 5 MG Oral Tab Take 1 tablet (5 mg total) by mouth daily. 90 tablet 3    zonisamide 100 MG Oral Cap Take 1 capsule (100 mg total) by mouth in the morning and 1 capsule (100 mg total) before bedtime. 180 capsule 3    pravastatin 40 MG Oral Tab Take 1 tablet (40 mg total) by mouth daily. 90 tablet 3    montelukast 10 MG Oral Tab Take 1 tablet (10 mg total) by mouth daily. 90 tablet 3    Trospium Chloride 20 MG Oral Tab Take 1 tablet (20 mg total) by mouth nightly.      Glucosamine-Chondroit-Vit C-Mn (GLUCOSAMINE 1500 COMPLEX OR) Take 2 capsules by mouth daily.      Multiple Vitamins-Minerals (MULTI FOR HER 50+) Oral Tab Take  by mouth.       Allergies:   Allergies   Allergen Reactions    Prednisone ANXIETY and OTHER (SEE COMMENTS)     Jittery    Clindamycin      Other reaction(s): CLINDAMYCIN    Penicillins UNKNOWN       Past Medical History:    Asthma (HCC)    asthma as a child    Breast CA (HCC)    Breast cancer, left (HCC)    PER nh: LUMPECTOMY 04/2006'; LYMPH NODE LEFT; LT LYMPH NODE DISSECTION, 6 out of 10    Cancer (HCC)    Cataract     Encounter for chemotherapy management    per NG: chemo for breast cancer    Extrinsic asthma, unspecified    Hearing impairment    bilateral hearing aids    Hemorrhoids    History of Papanicolaou smear of cervix    DONE NH NG    Radiation    per NG: Lt breast radiation /Dr Champagne    Seizure disorder (HCC)    1 time event- taking meds for prevention     Past Surgical History:   Procedure Laterality Date    Breast lumpectomy  04/2006    PER NG: BREAST CANCER LEFT SIDE SEES ONCOLOGY AT [facility] FOR ANNUAL MAMMO    Breast surgery procedure unlisted  04/2006    per NG: Lt lymph node disection. 6 out of 10 affected YEARLY MAMMOGRAMS AT [facility]    Breast surgery procedure unlisted  2011    per NG:right breast calcification removal    Cataract Right 2022    Chemotherapy      Colonoscopy  2010    Hip replacement surgery      AND REVISION 2016    Lumpectomy left      Radiation left      Surgery - external  12/2017    LEFT FEMUR CANCER HARDWARE/MARLI PLACED      Social History     Socioeconomic History    Marital status:    Tobacco Use    Smoking status: Former     Types: Cigarettes    Smokeless tobacco: Never    Tobacco comments:     22 years ago    Vaping Use    Vaping status: Never Used   Substance and Sexual Activity    Alcohol use: No    Drug use: Never   Other Topics Concern    Caffeine Concern Yes     Comment: per NG: chocolate; tea, 2 cups daily    Reaction to local anesthetic No    Pt has a pacemaker No    Pt has a defibrillator No     Social Drivers of Health     Food Insecurity: No Food Insecurity (1/2/2025)    Food Insecurity     Food Insecurity: Never true   Transportation Needs: No Transportation Needs (1/2/2025)    Transportation Needs     Lack of Transportation: No   Housing Stability: Low Risk  (1/2/2025)    Housing Stability     Housing Instability: No         Family History   Problem Relation Age of Onset    Breast Cancer Self 71    Diabetes Mother     Endocrine Disorder Mother 62        per NG: pancreatitis     Hypertension Mother     Polyps Daughter         per NG: colon polyps    Diabetes Maternal Aunt         per NG: Type 2    Diabetes Maternal Uncle         per NG: Type 2    Breast Cancer Cousin     Glaucoma Neg          PHYSICAL EXAM:    /88 (BP Location: Left arm, Patient Position: Sitting, Cuff Size: large)   Pulse 105   Temp 97.5 °F (36.4 °C) (Oral)   Resp 18   Ht 1.549 m (5' 1\")   Wt 46.4 kg (102 lb 6.4 oz)   SpO2 97%   BMI 19.35 kg/m²   Wt Readings from Last 6 Encounters:   02/04/25 46.4 kg (102 lb 6.4 oz)   01/27/25 46.2 kg (101 lb 12.8 oz)   01/10/25 45.4 kg (100 lb)   12/30/24 46.7 kg (103 lb)   11/27/24 46.7 kg (103 lb)   11/20/24 50.8 kg (112 lb)     Physical Exam  General: Patient is alert, not in acute distress.  HEENT: EOMs intact. PERRL.    Psych/Depression: nl        ASSESSMENT/PLAN:     1. History of left breast cancer    2. Rib lesion    3. Malignant neoplasm of left breast in female, estrogen receptor positive, unspecified site of breast (HCC)    4. Metastasis to bone (HCC)        H/o breast cancer with left lumpectomy in year 2006 treated with radiation therapy.  Per record this was ER/TN positive, and she was treated in the past with letrozole.  She had a benign surgical biopsy of the retroareolar right breast for an intraductal papilloma.      On 2/19/2024 BI-RADS 2, she will be due for follow-up imaging in February 2025.      Patient had been worked up by primary care doctor for weight loss.  Her most recent weights have been stable.  She had a CT scan of the chest, abdomen and pelvis for evaluation for weight loss which was on 1/23/2025.  This showed the right second rib with a fusiform expansile lesion concerning for bone metastases.  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      She has prior h/o chondrosarcoma grade I of the LE.    Lesion on bone scan was consistent with osseous metastatic disease.  Given that she had ER  positive breast cancer in the past, discussed proceeding with a PET Cerianna to determine if this is consistent with breast cancer that is ER positive.  If negative, would then proceed with the biopsy in order to have tissue for next generation sequencing and for treatment options.    Given that this is the only site and if this is the case with the PET Cerianna, being oligometastatic disease, would recommend radiation oncology evaluation to treat the site, and would then recommend resuming treatment with an aromatase inhibitor, likely exemestane.  Would recommend single agent therapy, given that tumor burden will be low after treating the site with radiation, and would be better tolerated with less side effects and adding a CDK 4/6 for such a low tumor burden.  Will reserve that for further progression in the future.    In the future, may have NGS testing.  If confirm MBC with above testing, recommend genetic testing for evaluation of a BRCA mutation for treatment options.     Hypercalcemia: Her calcium level is slightly improved, it is only 0.3 above the upper limit of normal.  PTH was normal.  Recommend to continue with oral hydration, and will continue to monitor.    Will hold off mammogram for now until determine whether this lesion is related to breast cancer recurrence.    DEXA scheduled 3/14/25.    Return for will notify of results and follow up as discussed with radiation oncology and medical oncology.    MDM high risk    No orders of the defined types were placed in this encounter.      Results From Past 48 Hours:  No results found for this or any previous visit (from the past 48 hours).        Imaging & Referrals:  OP REFERRAL TO PALLIATIVE CARE  PET STANDARD BODY SCAN (ONCOLOGY) (CPT=78815)   Orders Placed This Encounter   Procedures    Referral to Palliative Care - [facility] ([facility])     PROCEDURE: NM BONE SCAN WB (CPT=78306)      COMPARISON: [facility], CT  CHEST+ABDOMEN+PELVIS(ALL CNTRST ONLY)(CPT=71260/60341), 1/23/2025, 5:28 PM.      INDICATIONS: History of left breast cancer with rib lesion (M89.9, Z85.3).       TECHNIQUE: A planar bone scan was done following the injection of 25.0 millicuries of Tc-99m MDP into a right antecubital vein.    Automated whole body images and spots of the thorax were obtained.       FINDINGS:   SKULL: No pathologic radiotracer activity is seen. Relative photopenia in the region of the oral cavity may reflect dental hardware/amalgam.   SPINE: Angulated dextroscoliosis of the lumbar spine is seen with compensatory dextroscoliosis of the thoracic spine.   THORAX: Heterogeneous infiltrative activity is demonstrated throughout the lateral and anterolateral right 2nd rib.   PELVIS: No pathologic radiotracer activity is seen.   EXTREMITIES: There is photopenia in the region of the left hip corresponding to a left hip prosthesis.   GENITOURINARY: Physiologic excretion is apparent.   OTHER: Negative for delayed soft tissue uptake.                 Impression:  CONCLUSION:   1. Infiltrative osseous metastatic disease involving the right 2nd rib.      2. No additional osseous metastatic disease identified.      3. Left hip arthroplasty.      4. Lesser incidental findings as above.            Dictated by (CST): Bruce Lucia MD on 1/30/2025 at 10:54 AM       Finalized by (CST): Bruce Lucia MD on 1/30/2025 at 10:57 AM      Component      Latest Ref Rng 11/27/2024 1/27/2025   Glucose      70 - 99 mg/dL 103 (H)  105 (H)    Sodium      136 - 145 mmol/L 144  148 (H)    Potassium      3.5 - 5.1 mmol/L 3.7  3.4 (L)    Chloride      98 - 112 mmol/L 110  108    Carbon Dioxide, Total      21.0 - 32.0 mmol/L 25.0  29.0    ANION GAP      0 - 18 mmol/L 9  11    BUN      9 - 23 mg/dL 11  11    CREATININE      0.55 - 1.02 mg/dL 0.76  0.64    BUN/CREATININE RATIO      10.0 - 20.0  14.5  17.2    CALCIUM      8.7 - 10.4 mg/dL 10.9 (H)  10.7 (H)    CALCULATED  OSMOLALITY      275 - 295 mOsm/kg 298 (H)  306 (H)    EGFR      >=60 mL/min/1.73m2 81  91    ALT (SGPT)      10 - 49 U/L 20  14    AST (SGOT)      <34 U/L 24  21    ALKALINE PHOSPHATASE      55 - 142 U/L 90  90    Total Bilirubin      0.2 - 1.1 mg/dL 1.0  0.7    PROTEIN, TOTAL      5.7 - 8.2 g/dL 6.9  6.8    Albumin      3.2 - 4.8 g/dL 4.8  4.5    Globulin      2.0 - 3.5 g/dL 2.1  2.3    A/G Ratio      1.0 - 2.0  2.3 (H)  2.0    Patient Fasting for CMP?  No    PTH INTACT      18.5 - 88.0 pg/mL  46.3       Legend:  (H) High  (L) Low

## 2025-02-05 ENCOUNTER — PATIENT OUTREACH (OUTPATIENT)
Dept: CASE MANAGEMENT | Age: 80
End: 2025-02-05

## 2025-02-05 DIAGNOSIS — G40.309 NONINTRACTABLE GENERALIZED IDIOPATHIC EPILEPSY WITHOUT STATUS EPILEPTICUS (HCC): ICD-10-CM

## 2025-02-05 DIAGNOSIS — M94.9 DISORDER OF BONE AND CARTILAGE: ICD-10-CM

## 2025-02-05 DIAGNOSIS — J45.20 MILD INTERMITTENT ASTHMA WITHOUT COMPLICATION (HCC): ICD-10-CM

## 2025-02-05 DIAGNOSIS — F41.1 GAD (GENERALIZED ANXIETY DISORDER): ICD-10-CM

## 2025-02-05 DIAGNOSIS — M89.9 DISORDER OF BONE AND CARTILAGE: ICD-10-CM

## 2025-02-05 DIAGNOSIS — J30.1 SEASONAL ALLERGIC RHINITIS DUE TO POLLEN: ICD-10-CM

## 2025-02-05 DIAGNOSIS — F32.1 CURRENT MODERATE EPISODE OF MAJOR DEPRESSIVE DISORDER, UNSPECIFIED WHETHER RECURRENT (HCC): ICD-10-CM

## 2025-02-05 DIAGNOSIS — I10 PRIMARY HYPERTENSION: Primary | ICD-10-CM

## 2025-02-05 DIAGNOSIS — H25.13 AGE-RELATED NUCLEAR CATARACT OF BOTH EYES: ICD-10-CM

## 2025-02-05 NOTE — PROGRESS NOTES
2/5/2025  Spoke to Javier for [facility].      Updates to patient care team/ comments: UTD  Patient reported changes in medications: None  Med Adherence  Comment: Taking as prescribed    Health Maintenance:  Reviewed with patient.   Health Maintenance   Topic Date Due    Zoster Vaccines (1 of 2) Never done    COVID-19 Vaccine (7 - 2024-25 season) 09/01/2024    Mammogram  02/19/2025    Annual Physical  10/07/2025    Influenza Vaccine  Completed    DEXA Scan  Completed    Annual Depression Screening  Completed    Fall Risk Screening (Annual)  Completed    Pneumococcal Vaccine: 50+ Years  Completed    Meningococcal B Vaccine  Aged Out    Colorectal Cancer Screening  Discontinued     Patient current concerns: Spoke with the patient, who reports following up with PCP Dr. Rocha on 01/10/25 to discuss weight loss. Patient was instructed to complete a CT chest and abdomen test.    Wt Readings from Last 6 Encounters:   02/04/25 102 lb 6.4 oz   01/27/25 101 lb 12.8 oz   01/10/25 100 lb   12/30/24 103 lb   11/27/24 103 lb   11/20/24 112 lb     Patient completed the CT chest and abdomen, which showed a lesion on the right side of the 2nd rib. She was instructed to follow up with oncologist Dr. Manzo.  Da Rocha, DO  1/24/2025  9:21 AM CST       Suspicious lesions seen on rib with CAT scan that was done.  Patient to follow-up with Dr. Scales from oncology referral entered.     Impression   CONCLUSION:  1.  Electronic record notes history of left breast cancer.  Postprocedural changes are present in the left breast and left axilla.  Pulmonary emphysema.  Probable prior radiation treatment changes also within the lungs.  The right 2nd rib has a fusiform  expansile lesion, concerning for a bone metastasis.  2.  In the left pelvic sidewall there is a 33 x 21 mm mesenteric cystic focus which could represent a duplication cyst.  3.  Post left hip arthroplasty.  The hardware causes beam hardening artifact limiting assessment of adjacent  structures.  4.  Cholelithiasis.  5.  Coronary atherosclerosis.  6.  Colonic diverticulosis.        Dictated by (CST): Jacob Gray MD on 1/23/2025 at 6:30 PM        Patient was seen by Dr. Manzo on 01/27/25, during which the CT results were reviewed, and the lesion was noted. Patient was instructed to complete a bone scan, which was done and confirmed the lesion, which was identified as malignant. Patient was given an order for a PET scan, which is scheduled for tomorrow. Patient states she has questions regarding the instructions provided for the exam.  Impression   CONCLUSION:  1. Infiltrative osseous metastatic disease involving the right 2nd rib.     2. No additional osseous metastatic disease identified.     3. Left hip arthroplasty.     4. Lesser incidental findings as above.      Dictated by (CST): Bruce Lucia MD on 1/30/2025 at 10:54 AM       Patient reports being compliant with her medication regimen, states that her daughter manages and sorts her medications. Patient denies recent falls, she does have gait issues which she uses a walker to ambulate. Patient lives with her  in a condo, she has assistance from her children with appointments, medications, and grocery shopping.   Goals/Action Plan:     Active goal from previous outreach: work on in-home stretches     Patient reported progress towards goals: no progress               - What: work on in-home stretches           - Where/When/How: work on daily stretches  Patient Reported Barriers and Concerns: no barrriers                   - Plan for overcoming barriers: N/A    Care managers interventions:  Reviewed radiology appointment instructions.   [facility] PET BREAST ER RECEPTR DOSE    Panel: [facility] PET BREAST ER RECEPTOR PNL  Please arrive 15 minutes early for this appointment.    BEFORE YOUR EXAM  -No strenuous activity for 48 hours before your exam.  -After 5:00 PM the night before your test, avoid carbohydrates (no candy, gum, mints, ice cream, cake,  sweets, or soda pop).  -Do not eat any food for 6 hours before your appointment time; please only drink plain water.    ON THE DAY OF YOUR EXAM  -Dress warmly and comfortably. No metal in your clothes, such as snaps or zippers, and please leave valuables/jewelry at home.  -For this test, you will receive an injection, rest for 30-90 minutes and take a 15-50 minute scan.  -There are no side effects to the injection.     Updated medication list, reviewed health maintenance, and care team.   Briefly reviewed diagnostic test results with patient.  Listened to patient's concerns, provided support, and encouraged her to meet goals. Informed of the importance on reporting any new symptoms to prevent any health complications.  Appointments reviewed with patient.   Future Appointments   Date Time Provider Department Center   2/6/2025 12:00 PM [facility] PET DOSE RM [facility] PET SCAN EM [facility]   2/6/2025  1:15 PM [facility] PET RM1 [facility] PET SCAN EM [facility]   2/19/2025  9:20 AM Caryn Corona DO [facility]   3/14/2025  9:40 AM [facility] DEXA RM1 [facility] DEXA EM [facility]   8/25/2025  3:30 PM Dominic Manzo MD [facility] Care Manager Follow Up Date: One month    Reason For Follow Up: review progress and or barriers towards patient's goals.     Time Spent This Encounter Total: 32 min medical record review, telephone communication, care plan updates where needed, education, goals, and action plan recreation/update. Provided acknowledgment and validation to patient's concerns.   Monthly Minute Total including today: 32 min  Physical assessment, complete health history, and need for CCM established by Da Rocha DO.

## 2025-02-06 ENCOUNTER — HOSPITAL ENCOUNTER (OUTPATIENT)
Dept: NUCLEAR MEDICINE | Facility: HOSPITAL | Age: 80
Discharge: HOME | End: 2025-02-06
Attending: INTERNAL MEDICINE
Payer: MEDICARE

## 2025-02-06 DIAGNOSIS — Z17.0 MALIGNANT NEOPLASM OF LEFT BREAST IN FEMALE, ESTROGEN RECEPTOR POSITIVE, UNSPECIFIED SITE OF BREAST (HCC): ICD-10-CM

## 2025-02-06 DIAGNOSIS — C79.51 METASTASIS TO BONE (HCC): ICD-10-CM

## 2025-02-06 DIAGNOSIS — C50.912 MALIGNANT NEOPLASM OF LEFT BREAST IN FEMALE, ESTROGEN RECEPTOR POSITIVE, UNSPECIFIED SITE OF BREAST (HCC): ICD-10-CM

## 2025-02-06 PROCEDURE — 78815 PET IMAGE W/CT SKULL-THIGH: CPT | Performed by: INTERNAL MEDICINE

## 2025-02-11 ENCOUNTER — TELEPHONE (OUTPATIENT)
Age: 80
End: 2025-02-11

## 2025-02-11 DIAGNOSIS — C79.51 METASTASIS TO BONE (HCC): Primary | ICD-10-CM

## 2025-02-11 RX ORDER — EXEMESTANE 25 MG/1
25 TABLET ORAL DAILY
Qty: 90 TABLET | Refills: 3 | Status: SHIPPED | OUTPATIENT
Start: 2025-02-11

## 2025-02-11 NOTE — TELEPHONE ENCOUNTER
Pt's called with her son Paul on the phone and they advised the results from pt's PET scan are in and they would like a call back to discuss the results as well as next steps    Please give Paul a call back   Possible gastritis with Hx of similar episodes. Will also evaluate for biliary pathology and pancreatitis. Will check for pregnancy. No lower abdominal pain to suggest appendicitis or ovarian pathology. Obtain labs, urine, US and give meds. Possible gastritis with Hx of similar episodes. Will also evaluate for biliary pathology and pancreatitis. Will check for pregnancy. No lower abdominal pain to suggest appendicitis or ovarian pathology. Obtain labs, urine, US and give meds.      Jaya Estrada PA-C: PT with stable VS, no acute distress, non toxic appearing, tolerating PO in the ED, Pt with no acute findings on eval, Pt with improvement of symptoms while in the ED, Pt cleared for dc home with supportive care, follow up to GI, educated about when to return to the ED if needed. PT verbalizes that he understands all instructions and results. Pt informed that ED is open and available 24/7 365 days a yr, encouraged to return to the ED if they have any change in condition, or feel the need for revaluation.

## 2025-02-11 NOTE — TELEPHONE ENCOUNTER
Discussed with patient's son Delano that the PET/CT did show activity at the right second rib indicating that the breast cancer is estrogen receptor positive.  She also has a small focus in her right parietal bone in the skull.  Discussed that we will proceed with plan with radiation to the right second rib, but the patient does not become symptomatic and in addition, will initiate treatment with exemestane and prescription will be sent to the pharmacy [facility] which she can begin to take as soon as she has it.  Will have follow-up in a month and then she had completed radiation as well as will have started the exemestane to see how she is tolerating treatment.  He will be contacting his mother to discuss the above and his sister as well.

## 2025-02-18 ENCOUNTER — OFFICE VISIT (OUTPATIENT)
Dept: RADIATION ONCOLOGY | Facility: HOSPITAL | Age: 80
End: 2025-02-18
Attending: RADIOLOGY
Payer: MEDICARE

## 2025-02-18 VITALS
HEART RATE: 101 BPM | SYSTOLIC BLOOD PRESSURE: 145 MMHG | TEMPERATURE: 98 F | RESPIRATION RATE: 20 BRPM | OXYGEN SATURATION: 97 % | DIASTOLIC BLOOD PRESSURE: 82 MMHG | WEIGHT: 98.38 LBS | BODY MASS INDEX: 19 KG/M2

## 2025-02-18 DIAGNOSIS — C79.51 SECONDARY CANCER OF BONE (HCC): Primary | ICD-10-CM

## 2025-02-18 PROCEDURE — 99212 OFFICE O/P EST SF 10 MIN: CPT

## 2025-02-18 NOTE — CONSULTS
[facility]    PATIENT'S NAME: MIRZA MENDOSA   RADIATION ONCOLOGIST: Davion Wallace MD   PATIENT ACCOUNT #: [id number] LOCATION: [facility]   MEDICAL RECORD #: [id number] YOB: 1945   CONSULTATION DATE: 02/18/2025       RADIATION ONCOLOGY CONSULTATION    REFERRING PHYSICIAN:  Dr. Manzo.    DIAGNOSIS:  Oligometastatic breast cancer.    HISTORY OF PRESENT ILLNESS:  The patient is a 79-year-old female with a history of breast cancer dating back almost 20 years.  She had a lumpectomy followed by radiation for a left-sided breast cancer.  Her tumor was ER and IA positive and she was treated with letrozole.  She did well for quite some time.  Recently, she has had some weight loss and was undergoing a workup from her primary care physician.  This ultimately included a CT scan of the chest, abdomen, and pelvis on 01/23/2025.  This showed an expansile sclerotic and lytic lesion involving the anterolateral right second rib.  A bone scan was done to further characterize this and showed infiltrative osseous metastatic disease involving the right second rib, but no other evidence of disease elsewhere.  She then ultimately underwent a PET Cerianna on 02/06/2025.  This revealed the right second rib metastasis with the possibility of a smaller lesion in the right parietal bone with no clear CT correlate.  Given the oligoprogressive nature of her disease, Dr. Manzo felt that radiation may be worthwhile in addition to exemestane.  The patient was then referred to our department for a discussion regarding treatment.    The patient otherwise feels well and has no pain at the right second rib location.  She does have some decreased appetite and some weight loss as per HPI.  She denies other significant problems apart from some nervousness and anxiety.  She denies bony or joint pain, fevers, chills, chest pain, shortness of breath, or other similar complaints.    PAST MEDICAL HISTORY:  The patient has a past  history of breast cancer as per HPI.  She also has history of asthma, hemorrhoids, and cataracts.     PAST SURGICAL HISTORY:  She has had the lumpectomy as per HPI, as well as a hip replacement and cataract removal.     MEDICATIONS:  Exemestane, levocetirizine, memantine, mirtazapine, montelukast, pravastatin, and zonisamide.      ALLERGIES:  No known drug allergies.    FAMILY HISTORY:  Cousin with breast cancer.    SOCIAL HISTORY:  The patient does have a trace smoking history many years ago.  She denies alcohol use.  She is seen in consultation with 2 of her children.  She denies transportation-related difficulties.    REVIEW OF SYSTEMS:  A 10-point review of systems is performed.  Pertinent positives and negatives are as per HPI.       PHYSICAL EXAMINATION:    GENERAL:  A 79-year-old female who is pleasant, cooperative, alert, awake, and oriented x3.  She is in no acute distress.  She has an ECOG performance score of 0 and a current pain score of 0.  VITAL SIGNS:  Blood pressure of 145/82, pulse of 101, respiratory rate of 20, and temperature of 97.9.  Her weight is 98 pounds.  NECK:  Supple with no lymphadenopathy.  LUNGS:  Clear to auscultation bilaterally.  HEART:  Regular rate and rhythm, with normal S1, S2, and no audible murmurs.    LYMPHATICS:  There is no supraclavicular, axillary, or inguinal lymphadenopathy.  ABDOMEN:  Soft, nontender, and nondistended with normoactive bowel sounds and no hepatosplenomegaly.  EXTREMITIES:  Without clubbing, cyanosis, or edema.    IMPRESSION:  This is a 79-year-old female with a distant history of breast cancer who now appears to have oligoprogressive disease.  She has PET Cerianna positive disease in the right second rib with no other obvious findings.  There is a question of a skull metastasis, though there is no CT correlate and this is much less obvious.  She has started on exemestane and is referred to Radiation Oncology for consideration for treatment to this sole  site of oligoprogression.    RECOMMENDATIONS:  I do believe the patient is a good candidate for radiation treatment.  She has bone-only metastatic disease in a single site of oligoprogression in a right second rib.  There is no evidence of other disease.  There is the possibility of a skull metastasis, but this is hard to characterize at this point and can be observed.  I do recommend treatment to the right second rib and would recommend treatment with ablative radiotherapy.  Given her long disease history and the fact that this is a solitary site, I am optimistic that with treatment, we can eradicate the disease in this location and lead to a significant period of excellent disease control.  To that extent, I recommend 2700 to 3000 cGy in 3 fractions to that rib.  I would do this utilizing stereotactic body radiotherapeutic techniques, and I am optimistic that she would tolerate this well.    I then had a talk with the patient and her children regarding the potential side effects of this therapy.  There may be some fatigue and there is the possibility of some chest wall discomfort or rib pain.  The patient already is at risk for rib fracture and this continues after the radiation.  Otherwise, I expect her to tolerate this treatment well and other more meaningful side effects would be unexpected.  Following this long and thorough discussion of all the risks and benefits of treatment, the patient indicated that she understood all these issues and does wish to proceed with treatment as I previously dictated.    We, therefore, will schedule the patient for simulation soon, with the intent to begin her treatment shortly thereafter.      Thank you very much for allowing me the opportunity to participate in the care of this patient.  If there should be any questions regarding the radiotherapy, please feel free to contact me at any time.    Dictated By Davion Wallace MD  d: 02/18/2025 11:10:38  t: 02/18/2025  11:25:31  [facility] [id number]  NAD/    cc: MD Da Singh DO

## 2025-02-18 NOTE — PROGRESS NOTES
Nursing Consultation Note  Patient: Javier Aaron  YOB: 1945  Age: 79 year old  Radiation Oncologist: Dr. Davion Wallace  Referring Physician: Dominic Manzo  Diagnosis:[unfilled]  Consult Date: 2/18/2025      Chemotherapy: N/A  Labs: Reviewed  Imaging: Reviewed  Is the patient of child-bearing age?         No  Has the patient received radiation therapy in the past? yes- L Breast cancer- RT in 2006.   Does the patient have an implantable device?No   Patient has/has had:     1. Assistive Devices: Walker    2. Flu Vaccination: yes    3. Pneumonia Vaccination:  no--referral to ask PCP    Vital Signs:   Vitals:    02/18/25 0923   BP: 145/82   Pulse: 101   Resp: 20   Temp: 97.9 °F (36.6 °C)   ,   Wt Readings from Last 6 Encounters:   02/18/25 44.6 kg (98 lb 6.4 oz)   02/04/25 46.4 kg (102 lb 6.4 oz)   01/27/25 46.2 kg (101 lb 12.8 oz)   01/10/25 45.4 kg (100 lb)   12/30/24 46.7 kg (103 lb)   11/27/24 46.7 kg (103 lb)       Nursing Note:      Review of Systems   Constitutional:  Positive for activity change, appetite change, fatigue and unexpected weight change.   HENT:  Positive for postnasal drip.    Eyes: Negative.    Respiratory:  Positive for cough.         Occasionally with allergies   Cardiovascular: Negative.    Gastrointestinal:  Positive for constipation.        Takes ducolax PRN   Endocrine: Negative.    Genitourinary: Negative.    Musculoskeletal: Negative.    Skin: Negative.    Allergic/Immunologic: Negative.    Neurological:  Positive for syncope and light-headedness.        One episode in November- fainted    In 2/4/25: felt faint, didn't actually faint     Hematological: Negative.    Psychiatric/Behavioral:  The patient is nervous/anxious.           Allergies:  Allergies[1]    Current Outpatient Medications   Medication Sig Dispense Refill    exemestane 25 MG Oral Tab Take 1 tablet (25 mg total) by mouth daily. 90 tablet 3    mirtazapine 30 MG Oral Tab       memantine 5 MG Oral Tab Take 1  tablet (5 mg total) by mouth daily. 90 tablet 3    zonisamide 100 MG Oral Cap Take 1 capsule (100 mg total) by mouth in the morning and 1 capsule (100 mg total) before bedtime. 180 capsule 3    pravastatin 40 MG Oral Tab Take 1 tablet (40 mg total) by mouth daily. 90 tablet 3    montelukast 10 MG Oral Tab Take 1 tablet (10 mg total) by mouth daily. 90 tablet 3    Trospium Chloride 20 MG Oral Tab Take 1 tablet (20 mg total) by mouth nightly.      Glucosamine-Chondroit-Vit C-Mn (GLUCOSAMINE 1500 COMPLEX OR) Take 2 capsules by mouth daily.      Multiple Vitamins-Minerals (MULTI FOR HER 50+) Oral Tab Take  by mouth.      levocetirizine 5 MG Oral Tab Take 1 tablet (5 mg total) by mouth every evening. (Patient not taking: Reported on 2/18/2025) 90 tablet 3       Preferred Pharmacy:    [id number] [place], [place] - [address] AT [facility], [phone], [phone]  [facility] [address]  [facility] [address]  Phone: [phone] Fax: [phone]      Past Medical History:    Asthma (HCC)    asthma as a child    Breast CA (HCC)    Breast cancer, left (HCC)    PER nh: LUMPECTOMY 04/2006'; LYMPH NODE LEFT; LT LYMPH NODE DISSECTION, 6 out of 10    Cancer (HCC)    Cataract    Encounter for chemotherapy management    per NG: chemo for breast cancer    Extrinsic asthma, unspecified    Hearing impairment    bilateral hearing aids    Hemorrhoids    History of Papanicolaou smear of cervix    DONE IA NG    Radiation    per NG: Lt breast radiation /Dr Champagne    Seizure disorder (HCC)    1 time event- taking meds for prevention       Past Surgical History:   Procedure Laterality Date    Breast lumpectomy  04/2006    PER NG: BREAST CANCER LEFT SIDE SEES ONCOLOGY AT [facility] FOR ANNUAL MAMMO    Breast surgery procedure unlisted  04/2006    per NG: Lt lymph node disection. 6 out of 10 affected YEARLY MAMMOGRAMS AT [facility]    Breast surgery procedure unlisted  2011    per NG:right breast  calcification removal    Cataract Right 2022    Chemotherapy      Colonoscopy  2010    Hip replacement surgery      AND REVISION 2016    Lumpectomy left      Radiation left      Surgery - external  12/2017    LEFT FEMUR CANCER HARDWARE/MARLI PLACED        Social History     Socioeconomic History    Marital status:      Spouse name: Not on file    Number of children: Not on file    Years of education: Not on file    Highest education level: Not on file   Occupational History    Not on file   Tobacco Use    Smoking status: Former     Types: Cigarettes    Smokeless tobacco: Never    Tobacco comments:     Quit when she as 24   Vaping Use    Vaping status: Never Used   Substance and Sexual Activity    Alcohol use: No    Drug use: Never    Sexual activity: Not on file   Other Topics Concern     Service Not Asked    Blood Transfusions Not Asked    Caffeine Concern Yes     Comment: per NG: chocolate; tea, 2 cups daily    Occupational Exposure Not Asked    Hobby Hazards Not Asked    Sleep Concern Not Asked    Stress Concern Not Asked    Weight Concern Not Asked    Special Diet Not Asked    Back Care Not Asked    Exercise Not Asked    Bike Helmet Not Asked    Seat Belt Not Asked    Self-Exams Not Asked    Grew up on a farm Not Asked    History of tanning Not Asked    Outdoor occupation Not Asked    Breast feeding Not Asked    Reaction to local anesthetic No    Pt has a pacemaker No    Pt has a defibrillator No   Social History Narrative    Not on file     Social Drivers of Health     Food Insecurity: No Food Insecurity (1/2/2025)    Food Insecurity     Food Insecurity: Never true   Transportation Needs: No Transportation Needs (1/2/2025)    Transportation Needs     Lack of Transportation: No     Car Seat: Not on file   Housing Stability: Low Risk  (1/2/2025)    Housing Stability     Housing Instability: No     Housing Instability Emergency: Not on file     Crib or Bassinette: Not on file       ECOG:  Grade 1 - No  physically strenuous activity, but ambulatory and able to carry out light and sedentary work (e.g. office work, light house work).    Education:  yes    Are ADL's met?  Yes  Does patient feel safe in their environment?  Yes  Care decisions:  Patient and/or surrogate IS involved in care decisions.  Advanced directives:  Patient HAS advnaced directives and a copy has been requested.  Transportation:  Adequate transportation available for expected visits    Pain:   ;Pain Score: 0   ;    ;   Primary language:  English  Language line required?  no  Comprehension Ability:  fair  Able to read?  yes  Able to write?  yes  Communication tools:   none  Patient's ability to learn:  good  Readiness to learn:  Motivated  Learning preferences:  Discussion and Handout  Barriers to learning:  None  Interventions to reduce barriers:  Consult, Face patient when speaking, Provide support/encouragement, Provide printed materials, and Patient to express feelings  Visual aids:  no  Hearing disability:  no  Dentures:  no  Knowledge Deficit Plan Of Care:    Problem:  Knowledge Deficit    Problems related to:    Radiation therapy    Interventions:  Assess patient knowledge level  Instruct on treatment planning  Instruct on radiation therapy appointment scheduling  Instruct on purpose of radiation therapy  Instruct on side effects of radiation therapy    Expected Outcomes:  Knowledge of care plan  Knowledge of radiation therapy  Knowledge of side effects of radiation therapy and management  Comfortable with knowledge level    Progress Toward Outcome:  Making progress    Pamphlets/Handouts Given to Patient:  Understanding radiation therapy    Radiation process overview         Patient here for consultation with Dr. Vinnie Wallace. Accompanied by her 2 children. Patient lives at home with her  who has Alzheimer's. VSS, denies pain currently. Patient had CT scan 1/23/25 for weight loss, incidentally found rib mets. Then had bone scan 1/30/25,  and PET scan 2/6/25. Patient started exemestane. I explained to the patient that today she would meet Dr. Vinnie Wallace but while being treated there was a possibility that she might also encounter the physicians who cover for Dr. Vinnie Wallace which are Dr. Chilel, Dr. Kenney, and Dr. Suarez. Patient educated on radiation process. Consent signed, CT sim task sent. Patient provided with radiation RN number in case of additional questions.             [1]   Allergies  Allergen Reactions    Prednisone ANXIETY and OTHER (SEE COMMENTS)     Jittery    Clindamycin      Other reaction(s): CLINDAMYCIN    Penicillins UNKNOWN

## 2025-02-24 ENCOUNTER — TELEPHONE (OUTPATIENT)
Dept: NEUROLOGY | Facility: CLINIC | Age: 80
End: 2025-02-24

## 2025-02-24 NOTE — TELEPHONE ENCOUNTER
Patient was scheduled for an appointment with Dr. Corona on 02/19/25, but it was cancelled due to the doctor being out of the office. Patient has not yet received a call to reschedule the appointment.  Please contact chidi Miller to reschedule the appointment.     Notes: 3 mos f/u  Spoke with pt and informed of cancellation   Made On:  Confirmed:  Change Notes:  Canceled: 11/20/2024 9:37 AM  2/12/2025 12:14 PM  2/19/2025 8:09 AM  2/19/2025 8:24 AM By:  By:  By:  By: SUDHA LAMB [[id number]] (ES)  GENERIC, MYCHART [MYCHARTG] (PtMobApp)  MACARIO DAVIES [[id number]] (ES)  MACARIO DAVIES [[id number]] (ES)

## 2025-02-25 ENCOUNTER — APPOINTMENT (OUTPATIENT)
Dept: RADIATION ONCOLOGY | Facility: HOSPITAL | Age: 80
End: 2025-02-25
Attending: RADIOLOGY
Payer: MEDICARE

## 2025-03-01 ENCOUNTER — APPOINTMENT (OUTPATIENT)
Dept: RADIATION ONCOLOGY | Facility: HOSPITAL | Age: 80
End: 2025-03-01
Attending: RADIOLOGY
Payer: MEDICARE

## 2025-03-03 ENCOUNTER — APPOINTMENT (OUTPATIENT)
Dept: RADIATION ONCOLOGY | Facility: HOSPITAL | Age: 80
End: 2025-03-03
Attending: RADIOLOGY
Payer: MEDICARE

## 2025-03-03 PROCEDURE — 77373 STRTCTC BDY RAD THER TX DLVR: CPT | Performed by: RADIOLOGY

## 2025-03-05 ENCOUNTER — OFFICE VISIT (OUTPATIENT)
Age: 80
End: 2025-03-05
Attending: INTERNAL MEDICINE
Payer: MEDICARE

## 2025-03-05 ENCOUNTER — APPOINTMENT (OUTPATIENT)
Dept: RADIATION ONCOLOGY | Facility: HOSPITAL | Age: 80
End: 2025-03-05
Attending: RADIOLOGY
Payer: MEDICARE

## 2025-03-05 VITALS
RESPIRATION RATE: 18 BRPM | HEIGHT: 61 IN | WEIGHT: 98 LBS | BODY MASS INDEX: 18.5 KG/M2 | OXYGEN SATURATION: 97 % | HEART RATE: 104 BPM | SYSTOLIC BLOOD PRESSURE: 138 MMHG | DIASTOLIC BLOOD PRESSURE: 75 MMHG | TEMPERATURE: 99 F

## 2025-03-05 DIAGNOSIS — Z71.89 ADVANCE CARE PLANNING: ICD-10-CM

## 2025-03-05 DIAGNOSIS — Z71.89 GOALS OF CARE, COUNSELING/DISCUSSION: ICD-10-CM

## 2025-03-05 DIAGNOSIS — C79.51 METASTASIS TO BONE (HCC): ICD-10-CM

## 2025-03-05 DIAGNOSIS — Z85.3 HISTORY OF LEFT BREAST CANCER: ICD-10-CM

## 2025-03-05 DIAGNOSIS — Z51.5 PALLIATIVE CARE ENCOUNTER: Primary | ICD-10-CM

## 2025-03-05 PROCEDURE — 77373 STRTCTC BDY RAD THER TX DLVR: CPT | Performed by: RADIOLOGY

## 2025-03-05 NOTE — TELEPHONE ENCOUNTER
Pt son called looking to speak with clinical team about r/s apt. They are hoping if possible that their mom could get an apt with one of Dr Corona's colleagues while she is on vacation this month. Family is worried about pt due to recent diagnosis of Cancer and how treatment is affecting her memory. It was verbalized to family that it would be a one time visit with one of the colleague until Dr Corona is back in office. They also scheduled for next available with Dr Corona for 5/6/25.

## 2025-03-05 NOTE — CONSULTS
Palliative Care Consult Note     Patient Name: Javier Aaron   YOB: 1945   Medical Record Number: [id number]   Date of visit: 3/5/2025     The 21st Century Cures Act makes medical notes like these available to patients in the interest of transparency. Please be advised this is a medical document. Medical documents are intended to carry relevant information, facts as evident, and the clinical opinion of the practitioner. The medical note is intended as peer to peer communication and may appear blunt or direct. It is written in medical language and may contain abbreviations or verbiage that are unfamiliar.     Chief Complaint/Reason for Visit:  Chief Complaint   Patient presents with    Palliative Care       Reason for Consultation:   I was asked by Dr. Manzo to evaluate patient. Consult requested for evaluation of palliative care needs and Goals of care discussion;Advance care planning / HPOA;Establish palliative care.    Past Medical History/Past Surgical History:   History obtained from [facility] In addition to the patient, PMH/PSH is significant as shown below.    Hx of L breast CA. Mets to bone (R rib). Receiving RT to R rib lesion (will complete RT on 3/7/25).    Onc: Dr Manzo    HPI:      Currently, there is no code status listed in Epic and there are completed Living Will and Healthcare Power of  [HCPOA] documents on file in [facility].     Hospital admissions in past year: 0  ER visits in past year: 1    Patient seen and examined along with her daughter Niecy and son Paul present today. Javier is awake, alert, oriented x 4, answers questions appropriately, is a reliable historian, and is in NAD today.    See ROS.    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)        Medical History:  Past Medical History:    Asthma (HCC)    asthma as a child    Breast CA (HCC)    Breast cancer, left (HCC)    PER nh: LUMPECTOMY 04/2006'; LYMPH NODE LEFT; LT LYMPH NODE DISSECTION, 6 out of 10    Cancer (HCC)    Cataract    Encounter for chemotherapy management    per NG: chemo for breast cancer    Extrinsic asthma, unspecified    Hearing impairment    bilateral hearing aids    Hemorrhoids    History of Papanicolaou smear of cervix    DONE ND NG    Radiation    per NG: Lt breast radiation /Dr Champagne    Seizure disorder (HCC)    1 time event- taking meds for prevention       Surgical History:  Past Surgical History:   Procedure Laterality Date    Breast lumpectomy  04/2006    PER NG: BREAST CANCER LEFT SIDE SEES ONCOLOGY AT [facility] FOR ANNUAL MAMMO    Breast surgery procedure unlisted  04/2006    per NG: Lt lymph node disection. 6 out of 10 affected YEARLY MAMMOGRAMS AT [facility]    Breast surgery procedure unlisted  2011    per NG:right breast calcification removal    Cataract Right 2022    Chemotherapy      Colonoscopy  2010    Hip replacement surgery      AND REVISION 2016    Lumpectomy left      Radiation left      Surgery - external  12/2017    LEFT FEMUR CANCER HARDWARE/MARLI PLACED        Allergies:  Allergies[1]    Family History:  Family History   Problem Relation Age of Onset    Breast Cancer Self 69    Diabetes Mother     Endocrine Disorder Mother 62        per NG: pancreatitis    Hypertension Mother     Polyps Daughter         per NG: colon polyps    Diabetes Maternal Aunt         per NG: Type 2    Diabetes Maternal Uncle         per NG: Type 2    Breast Cancer Cousin      Glaucoma Neg        Social History:  Social History     Socioeconomic History    Marital status:    Tobacco Use    Smoking status: Former     Types: Cigarettes    Smokeless tobacco: Never    Tobacco comments:     Quit when she as 24   Vaping Use    Vaping status: Never Used   Substance and Sexual Activity    Alcohol use: No    Drug use: Never       Palliative Care Social History:    Marital Status:  to Alexander  Children: 2 (Niecy and Paul)  Living Situation: Lives in [facility] with   Does patient live alone: No  Occupational History: Retired; worked for [employer], 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 I discussed the benefits of palliative care to include help with symptom management needs, provide extra layer of support to patient/family, conduct GOC/wishes discussions, and assistance with advance care planning needs. I discussed the differences between palliative care and hospice. I provided education about the Medicare hospice benefit and philosophy (for future reference). Palliative care brochure provided.     We discussed patient's current clinical condition. I provided education about the typical disease trajectory of metastatic breast CA with associated symptoms and decline over time.     I discussed the importance of advance care planning prior to crisis with Javier.     Understanding of diagnosis: Javier verbalizes understanding of cancer diagnosis and plan of  care    Hopes/goals: Remain independent, live in her condo, spend time with family, continue treating cancer on current treatment plan    Fears/concerns: Not mentioned      HCPOA/Health Surrogate:    There is completed HCPOA documentation on file in Epic, however, Javeir just updated HCPOA document in 8/2024. Niecy will email me a copy to scan into chart.   I confirmed that patient's HCPOA is: Niecy Covington (dtr)  #1 successor agent: Paul Aaron (son)      Code Status/POLST Documentation:    I explained sections A, B, and C of the POLST form with patient today. All questions were answered to the best of my ability.     Patient was thankful for the information. Provided Javier and her family wioth a blank POLST form to review. Will contact me with any questions.    Javier wishes to remain FULL CODE at this time.        Written patient education materials provided today: [facility] Teaching Sheet: Palliative Care and Hospice      Medications:  Current Outpatient Medications   Medication Sig Dispense Refill    exemestane 25 MG Oral Tab Take 1 tablet (25 mg total) by mouth daily. 90 tablet 3    mirtazapine 30 MG Oral Tab       levocetirizine 5 MG Oral Tab Take 1 tablet (5 mg total) by mouth every evening. 90 tablet 3    memantine 5 MG Oral Tab Take 1 tablet (5 mg total) by mouth daily. 90 tablet 3    zonisamide 100 MG Oral Cap Take 1 capsule (100 mg total) by mouth in the morning and 1 capsule (100 mg total) before bedtime. 180 capsule 3    pravastatin 40 MG Oral Tab Take 1 tablet (40 mg total) by mouth daily. 90 tablet 3    montelukast 10 MG Oral Tab Take 1 tablet (10 mg total) by mouth daily. 90 tablet 3    Trospium Chloride 20 MG Oral Tab Take 1 tablet (20 mg total) by mouth nightly.      Glucosamine-Chondroit-Vit C-Mn (GLUCOSAMINE 1500 COMPLEX OR) Take 2 capsules by mouth daily.      Multiple Vitamins-Minerals (MULTI FOR HER 50+) Oral Tab Take  by mouth.         Review of Systems:  Review of Systems   Constitutional:  Negative.    HENT: Negative.     Eyes: Negative.    Respiratory: Negative.     Cardiovascular: Negative.    Gastrointestinal: Negative.    Genitourinary: Negative.    Musculoskeletal: Negative.    Skin: Negative.    Neurological:         Has episodes for \"gracefully fainting\" - per Javier, this happens when she is stressed; she usually can feel when she is about to pass out and she sits herself down; usually wakes up without issues and is alert/oriented and knows that she passed out      Follows with Dr. Corona in Neurology   Psychiatric/Behavioral:  Positive for depression. Negative for hallucinations, memory loss, substance abuse and suicidal ideas. The patient is nervous/anxious. The patient does not have insomnia.         Sees Paige Ramírez, Psychiatric APRN @ [facility] in [place]; Paige manages Javier's Mirtazapine dosing       Physical Examination:  Physical Exam  Vitals reviewed.   Constitutional:       General: She is not in acute distress.     Comments: Thin  Sitting comfortably in exam chair  Ambulates with rollator walker     HENT:      Head: Normocephalic and atraumatic.      Right Ear: External ear normal.      Left Ear: External ear normal.      Nose: Nose normal.      Mouth/Throat:      Mouth: Mucous membranes are moist.      Pharynx: Oropharynx is clear.   Eyes:      General: No scleral icterus.     Conjunctiva/sclera: Conjunctivae normal.      Pupils: Pupils are equal, round, and reactive to light.   Pulmonary:      Effort: Pulmonary effort is normal. No respiratory distress.   Abdominal:      General: There is no distension.      Palpations: Abdomen is soft.   Musculoskeletal:         General: Normal range of motion.      Cervical back: Normal range of motion.      Right lower leg: No edema.      Left lower leg: No edema.   Skin:     General: Skin is warm and dry.      Coloration: Skin is pale.   Neurological:      General: No focal deficit present.      Mental Status: She is alert and  oriented to person, place, and time. Mental status is at baseline.      Motor: No weakness.      Gait: Gait abnormal (Ambulates with rollator walker for balance and stability).   Psychiatric:         Mood and Affect: Mood normal.         Behavior: Behavior normal.         Thought Content: Thought content normal.         Judgment: Judgment normal.      Comments: Interactive  Conversational  Decisional       Palliative Care Goals of Care:  Discussed with patient/family today: Yes  Patient's preference about sharing medical information: Patient and family may receive information  Patient's decision making preferences: Fully involved and speak with family  Code status: FULL CODE  Have advanced directives been discussed with patient or healthcare power of : Yes        Healthcare Agent Appointed: Yes  Healthcare Agent's Name: Niecy Covington (dtr)  Healthcare Agent's Phone Number: [phone]          Palliative Care Assessment/Plan:  1. Palliative care encounter    2. Goals of care, counseling/discussion  - Full code    3. Advance care planning  - Full code    4. History of left breast cancer    5. Metastasis to bone (HCC)       Goals of care counseling/discussion  -Pt is FULL CODE  -Continue supportive medical treatments; pt plans to continue pursuing cancer treatment (will complete RT to R rib on 3/7/2025 and continue Exemestane)  -Will send Niecy a list of Care Giving Agencies to look into to help Javier and her  at home as needed with light household chores/preparing meals, etc  -Javier's  Alexander has ALZ, he is still fully functional and living at home  -Continue to follow with Dr. Manzo (Onc) and Dr Corona (Neuro)  -Provided emotional support to pt/family who appear to be coping adequately  -Patient is agreeable to hospitalization, if indicated  -Patient is agreeable to following up with outpatient palliative care  -See above narrative for further details      Advance care planning  counseling/discussion  -Pt is FULL CODE  -Discussed POLST document and code status  -Discussed FULL CODE vs DNAR - Javier wishes to remain FULL CODE status at this time  -Niecy will email me updated HCPOA document reflecting Niecy as HCPOA and Paul as successor agent  -HCPOA/Health Surrogate is   -#1 successor agent:  -#2 successor agent    While discussing goals of care with Javier, she voluntarily participated in an advanced care planning discussion.  Additionally, Niecy and Paul participated in the conversation.  The following was discussed: GOC, HCPOA document, Living Will, POLST, and code status. 15 minutes were spent discussing advanced care planning.  This time was exclusive of the documented time for this visit.    Palliative Performance Scale 70 %    Emotional support was provided to patient and family today: Yes    Palliative Care Follow-up:  I spent a total of  80 minutes with the patient today, which included all of the following:direct face to face contact, history taking, physical examination, and >50% was spent counseling and coordinating care.    Thank you for allowing the Palliative Care Team to participate in the care of your patient. I will continue to follow clinically.    AYLIN LEON DNP, FNP-BC, [facility]  [phone]  3/5/2025    Encounter Times  PreChartin minutes    Reviewing/Obtaining:  10 minutes      Medical Exam:  5 minutes    Plan:  30 minutes      Notes:  10 minutes    Counseling/Education:  20 minutes      Referring/Communicating:   minutes    Ind Interpretation:   minutes      Care Coordination:   minutes       My total time spent caring for the patient on the day of the encounter:   minutes.                [1]   Allergies  Allergen Reactions    Prednisone ANXIETY and OTHER (SEE COMMENTS)     Jittery    Clindamycin      Other reaction(s): CLINDAMYCIN    Penicillins UNKNOWN

## 2025-03-06 NOTE — TELEPHONE ENCOUNTER
Contacted pt son, offered appt with Dr Mcleod 3/18. Pt son advised will check with sister to ensure pt has transportation. Will cb with appt confirmation.

## 2025-03-06 NOTE — TELEPHONE ENCOUNTER
Pt son declined 3/18 appt with dr herrera, doesn't want pt seeing a new provider despite being a one time visit due to canceled clinic, Pt son requested for pt to come in 04/23/2025 at 2:40 PM  with dr brown instead.

## 2025-03-06 NOTE — PROGRESS NOTES
[facility] Radiation Treatment Management Note 1-5    Patient:  Javier Aaron  Age:  79 year old  Visit Diagnosis:    1. Secondary cancer of bone (HCC)      Primary Rad/Onc:  Dr. Davion Wallace    Site Delivered Dose (cGy) Prescribed Dose (cGy) Fraction #   R Ribs 2700 2700 3/3           First treatment date:  3/3/25  Concurrent chemotherapy:  N/A        2/4/2025     3:25 PM 2/18/2025     9:23 AM 3/5/2025     9:21 AM   Oncology Vitals   Height 5' 1\"  5' 1\"   Height 155 cm  155 cm   Weight 102 lb 6.4 oz 98 lb 6.4 oz 98 lb   Weight 46.448 kg 44.634 kg 44.453 kg   BSA (m2) 1.42 m2 1.4 m2 1.4 m2   BMI 19.35 kg/m2 18.59 kg/m2 18.52 kg/m2   /88 145/82 138/75   Pulse 105 101 104   Resp 18 20 18   Temp 97.5 °F (36.4 °C) 97.9 °F (36.6 °C) 99 °F (37.2 °C)   SpO2 97 % 97 % 97 %   Pain Score 0 0 0        Toxicities:  Fatigue Grade 2= Fatigue not relieved by rest limiting instrumental ADL  Bone pain Grade 0= None    Nursing Note:  Increased fatigued, pt felt like she was in a \"fog\" and felt overwhelmed by the fatigued. Felt better the next morning  Had x1 episode of vomiting yesterday, pt feels better, self resolved.    Klaudia WORKMAN RN    Physician Note:  Subjective:  COmpleted RT.  Did well, no issues or c/o.  Toerated without incident.      Objective:  Un[place]egd      Treatment setup imaging have been reviewed:  Yes    Assessment/Plan:    Completed RT    Next visit:  3 months for f/u    Dr. Davion Wallace

## 2025-03-07 ENCOUNTER — APPOINTMENT (OUTPATIENT)
Dept: RADIATION ONCOLOGY | Facility: HOSPITAL | Age: 80
End: 2025-03-07
Attending: RADIOLOGY
Payer: MEDICARE

## 2025-03-07 VITALS
WEIGHT: 98.19 LBS | HEART RATE: 97 BPM | TEMPERATURE: 98 F | RESPIRATION RATE: 18 BRPM | SYSTOLIC BLOOD PRESSURE: 137 MMHG | DIASTOLIC BLOOD PRESSURE: 85 MMHG | BODY MASS INDEX: 19 KG/M2 | OXYGEN SATURATION: 100 %

## 2025-03-07 DIAGNOSIS — C79.51 SECONDARY CANCER OF BONE (HCC): Primary | ICD-10-CM

## 2025-03-07 PROCEDURE — 77373 STRTCTC BDY RAD THER TX DLVR: CPT | Performed by: RADIOLOGY

## 2025-03-07 NOTE — PATIENT INSTRUCTIONS
Follow up with Dr. Vinnie Wallace in 3 months.  Samantha will call you to schedule your follow up appointment.     Please call central scheduling to schedule appointment for a CT scan [phone]    Please call [phone] with any radiation questions.

## 2025-03-10 ENCOUNTER — PATIENT OUTREACH (OUTPATIENT)
Dept: CASE MANAGEMENT | Age: 80
End: 2025-03-10

## 2025-03-10 DIAGNOSIS — M94.9 DISORDER OF BONE AND CARTILAGE: ICD-10-CM

## 2025-03-10 DIAGNOSIS — F32.1 CURRENT MODERATE EPISODE OF MAJOR DEPRESSIVE DISORDER, UNSPECIFIED WHETHER RECURRENT (HCC): ICD-10-CM

## 2025-03-10 DIAGNOSIS — F41.1 GAD (GENERALIZED ANXIETY DISORDER): ICD-10-CM

## 2025-03-10 DIAGNOSIS — M89.9 DISORDER OF BONE AND CARTILAGE: ICD-10-CM

## 2025-03-10 DIAGNOSIS — I10 PRIMARY HYPERTENSION: Primary | ICD-10-CM

## 2025-03-10 DIAGNOSIS — J45.20 MILD INTERMITTENT ASTHMA WITHOUT COMPLICATION (HCC): ICD-10-CM

## 2025-03-10 DIAGNOSIS — G40.309 NONINTRACTABLE GENERALIZED IDIOPATHIC EPILEPSY WITHOUT STATUS EPILEPTICUS (HCC): ICD-10-CM

## 2025-03-10 NOTE — PROGRESS NOTES
3/10/2025  Spoke to Javier for [facility].      Updates to patient care team/ comments: UTD  Patient reported changes in medications: None  Med Adherence  Comment: Taking as prescribed    Health Maintenance:  Reviewed with patient.  Health Maintenance   Topic Date Due    Zoster Vaccines (1 of 2) Never done    COVID-19 Vaccine (7 - 2024-25 season) 09/01/2024    Mammogram  02/19/2025    Annual Physical  10/07/2025    Influenza Vaccine  Completed    DEXA Scan  Completed    Annual Depression Screening  Completed    Fall Risk Screening (Annual)  Completed    Pneumococcal Vaccine: 50+ Years  Completed    Meningococcal B Vaccine  Aged Out    Colorectal Cancer Screening  Discontinued     Patient current concerns: Spoke with the patient, who reports completing radiation therapy last week, undergoing three sessions on Monday, Wednesday, and Friday. Patient states she was informed that a common side effect of the therapy is increased fatigue, which she has noticed. She is scheduled for a DEXA scan and a follow-up with her oncologist on 04/07/25.    Patient is due for her mammogram but is unsure if she should wait to complete the exam due to her current cancer treatment. She will discuss this with oncology at her next appointment.    Patient states her weight has not decreased, and she has incorporated Ensure drinks to help with increased calorie intake.    Patient reports being compliant with her medication regimen, states that her daughter manages and sorts her medications. Patient denies recent falls, she does have gait issues which she uses a walker to ambulate. Patient lives with her  in a condo, she has assistance from her children with appointments, medications, and grocery shopping.   Goals/Action Plan:     Active goal from previous outreach: work on in-home stretches     Patient reported progress towards goals: no progress               - What: work on in-home stretches           - Where/When/How: work on daily  stretches  Patient Reported Barriers and Concerns: no barrriers                   - Plan for overcoming barriers: N/A    Care managers interventions: Reviewed medication list, health maintenance, and care team.   Listened to patient's concerns, provided support, and encouraged her to meet goals. Informed of the importance on reporting any new symptoms to prevent any health complications.   Appointments reviewed with patient.   Future Appointments   Date Time Provider Department Center   3/14/2025  9:40 AM [facility] DEXA RM1 [facility]EX[facility] EM [facility]   4/7/2025 12:30 PM Dominic Manzo MD [facility]   4/7/2025  1:00 PM Tessy Mcmanus APRN [facility] [place]   4/23/2025  2:40 PM Caryn Corona DO [facility] [place] [facility]   5/6/2025  2:20 PM Caryn Corona DO [facility] [place] [facility]   8/25/2025  3:30 PM Dominic Manzo MD [facility]      Next Care Manager Follow Up Date: one month    Reason For Follow Up: review progress and or barriers towards patient's goals.     Time Spent This Encounter Total: 23 min medical record review, telephone communication, care plan updates where needed, education, goals, and action plan recreation/update. Provided acknowledgment and validation to patient's concerns.   Monthly Minute Total including today: 23 min  Physical assessment, complete health history, and need for CCM established by Da Rocha DO.

## 2025-03-14 ENCOUNTER — HOSPITAL ENCOUNTER (OUTPATIENT)
Dept: BONE DENSITY | Facility: HOSPITAL | Age: 80
Discharge: HOME | End: 2025-03-14
Attending: FAMILY MEDICINE
Payer: MEDICARE

## 2025-03-14 DIAGNOSIS — M85.80 OSTEOPENIA, UNSPECIFIED LOCATION: ICD-10-CM

## 2025-03-14 DIAGNOSIS — Z78.0 POSTMENOPAUSAL: ICD-10-CM

## 2025-03-14 PROCEDURE — 77080 DXA BONE DENSITY AXIAL: CPT | Performed by: FAMILY MEDICINE

## 2025-04-04 NOTE — PROGRESS NOTES
Palliative Care Follow Up  Note     Patient Name: Javier Aaron   YOB: 1945   Medical Record Number: [id number]   Date of visit: 4/7/2025     The 21st Century Cures Act makes medical notes like these available to patients in the interest of transparency. Please be advised this is a medical document. Medical documents are intended to carry relevant information, facts as evident, and the clinical opinion of the practitioner. The medical note is intended as peer to peer communication and may appear blunt or direct. It is written in medical language and may contain abbreviations or verbiage that are unfamiliar.     Chief Complaint/Reason for Visit:  Chief Complaint   Patient presents with    Palliative Care     Past Medical History/Past Surgical History:   History obtained from [employer] In addition to the patient, PMH/PSH is significant as shown below.    Hx of L breast CA. Mets to bone (R rib). Receiving RT to R rib lesion (will complete RT on 3/7/25).    Onc: Dr Manzo    HPI:      Hospital admissions in past year: 0  ER visits in past year: 1    Patient seen and examined along with her daughter Niecy and son Paul present today. Javier is awake, alert, oriented x 4, answers questions appropriately, is a reliable historian, and is in NAD today.    See ROS.    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)    Myopia with astigmatism and presbyopia, bilateral    Age-related nuclear cataract of both eyes    Current moderate episode of major depressive disorder (HCC)    NICHO (generalized anxiety disorder)    Loss of consciousness (HCC)    Seasonal allergic rhinitis due to pollen    Visual aura    Tortuous aorta    Nonintractable generalized idiopathic epilepsy without status epilepticus (HCC)    Metastasis to bone (HCC)        Medical  History:  Past Medical History:    Asthma (HCC)    asthma as a child    Breast CA (HCC)    Breast cancer, left (HCC)    PER nh: LUMPECTOMY 04/2006'; LYMPH NODE LEFT; LT LYMPH NODE DISSECTION, 6 out of 10    Cancer (HCC)    Cataract    Encounter for chemotherapy management    per NG: chemo for breast cancer    Extrinsic asthma, unspecified    Hearing impairment    bilateral hearing aids    Hemorrhoids    History of Papanicolaou smear of cervix    DONE [place] NG    Radiation    per NG: Lt breast radiation /Dr Champagne    Seizure disorder (HCC)    1 time event- taking meds for prevention       Surgical History:  Past Surgical History:   Procedure Laterality Date    Breast lumpectomy  04/2006    PER NG: BREAST CANCER LEFT SIDE SEES ONCOLOGY AT [facility] FOR ANNUAL MAMMO    Breast surgery procedure unlisted  04/2006    per NG: Lt lymph node disection. 6 out of 10 affected YEARLY MAMMOGRAMS AT [facility]    Breast surgery procedure unlisted  2011    per NG:right breast calcification removal    Cataract Right 2022    Chemotherapy      Colonoscopy  2010    Hip replacement surgery      AND REVISION 2016    Lumpectomy left      Radiation left      Surgery - external  12/2017    LEFT FEMUR CANCER HARDWARE/MARLI PLACED        Allergies:  Allergies[1]    Family History:  Family History   Problem Relation Age of Onset    Breast Cancer Self 71    Diabetes Mother     Endocrine Disorder Mother 62        per NG: pancreatitis    Hypertension Mother     Polyps Daughter         per NG: colon polyps    Diabetes Maternal Aunt         per NG: Type 2    Diabetes Maternal Uncle         per NG: Type 2    Breast Cancer Cousin     Glaucoma Neg        Social History:  Social History     Socioeconomic History    Marital status:    Tobacco Use    Smoking status: Former     Types: Cigarettes    Smokeless tobacco: Never    Tobacco comments:     Quit when she as 24   Vaping Use    Vaping status: Never Used   Substance and Sexual Activity    Alcohol use: No    Drug  use: Never     Goals of care counseling/advance care planning discussion:     We discussed patient's current clinical condition. I provided education about the typical disease trajectory of metastatic breast CA with associated symptoms and decline over time.     I discussed the importance of advance care planning prior to crisis with Javier.     Understanding of diagnosis: Javier verbalizes understanding of cancer diagnosis and plan of care    Hopes/goals: Remain independent, live in her condo, spend time with family, continue treating cancer on current treatment plan    Fears/concerns: Not mentioned      HCPOA/Health Surrogate:    There is completed HCPOA documentation on file in Epic, however, Javier just updated HCPOA document in 8/2024. Niecy will email me a copy to scan into chart.   I confirmed that patient's HCPOA is: Niecy Covington (dtr)  #1 successor agent: Paul Aaron (son)      Code Status/POLST Documentation:    Javier wishes to remain FULL CODE at this time.        Medications:  Current Outpatient Medications   Medication Sig Dispense Refill    exemestane 25 MG Oral Tab Take 1 tablet (25 mg total) by mouth daily. 90 tablet 3    Calcium 600 MG Oral Tab Take 600 mg by mouth 2 (two) times daily. 180 tablet 3    mirtazapine 30 MG Oral Tab       levocetirizine 5 MG Oral Tab Take 1 tablet (5 mg total) by mouth every evening. 90 tablet 3    memantine 5 MG Oral Tab Take 1 tablet (5 mg total) by mouth daily. 90 tablet 3    zonisamide 100 MG Oral Cap Take 1 capsule (100 mg total) by mouth in the morning and 1 capsule (100 mg total) before bedtime. 180 capsule 3    pravastatin 40 MG Oral Tab Take 1 tablet (40 mg total) by mouth daily. 90 tablet 3    montelukast 10 MG Oral Tab Take 1 tablet (10 mg total) by mouth daily. 90 tablet 3    Trospium Chloride 20 MG Oral Tab Take 1 tablet (20 mg total) by mouth nightly.      Glucosamine-Chondroit-Vit C-Mn (GLUCOSAMINE 1500 COMPLEX OR) Take 2 capsules by mouth daily.       Multiple Vitamins-Minerals (MULTI FOR HER 50+) Oral Tab Take  by mouth.         Review of Systems:  Review of Systems   Constitutional: Negative.    HENT: Negative.     Eyes: Negative.    Respiratory: Negative.     Cardiovascular: Negative.    Gastrointestinal: Negative.    Genitourinary: Negative.    Musculoskeletal: Negative.    Skin: Negative.    Neurological:         Previously reported that she has episodes for \"gracefully fainting\" - per Javier, this happens when she is stressed; she usually can feel when she is about to pass out and she sits herself down; usually wakes up without issues and is alert/oriented and knows that she passed out      Follows with Dr. Corona in Neurology   Psychiatric/Behavioral:  Positive for depression. Negative for hallucinations, memory loss, substance abuse and suicidal ideas. The patient is nervous/anxious. The patient does not have insomnia.         Sees Paige Ramírez, Psychiatric APRN @ [facility] in [place]; Paige manages Javier's Mirtazapine dosing       Physical Examination:  Physical Exam  Vitals reviewed.   Constitutional:       General: She is not in acute distress.     Comments: Thin  Sitting comfortably in exam chair  Ambulates with rollator walker     HENT:      Head: Normocephalic and atraumatic.      Right Ear: External ear normal.      Left Ear: External ear normal.      Nose: Nose normal.      Mouth/Throat:      Mouth: Mucous membranes are moist.      Pharynx: Oropharynx is clear.   Eyes:      General: No scleral icterus.     Conjunctiva/sclera: Conjunctivae normal.      Pupils: Pupils are equal, round, and reactive to light.   Pulmonary:      Effort: Pulmonary effort is normal. No respiratory distress.   Abdominal:      General: There is no distension.      Palpations: Abdomen is soft.   Musculoskeletal:         General: Normal range of motion.      Cervical back: Normal range of motion.      Right lower leg: No edema.      Left lower leg: No edema.    Skin:     General: Skin is warm and dry.      Coloration: Skin is pale.   Neurological:      General: No focal deficit present.      Mental Status: She is alert and oriented to person, place, and time. Mental status is at baseline.      Motor: No weakness.      Gait: Gait abnormal (Ambulates with rollator walker for balance and stability).   Psychiatric:         Mood and Affect: Mood normal.         Behavior: Behavior normal.         Thought Content: Thought content normal.         Judgment: Judgment normal.      Comments: Interactive  Conversational  Decisional       Palliative Care Goals of Care:  Discussed with patient/family today: Yes  Patient's preference about sharing medical information: Patient and family may receive information  Patient's decision making preferences: Fully involved and speak with family  Code status: FULL CODE  Have advanced directives been discussed with patient or healthcare power of : Yes        Healthcare Agent Appointed: Yes  Healthcare Agent's Name: Niecy Covington (dtr)  Healthcare Agent's Phone Number: [phone]          Palliative Care Assessment/Plan:  1. Palliative care encounter    2. Goals of care, counseling/discussion    3. Advance care planning    4. History of left breast cancer    5. Metastasis to bone (HCC)    6. Malignant neoplasm of left breast in female, estrogen receptor positive, unspecified site of breast (HCC)       Goals of care counseling/discussion  -Pt is FULL CODE  -Completed RT to R rib on 3/7/2025  -Will be starting Prolia soon per Dr. Manzo  -Sent Niecy a list of Care Giving Agencies to look into to help Javier and her  at home as needed with light household chores/preparing meals, etc  -Javier's  Alexander has ALZ, he is still fully functional and living at home  -Continue to follow with Dr. Manzo (Onc) and Dr Corona (Neuro)  -Provided emotional support to pt/family who appear to be coping adequately  -Patient is agreeable to  hospitalization, if indicated  -Patient is agreeable to following up with outpatient palliative care  -See above narrative for further details      Advance care planning counseling/discussion  -Pt is FULL CODE  -HCPOA: Niecyatul Covington (dtr) and Delano Aaron (son)    Palliative Performance Scale 70 %    Emotional support was provided to patient and family today: Yes    Palliative Care Follow-up:  I spent a total of  30 minutes with the patient today, which included all of the following:direct face to face contact, history taking, physical examination, and >50% was spent counseling and coordinating care.    Thank you for allowing the Palliative Care Team to participate in the care of your patient. I will continue to follow clinically.    AYLIN LEON DNP, FNP-BC, [facility]  [phone]  4/7/2025                  [1]   Allergies  Allergen Reactions    Prednisone ANXIETY and OTHER (SEE COMMENTS)     Jittery    Clindamycin      Other reaction(s): CLINDAMYCIN    Penicillins UNKNOWN

## 2025-04-07 ENCOUNTER — OFFICE VISIT (OUTPATIENT)
Age: 80
End: 2025-04-07
Attending: INTERNAL MEDICINE
Payer: MEDICARE

## 2025-04-07 VITALS
TEMPERATURE: 98 F | BODY MASS INDEX: 18.47 KG/M2 | HEIGHT: 61 IN | DIASTOLIC BLOOD PRESSURE: 87 MMHG | WEIGHT: 97.81 LBS | RESPIRATION RATE: 18 BRPM | OXYGEN SATURATION: 99 % | HEART RATE: 90 BPM | SYSTOLIC BLOOD PRESSURE: 138 MMHG

## 2025-04-07 VITALS
HEIGHT: 61 IN | WEIGHT: 97 LBS | SYSTOLIC BLOOD PRESSURE: 138 MMHG | OXYGEN SATURATION: 99 % | BODY MASS INDEX: 18.31 KG/M2 | TEMPERATURE: 98 F | DIASTOLIC BLOOD PRESSURE: 87 MMHG | HEART RATE: 90 BPM | RESPIRATION RATE: 18 BRPM

## 2025-04-07 DIAGNOSIS — C50.912 MALIGNANT NEOPLASM OF LEFT BREAST IN FEMALE, ESTROGEN RECEPTOR POSITIVE, UNSPECIFIED SITE OF BREAST (HCC): ICD-10-CM

## 2025-04-07 DIAGNOSIS — Z17.0 MALIGNANT NEOPLASM OF LEFT BREAST IN FEMALE, ESTROGEN RECEPTOR POSITIVE, UNSPECIFIED SITE OF BREAST (HCC): Primary | ICD-10-CM

## 2025-04-07 DIAGNOSIS — C50.912 MALIGNANT NEOPLASM OF LEFT BREAST IN FEMALE, ESTROGEN RECEPTOR POSITIVE, UNSPECIFIED SITE OF BREAST (HCC): Primary | ICD-10-CM

## 2025-04-07 DIAGNOSIS — Z85.3 HISTORY OF LEFT BREAST CANCER: ICD-10-CM

## 2025-04-07 DIAGNOSIS — Z51.5 PALLIATIVE CARE ENCOUNTER: Primary | ICD-10-CM

## 2025-04-07 DIAGNOSIS — C79.51 METASTASIS TO BONE (HCC): ICD-10-CM

## 2025-04-07 DIAGNOSIS — Z17.0 MALIGNANT NEOPLASM OF LEFT BREAST IN FEMALE, ESTROGEN RECEPTOR POSITIVE, UNSPECIFIED SITE OF BREAST (HCC): ICD-10-CM

## 2025-04-07 DIAGNOSIS — Z78.0 OSTEOPENIA AFTER MENOPAUSE: ICD-10-CM

## 2025-04-07 DIAGNOSIS — Z71.89 GOALS OF CARE, COUNSELING/DISCUSSION: ICD-10-CM

## 2025-04-07 DIAGNOSIS — M85.80 OSTEOPENIA AFTER MENOPAUSE: ICD-10-CM

## 2025-04-07 DIAGNOSIS — Z71.89 ADVANCE CARE PLANNING: ICD-10-CM

## 2025-04-07 RX ORDER — EXEMESTANE 25 MG/1
25 TABLET ORAL DAILY
Qty: 90 TABLET | Refills: 3 | Status: SHIPPED | OUTPATIENT
Start: 2025-04-07

## 2025-04-07 NOTE — PROGRESS NOTES
HPI   Javier Aaron is a 79 year old female here for follow up of   Encounter Diagnoses   Name Primary?    Malignant neoplasm of left breast in female, estrogen receptor positive, unspecified site of breast (HCC) Yes    Metastasis to bone (HCC)      Patient completed palliative radiation to the single site of progression on the right second rib, treatment from 3/3/2025 through 3/7/2025.    She is started exemestane.  Has been compliant.      States she has fatigue that is from the RT and improving.      She was also having some mood swings.  Not sure if it was from the fatigue from the RT or from the exemestane.      No hot flashes and no arthralgias.     She completed DEXA on 3/14/2025.    ECOG performance status  1      Review of Systems:   Review of Systems   Constitutional:  Positive for fatigue. Negative for appetite change and unexpected weight change.   Respiratory:  Negative for cough and shortness of breath.    Cardiovascular:  Negative for chest pain.   Gastrointestinal:  Negative for abdominal pain.   Endocrine: Negative for hot flashes.   Genitourinary:  Negative for dysuria and frequency.    Musculoskeletal:  Positive for gait problem (uses walker). Negative for arthralgias, back pain and neck pain.   Skin:  Negative for rash.   Neurological:  Positive for gait problem (uses walker). Negative for dizziness and headaches.   Hematological:  Negative for adenopathy.   Psychiatric/Behavioral:  Negative for sleep disturbance. The patient is nervous/anxious (over wheter the lesion is gone from the RT.  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          Current Outpatient Medications   Medication Sig Dispense Refill    Calcium 600 MG Oral Tab Take 600 mg by mouth 2 (two) times daily. 180 tablet 3    exemestane 25 MG Oral Tab Take 1 tablet (25 mg total) by mouth daily. 90 tablet 3    mirtazapine 30 MG Oral Tab       levocetirizine 5 MG Oral Tab Take 1 tablet (5 mg total) by mouth every evening. 90 tablet  3    memantine 5 MG Oral Tab Take 1 tablet (5 mg total) by mouth daily. 90 tablet 3    zonisamide 100 MG Oral Cap Take 1 capsule (100 mg total) by mouth in the morning and 1 capsule (100 mg total) before bedtime. 180 capsule 3    pravastatin 40 MG Oral Tab Take 1 tablet (40 mg total) by mouth daily. 90 tablet 3    montelukast 10 MG Oral Tab Take 1 tablet (10 mg total) by mouth daily. 90 tablet 3    Trospium Chloride 20 MG Oral Tab Take 1 tablet (20 mg total) by mouth nightly.      Glucosamine-Chondroit-Vit C-Mn (GLUCOSAMINE 1500 COMPLEX OR) Take 2 capsules by mouth daily.      Multiple Vitamins-Minerals (MULTI FOR HER 50+) Oral Tab Take  by mouth.       Allergies:   Allergies   Allergen Reactions    Prednisone ANXIETY and OTHER (SEE COMMENTS)     Jittery    Clindamycin      Other reaction(s): CLINDAMYCIN    Penicillins UNKNOWN       Past Medical History:    Asthma (HCC)    asthma as a child    Breast CA (HCC)    Breast cancer, left (HCC)    PER nh: LUMPECTOMY 04/2006'; LYMPH NODE LEFT; LT LYMPH NODE DISSECTION, 6 out of 10    Cancer (HCC)    Cataract    Encounter for chemotherapy management    per NG: chemo for breast cancer    Extrinsic asthma, unspecified    Hearing impairment    bilateral hearing aids    Hemorrhoids    History of Papanicolaou smear of cervix    DONE [place] NG    Radiation    per NG: Lt breast radiation /Dr Champagne    Seizure disorder (HCC)    1 time event- taking meds for prevention     Past Surgical History:   Procedure Laterality Date    Breast lumpectomy  04/2006    PER NG: BREAST CANCER LEFT SIDE SEES ONCOLOGY AT [facility] FOR ANNUAL MAMMO    Breast surgery procedure unlisted  04/2006    per NG: Lt lymph node disection. 6 out of 10 affected YEARLY MAMMOGRAMS AT [facility]    Breast surgery procedure unlisted  2011    per NG:right breast calcification removal    Cataract Right 2022    Chemotherapy      Colonoscopy  2010    Hip replacement surgery      AND REVISION 2016    Lumpectomy left      Radiation left       Surgery - external  12/2017    LEFT FEMUR CANCER HARDWARE/MARLI PLACED      Social History     Socioeconomic History    Marital status:    Tobacco Use    Smoking status: Former     Types: Cigarettes    Smokeless tobacco: Never    Tobacco comments:     Quit when she as 24   Vaping Use    Vaping status: Never Used   Substance and Sexual Activity    Alcohol use: No    Drug use: Never   Other Topics Concern    Caffeine Concern Yes     Comment: per NG: chocolate; tea, 2 cups daily    Reaction to local anesthetic No    Pt has a pacemaker No    Pt has a defibrillator No     Social Drivers of Health     Food Insecurity: No Food Insecurity (1/2/2025)    Food Insecurity     Food Insecurity: Never true   Transportation Needs: No Transportation Needs (1/2/2025)    Transportation Needs     Lack of Transportation: No   Housing Stability: Low Risk  (1/2/2025)    Housing Stability     Housing Instability: No         Family History   Problem Relation Age of Onset    Breast Cancer Self 71    Diabetes Mother     Endocrine Disorder Mother 62        per NG: pancreatitis    Hypertension Mother     Polyps Daughter         per NG: colon polyps    Diabetes Maternal Aunt         per NG: Type 2    Diabetes Maternal Uncle         per NG: Type 2    Breast Cancer Cousin     Glaucoma Neg          PHYSICAL EXAM:    /87 (BP Location: Right arm, Patient Position: Sitting, Cuff Size: child)   Pulse 90   Temp 97.6 °F (36.4 °C) (Oral)   Resp 18   Ht 1.549 m (5' 1\")   Wt 44.4 kg (97 lb 12.8 oz)   SpO2 99%   BMI 18.48 kg/m²   Wt Readings from Last 6 Encounters:   04/07/25 44.4 kg (97 lb 12.8 oz)   03/07/25 44.5 kg (98 lb 3.2 oz)   03/05/25 44.5 kg (98 lb)   02/18/25 44.6 kg (98 lb 6.4 oz)   02/04/25 46.4 kg (102 lb 6.4 oz)   01/27/25 46.2 kg (101 lb 12.8 oz)     Physical Exam  General: Patient is alert, not in acute distress.  HEENT: EOMs intact. PERRL.   Neck: No JVD. No palpable lymphadenopathy. Neck is supple.  Chest: Clear to  auscultation.  Heart: Regular rate and rhythm.   Abdomen: Soft, non tender with good bowel sounds.  Extremities: No edema.  Neurological: Grossly intact.   Lymphatics: There is no palpable lymphadenopathy throughout in the cervical, supraclavicular, axillary, or inguinal regions.  Psych/Depression: nl        ASSESSMENT/PLAN:     1. Malignant neoplasm of left breast in female, estrogen receptor positive, unspecified site of breast (HCC)    2. Metastasis to bone (HCC)        H/o breast cancer with left lumpectomy in year 2006 treated with radiation therapy.  Per record this was ER/MO positive, and she was treated in the past with letrozole.  She had a benign surgical biopsy of the retroareolar right breast for an intraductal papilloma.      On 2/19/2024 BI-RADS 2, she will be due for follow-up imaging in February 2025.      Patient had been worked up by primary care doctor for weight loss.  Her most recent weights have been stable.  She had a CT scan of the chest, abdomen and pelvis for evaluation for weight loss which was on 1/23/2025.  This showed the right second rib with a fusiform expansile lesion concerning for bone metastases.  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      She has prior h/o chondrosarcoma grade I of the LE.    Lesion on bone scan was consistent with osseous metastatic disease.  Given that she had ER positive breast cancer in the past, discussed proceeding with a PET Cer[place]na to determine if this is consistent with breast cancer that is ER positive.  If negative, would then proceed with the biopsy in order to have tissue for next generation sequencing and for treatment options.    Given that this is the only site and if this is the case with the PET Cerianna, being oligometastatic disease, would recommend radiation oncology evaluation to treat the site, and would then recommend resuming treatment with an aromatase inhibitor, likely exemestane.   Would recommend single agent therapy, given that tumor burden will be low after treating the site with radiation, and would be better tolerated with less side effects and adding a CDK 4/6 for such a low tumor burden.  Will reserve that for further progression in the future.    In the future, may have NGS testing.  If confirm MBC with above testing, recommend genetic testing for evaluation of a BRCA mutation for treatment options.     Patient completed palliative radiation to the second rib metastatic lesion.  She is on exemestane and has been taking it daily.  Will proceed with CT scan of the chest, which was ordered by Dr. Wallace, and bone scan in June 2025 to assess response to therapy.  Order will be given today, patient to follow-up in June with above imaging.    Osteopenia: DEXA on 3/14/2025 consistent with osteopenia with increased risk of fractures.  She we will proceed with Prolia every 6 months and repeat DEXA in March 2027.  Patient then completed dental evaluation.  Patient to take vitamin D supplementation 2000 IU daily and a daily calcium supplement..    Given that she has metastatic disease, no further mammograms recommended.      No follow-ups on file.    MDM moderate risk  Continuity of complex medical care  No orders of the defined types were placed in this encounter.      Results From Past 48 Hours:  No results found for this or any previous visit (from the past 48 hours).        Imaging & Referrals:  None   No orders of the defined types were placed in this encounter.    Procedure Component Value Ref Range Date/Time   XR DEXA BONE DENSITOMETRY (CPT=77080) [[id number]] Collected: 03/14/25 1252   Order Status: Completed Updated: 03/14/25 1253   Narrative:     PROCEDURE: XR DEXA BONE DENSITOMETRY (CPT=77080)     COMPARISON: [facility], XR DEXA BONE DENSITOMETRY (CPT=77080), 11/01/2021, 2:58 PM.     INDICATIONS: Postmenopausal M85.80 Osteopenia, unspecified location      TECHNIQUE: Measurement of bone mineral density of the lumbar spine and hip was performed on a Hologic dual energy x-ray absorptiometry scanner.     FINDINGS:     LEFT FEMORAL NECK  BMD: 0.635 gm/sq. cm. T SCORE: -1.9 Z SCORE: 0.3     Change:  Bone mineral density has decreased by 2.1% compared to 11/01/2021.     LEFT TOTAL HIP  BMD: 0.750 gm/sq. cm. T SCORE: -1.6 Z SCORE: 0.3     Change:  Bone mineral density has decreased by 7.9% compared to 11/01/2021.     PA LUMBAR SPINE (L1 - L4)  BMD: 0.909 gm/sq. cm. T SCORE: -1.3 Z SCORE: 1.3     Change:  Bone mineral density has decreased by 2.8% compared to 11/01/2021.        T scores are a comparison to sex-matched patients with mean peak bone mass and are given in standard deviation (s.d.).  Each 1 s.d. corresponds to approximately 10% below peak normal bone density.       WORLD HEALTH ORGANIZATION CRITERIA  NORMAL T SCORE: Above -1 s.d.    OSTEOPENIA T SCORE: Between -1 and -2.5 s.d.    OSTEOPOROSIS T SCORE: -2.5 s.d.     National Osteoporosis Foundation Clinician's Guide to Prevention and Treatment of Osteoporosis recommendations for treatment:  Post menopausal women and men age 50 and older presenting with the following should be considered for treatment:  * A hip or vertebral (clinical or morphometric) fracture  * T score < -2.5 at the femoral neck or spine after appropriate evaluation to exclude secondary causes.  * Low bone mass (T score between -1.0 and -2.5 at the femoral neck or spine) and a 10-year probability of a hip fracture > 3% or a 10-year probability of a major osteoporosis-related fracture > 20% based on the [place]-adapted WHO algorithm               Impression:     CONCLUSION:  1. Osteopenia, which according to World Health Organization criteria places the patient at a mild-to-moderately increased risk for fracture.     2. Based on right femoral neck bone mineral density, the FRAX 10 year probability of a major osteoporotic fracture is 11% and the 10 year  probability of a hip fracture is 3.3%.     3. Compared to the prior study, bone mineral density has decreased in the lumbar spine and right hip.              Dictated by (CST): Zain Rg MD on 3/14/2025 at 12:51 PM      Finalized by (CST): Zain Rg MD on 3/14/2025 at 12:52 PM

## 2025-04-08 ENCOUNTER — TELEPHONE (OUTPATIENT)
Age: 80
End: 2025-04-08

## 2025-04-08 NOTE — TELEPHONE ENCOUNTER
Daughter called and left message that Prolia was authorized and will schedule once patient has dental clearance.

## 2025-04-11 ENCOUNTER — PATIENT OUTREACH (OUTPATIENT)
Dept: CASE MANAGEMENT | Age: 80
End: 2025-04-11

## 2025-04-11 DIAGNOSIS — M89.9 DISORDER OF BONE AND CARTILAGE: ICD-10-CM

## 2025-04-11 DIAGNOSIS — G40.309 NONINTRACTABLE GENERALIZED IDIOPATHIC EPILEPSY WITHOUT STATUS EPILEPTICUS (HCC): ICD-10-CM

## 2025-04-11 DIAGNOSIS — J45.20 MILD INTERMITTENT ASTHMA WITHOUT COMPLICATION (HCC): ICD-10-CM

## 2025-04-11 DIAGNOSIS — I10 PRIMARY HYPERTENSION: Primary | ICD-10-CM

## 2025-04-11 DIAGNOSIS — M94.9 DISORDER OF BONE AND CARTILAGE: ICD-10-CM

## 2025-04-11 DIAGNOSIS — F41.1 GAD (GENERALIZED ANXIETY DISORDER): ICD-10-CM

## 2025-04-11 DIAGNOSIS — F32.1 CURRENT MODERATE EPISODE OF MAJOR DEPRESSIVE DISORDER, UNSPECIFIED WHETHER RECURRENT (HCC): ICD-10-CM

## 2025-04-11 NOTE — PROGRESS NOTES
4/11/2025  Spoke to Javier for [facility].      Updates to patient care team/ comments: UTD  Patient reported changes in medications: None  Med Adherence  Comment: Taking as prescribed    Health Maintenance:  Reviewed with patient   Health Maintenance   Topic Date Due    Zoster Vaccines (1 of 2) Never done    COVID-19 Vaccine (7 - 2024-25 season) 09/01/2024    Mammogram  02/19/2025 (no further testing is needed)    Annual Physical  10/07/2025    Influenza Vaccine  Completed    DEXA Scan  Completed    Annual Depression Screening  Completed    Fall Risk Screening (Annual)  Completed    Pneumococcal Vaccine: 50+ Years  Completed    Meningococcal B Vaccine  Aged Out    Colorectal Cancer Screening  Discontinued     Patient current concerns:  Spoke with the patient, who voiced no new health concerns. She followed up with oncology on 04/07/25. Patient will need to repeat a CT chest and bone scan in two months.      Patient does not recall discussing future mammograms with Dr. Manzo.   Patient completed palliative radiation to the second rib metastatic lesion.  She is on exemestane and has been taking it daily.  Will proceed with CT scan of the chest, which was ordered by Dr. Wallace, and bone scan in June 2025 to assess response to therapy.  Order will be given today, patient to follow-up in June with above imaging.     Osteopenia: DEXA on 3/14/2025 consistent with osteopenia with increased risk of fractures.  She we will proceed with Prolia every 6 months and repeat DEXA in March 2027.  Patient then completed dental evaluation.  Patient to take vitamin D supplementation 2000 IU daily and a daily calcium supplement..     Given that she has metastatic disease, no further mammograms recommended.  Finalized by (CST): Zain Rg MD on 3/14/2025 at 12:52 PM            Electronically signed by Dominic Manzo MD at 4/7/2025  1:23 PM    Patient states her weight has not decreased, and she has incorporated Ensure drinks to help with  increased calorie intake.  Wt Readings from Last 6 Encounters:   04/07/25 97 lb   04/07/25 97 lb 12.8 oz   03/07/25 98 lb 3.2 oz   03/05/25 98 lb   02/18/25 98 lb 6.4 oz   02/04/25 102 lb 6.4 oz     Patient reports being compliant with her medication regimen, states that her daughter manages and sorts her medications.   Patient denies recent falls, she does have gait issues which she uses a walker to ambulate. Patient lives with her  in a condo, she has assistance from her children with appointments, medications, and grocery shopping.   Goals/Action Plan:     Active goal from previous outreach: work on in-home stretches     Patient reported progress towards goals: no progress               - What: work on in-home stretches           - Where/When/How: work on daily stretches  Patient Reported Barriers and Concerns: no barrriers                   - Plan for overcoming barriers: N/A     Care managers interventions: Reviewed medication list, care team, and updated health maintenance.   Listened to patient's concerns, provided support, and encouraged her to meet goals. Informed of the importance on reporting any new symptoms to prevent any health complications.   Appointments reviewed with patient.   Future Appointments   Date Time Provider Department Center   4/23/2025  2:40 PM Caryn Corona DO [facility]   5/6/2025  2:20 PM Caryn Corona DO [facility]   5/30/2025  8:00 AM [facility] CT RM3 [facility] CT [facility] [place]   5/30/2025  9:45 AM [facility] NM RM7 DOSE RM [facility] NM [facility] [place]   5/30/2025 12:45 PM [facility] NM RM4 [facility] NM [facility] [place]   6/20/2025 10:30 AM Dominic Manzo MD [facility] Care Manager Follow Up Date: One month     Reason For Follow Up: review progress and or barriers towards patient's goals.     Time Spent This Encounter Total: 24 min medical record review, telephone communication, care plan updates where needed, education, goals, and action plan recreation/update.  Provided acknowledgment and validation to patient's concerns.   Monthly Minute Total including today: 24 min  Physical assessment, complete health history, and need for CCM established by Da Rocha DO.

## 2025-04-23 ENCOUNTER — LAB ENCOUNTER (OUTPATIENT)
Dept: LAB | Facility: HOSPITAL | Age: 80
End: 2025-04-23
Attending: Other
Payer: MEDICARE

## 2025-04-23 ENCOUNTER — TELEPHONE (OUTPATIENT)
Age: 80
End: 2025-04-23

## 2025-04-23 DIAGNOSIS — G62.9 LENGTH-DEPENDENT PERIPHERAL NEUROPATHY: ICD-10-CM

## 2025-04-23 PROBLEM — H52.203 UNSPECIFIED ASTIGMATISM, BILATERAL: Status: ACTIVE | Noted: 2024-01-01

## 2025-04-23 PROBLEM — T45.1X5D ADVERSE EFFECT OF ANTINEOPLASTIC AND IMMUNOSUPPRESSIVE DRUGS, SUBSEQUENT ENCOUNTER: Status: ACTIVE | Noted: 2024-01-01

## 2025-04-23 PROBLEM — G47.00 INSOMNIA, UNSPECIFIED: Status: ACTIVE | Noted: 2024-07-17

## 2025-04-23 PROBLEM — Z85.3 PERSONAL HISTORY OF MALIGNANT NEOPLASM OF BREAST: Status: ACTIVE | Noted: 2024-01-01

## 2025-04-23 PROBLEM — I34.1 MITRAL VALVE POSTERIOR LEAFLET PROLAPSE: Status: ACTIVE | Noted: 2021-07-27

## 2025-04-23 PROBLEM — Z86.711 PERSONAL HISTORY OF PULMONARY EMBOLISM: Status: ACTIVE | Noted: 2024-01-01

## 2025-04-23 PROBLEM — Z96.1 HISTORY OF INTRAOCULAR LENS IMPLANT: Status: ACTIVE | Noted: 2023-01-26

## 2025-04-23 PROBLEM — I36.1 NONRHEUMATIC TRICUSPID (VALVE) INSUFFICIENCY: Status: ACTIVE | Noted: 2021-07-27

## 2025-04-23 PROBLEM — F43.23 ADJUSTMENT DISORDER WITH MIXED ANXIETY AND DEPRESSED MOOD: Status: ACTIVE | Noted: 2025-04-23

## 2025-04-23 PROBLEM — Q25.46: Status: ACTIVE | Noted: 2024-07-17

## 2025-04-23 PROBLEM — H04.123 DRY EYES: Status: ACTIVE | Noted: 2024-11-18

## 2025-04-23 PROBLEM — G40.802 OTHER EPILEPSY, NOT INTRACTABLE, WITHOUT STATUS EPILEPTICUS (HCC): Status: ACTIVE | Noted: 2024-07-17

## 2025-04-23 PROBLEM — I10 HYPERTENSION: Status: ACTIVE | Noted: 2021-07-27

## 2025-04-23 PROBLEM — R00.0 SINUS TACHYCARDIA: Status: ACTIVE | Noted: 2025-04-23

## 2025-04-23 PROBLEM — H52.13 MYOPIA, BILATERAL: Status: ACTIVE | Noted: 2024-01-01

## 2025-04-23 PROBLEM — Z91.81 HISTORY OF FALLING: Status: ACTIVE | Noted: 2024-07-17

## 2025-04-23 PROBLEM — G43.B0 OPHTHALMOPLEGIC MIGRAINE, NOT INTRACTABLE: Status: ACTIVE | Noted: 2024-01-01

## 2025-04-23 PROBLEM — G62.0 DRUG-INDUCED POLYNEUROPATHY (HCC): Status: ACTIVE | Noted: 2024-01-01

## 2025-04-23 PROBLEM — Z90.12 ACQUIRED ABSENCE OF LEFT BREAST AND NIPPLE: Status: ACTIVE | Noted: 2024-07-17

## 2025-04-23 PROBLEM — D62 ACUTE BLOOD LOSS ANEMIA: Status: ACTIVE | Noted: 2025-04-23

## 2025-04-23 PROBLEM — J30.1 ALLERGIC RHINITIS DUE TO POLLEN: Status: ACTIVE | Noted: 2024-07-17

## 2025-04-23 PROBLEM — R55 VASOVAGAL SYNCOPE: Status: ACTIVE | Noted: 2021-07-27

## 2025-04-23 PROBLEM — M26.69: Status: ACTIVE | Noted: 2024-11-21

## 2025-04-23 PROBLEM — E78.5 HYPERLIPIDEMIA: Status: ACTIVE | Noted: 2025-04-23

## 2025-04-23 PROBLEM — F32.1 MAJOR DEPRESSIVE DISORDER, SINGLE EPISODE, MODERATE (HCC): Status: ACTIVE | Noted: 2024-01-01

## 2025-04-23 PROBLEM — Z87.891 PERSONAL HISTORY OF NICOTINE DEPENDENCE: Status: ACTIVE | Noted: 2024-01-01

## 2025-04-23 PROBLEM — J45.909 UNSPECIFIED ASTHMA, UNCOMPLICATED (HCC): Status: ACTIVE | Noted: 2024-01-01

## 2025-04-23 PROBLEM — H26.9 CORTICAL CATARACT: Status: ACTIVE | Noted: 2023-02-08

## 2025-04-23 PROBLEM — H52.4 PRESBYOPIA: Status: ACTIVE | Noted: 2024-01-01

## 2025-04-23 PROBLEM — F41.0 PANIC DISORDER (EPISODIC PAROXYSMAL ANXIETY): Status: ACTIVE | Noted: 2024-01-01

## 2025-04-23 PROBLEM — H25.9 AGE-RELATED CATARACT: Status: ACTIVE | Noted: 2022-10-27

## 2025-04-23 PROCEDURE — 86341 ISLET CELL ANTIBODY: CPT

## 2025-04-23 PROCEDURE — 86255 FLUORESCENT ANTIBODY SCREEN: CPT

## 2025-04-23 PROCEDURE — 36415 COLL VENOUS BLD VENIPUNCTURE: CPT

## 2025-04-23 PROCEDURE — 86596 VOLTAGE-GTD CA CHNL ANTB EA: CPT

## 2025-04-23 PROCEDURE — 86051 AQUAPORIN-4 ANTB ELISA: CPT

## 2025-04-23 NOTE — TELEPHONE ENCOUNTER
Spoke with patient and she states she is on the fence with having this done and she will call back once she is ready to proceed. Informed Dr Manzo and staff.

## 2025-05-01 ENCOUNTER — TELEPHONE (OUTPATIENT)
Dept: NEUROLOGY | Facility: CLINIC | Age: 80
End: 2025-05-01

## 2025-05-05 RX ORDER — PRAVASTATIN SODIUM 40 MG
40 TABLET ORAL DAILY
Qty: 90 TABLET | Refills: 3 | Status: SHIPPED | OUTPATIENT
Start: 2025-05-05

## 2025-05-05 RX ORDER — MONTELUKAST SODIUM 10 MG/1
10 TABLET ORAL DAILY
Qty: 90 TABLET | Refills: 3 | Status: SHIPPED | OUTPATIENT
Start: 2025-05-05

## 2025-05-07 ENCOUNTER — TELEPHONE (OUTPATIENT)
Age: 80
End: 2025-05-07

## 2025-05-07 NOTE — TELEPHONE ENCOUNTER
Per request from Katja FOWLER called to change time for Injection to 3:30. Will call back after checking.

## 2025-05-08 ENCOUNTER — APPOINTMENT (OUTPATIENT)
Age: 80
End: 2025-05-08
Attending: INTERNAL MEDICINE
Payer: MEDICARE

## 2025-05-09 ENCOUNTER — PATIENT OUTREACH (OUTPATIENT)
Dept: CASE MANAGEMENT | Age: 80
End: 2025-05-09

## 2025-05-09 ENCOUNTER — PATIENT MESSAGE (OUTPATIENT)
Dept: NEUROLOGY | Facility: CLINIC | Age: 80
End: 2025-05-09

## 2025-05-09 DIAGNOSIS — J45.20 MILD INTERMITTENT ASTHMA WITHOUT COMPLICATION (HCC): ICD-10-CM

## 2025-05-09 DIAGNOSIS — M89.9 DISORDER OF BONE AND CARTILAGE: ICD-10-CM

## 2025-05-09 DIAGNOSIS — M94.9 DISORDER OF BONE AND CARTILAGE: ICD-10-CM

## 2025-05-09 DIAGNOSIS — I10 PRIMARY HYPERTENSION: Primary | ICD-10-CM

## 2025-05-09 DIAGNOSIS — G40.309 NONINTRACTABLE GENERALIZED IDIOPATHIC EPILEPSY WITHOUT STATUS EPILEPTICUS (HCC): ICD-10-CM

## 2025-05-09 DIAGNOSIS — F41.1 GAD (GENERALIZED ANXIETY DISORDER): ICD-10-CM

## 2025-05-09 DIAGNOSIS — F32.1 CURRENT MODERATE EPISODE OF MAJOR DEPRESSIVE DISORDER, UNSPECIFIED WHETHER RECURRENT (HCC): ICD-10-CM

## 2025-05-09 NOTE — PROGRESS NOTES
5/9/2025  Spoke to Javier for [facility].      Updates to patient care team/ comments: UTD  Patient reported changes in medications: None  Med Adherence  Comment: Taking as prescribed    Health Maintenance:  Reviewed with patient.  Health Maintenance   Topic Date Due    Zoster Vaccines (1 of 2) Never done    COVID-19 Vaccine (7 - 2024-25 season) 09/01/2024    Annual Physical  10/07/2025    Influenza Vaccine  Completed    DEXA Scan  Completed    Annual Depression Screening  Completed    Fall Risk Screening (Annual)  Completed    Pneumococcal Vaccine: 50+ Years  Completed    Meningococcal B Vaccine  Aged Out    Colorectal Cancer Screening  Discontinued    Mammogram  Discontinued     Patient current concerns: Spoke with the patient, who voiced no new health concerns. She recently followed up with Neurology, during which symptoms of bilateral foot numbness were discussed. Patient is unsure whether this may be a side effect of chemotherapy. A blood test was ordered and sent to an outside laboratory on 04/23/25; she and her daughter are unsure why the results have not yet been completed. Patient was also referred for home health services and has an upcoming in-home evaluation for physical therapy, occupational therapy, and speech therapy.     Patient states that her weight has been stable. She continues to incorporate Ensure drinks to help increase her calorie intake but reports having a poor appetite.  Wt Readings from Last 6 Encounters:   04/07/25 97 lb   04/07/25 97 lb 12.8 oz   03/07/25 98 lb 3.2 oz   03/05/25 98 lb   02/18/25 98 lb 6.4 oz   02/04/25 102 lb 6.4 oz        Patient reports being compliant with her medication regimen, states that her daughter manages and sorts her medications.   Patient denies recent falls, she does have gait issues which she uses a walker to ambulate. Patient lives with her  in a condo, she has assistance from her children with appointments, medications, and grocery shopping.    Goals/Action Plan:     Active goal from previous outreach: work on in-home stretches     Patient reported progress towards goals: no progress               - What: work on in-home stretches           - Where/When/How: work on daily stretches  Patient Reported Barriers and Concerns: no barrriers                   - Plan for overcoming barriers: N/A     Care managers interventions: Reviewed medication list, care team, and health maintenance.   Informed patient of results time frame for blood test.   Autoimmune Neurology / Comprehensive Paraneoplastic Panel (often sent to reference labs like the [facility]) typically takes:  10 to 21 days for final results, depending on the specific antibodies being tested and the lab processing them.    Listened to patient's concerns, provided support, and encouraged her to meet goals. Informed of the importance on reporting any new symptoms to prevent any health complications.   Appointments reviewed with patient.   Future Appointments   Date Time Provider Department Center   5/20/2025  2:15 PM [facility] MRI RM1 (1.5T WIDE) [facility] MRI [facility] [place]   5/30/2025  8:00 AM [facility] CT RM3 [facility] CT [facility] [place]   5/30/2025  9:45 AM [facility] NM RM7 DOSE RM [facility] NM [facility] [place]   5/30/2025 12:45 PM [facility] NM RM4 [facility] NM [facility] [place]   6/20/2025 10:30 AM Dominic Manzo MD [facility] Care Manager Follow Up Date: One month     Reason For Follow Up: review progress and or barriers towards patient's goals.     Time Spent This Encounter Total: 22 min medical record review, telephone communication, care plan updates where needed, education, goals, and action plan recreation/update. Provided acknowledgment and validation to patient's concerns.   Monthly Minute Total including today: 22 min  Physical assessment, complete health history, and need for CCM established by Da Rocha DO.

## 2025-05-12 ENCOUNTER — TELEPHONE (OUTPATIENT)
Dept: NEUROLOGY | Facility: CLINIC | Age: 80
End: 2025-05-12

## 2025-05-12 ENCOUNTER — MED REC SCAN ONLY (OUTPATIENT)
Dept: NEUROLOGY | Facility: CLINIC | Age: 80
End: 2025-05-12

## 2025-05-12 RX ORDER — MONTELUKAST SODIUM 10 MG/1
10 TABLET ORAL DAILY
Qty: 90 TABLET | Refills: 2 | Status: SHIPPED | OUTPATIENT
Start: 2025-05-12

## 2025-05-12 NOTE — TELEPHONE ENCOUNTER
Received fax from [facility] for order number [id number], and [id number]. Placed in provider bin for review and signature

## 2025-05-12 NOTE — TELEPHONE ENCOUNTER
Disp Refills Start End    montelukast 10 MG Oral Tab 90 tablet 3 5/5/2025 --    Sig - Route: Take 1 tablet (10 mg total) by mouth daily. - Oral    Sent to pharmacy as: Montelukast Sodium 10 MG Oral Tablet (Singulair)    E-Prescribing Status: Receipt confirmed by pharmacy (5/5/2025  7:48 PM CDT)      Pharmacy    [id number] - [place], [place] - [address] AT [facility], [phone], [phone]     montelukast 10 MG Oral Tab 90 tablet 2 5/12/2025 --    Sig - Route: Take 1 tablet (10 mg total) by mouth daily. - Oral    Sent to pharmacy as: Montelukast Sodium 10 MG Oral Tablet (Singulair)    E-Prescribing Status: Transmission to pharmacy in progress (5/12/2025  8:41 AM CDT)      Pharmacy    [id number] - [place], [place] - [address] AT [facility], [phone], [phone]

## 2025-05-15 LAB
AGNA-1: NEGATIVE
AMPA-R1 ANTIBODY: NEGATIVE
AMPA-R2 ANTIBODY: NEGATIVE
AMPHIPHYSIN ANTIBODY: NEGATIVE
ANTI-HU AB: NEGATIVE
ANTI-RI AB: NEGATIVE
ANTI-YO AB: NEGATIVE
ANTINERUONAL NUCLEAR AB TYPE 3: NEGATIVE
AQUAPORIN 4 ANTIBODY: NEGATIVE
CASPR2 ANTIBODY,CELL-BASED IFA: NEGATIVE
CRMP-5 IGG: NEGATIVE
DNER ANTIBODY: NEGATIVE
DPPX ANTIBODY: NEGATIVE
GABA-B-R ANTIBODY: NEGATIVE
GAD65 ANTIBODY: NEGATIVE
IGLON5 ANTIBODY: NEGATIVE
INTERPRETATION: NEGATIVE
ITPR1 ANTIBODY: NEGATIVE
LGI1 ANTIBODY, CELL-BASED IFA: NEGATIVE
MA2/TA ANTIBODY: NEGATIVE
MGLUR1 ANTIBODY: NEGATIVE
NMDA-R ANTIBODY: NEGATIVE
PURKINJE CELL CYTO AB TYPE 2: NEGATIVE
PURKINJE CELL CYTO AB TYPE TR: NEGATIVE
VGCC ANTIBODY: <1 PMOL/L
ZIC4 ANTIBODY: NEGATIVE

## 2025-05-20 ENCOUNTER — HOSPITAL ENCOUNTER (OUTPATIENT)
Dept: MRI IMAGING | Age: 80
Discharge: HOME | End: 2025-05-20
Attending: Other
Payer: MEDICARE

## 2025-05-20 DIAGNOSIS — R41.89 PSEUDODEMENTIA: ICD-10-CM

## 2025-05-20 DIAGNOSIS — G31.84 MILD COGNITIVE IMPAIRMENT WITH MEMORY LOSS: ICD-10-CM

## 2025-05-20 PROCEDURE — A9575 INJ GADOTERATE MEGLUMI 0.1ML: HCPCS | Performed by: OTHER

## 2025-05-20 PROCEDURE — 70553 MRI BRAIN STEM W/O & W/DYE: CPT | Performed by: OTHER

## 2025-05-20 RX ORDER — DIPHENHYDRAMINE HYDROCHLORIDE 50 MG/ML
10 INJECTION, SOLUTION INTRAMUSCULAR; INTRAVENOUS
Status: COMPLETED | OUTPATIENT
Start: 2025-05-20 | End: 2025-05-20

## 2025-05-20 RX ADMIN — DIPHENHYDRAMINE HYDROCHLORIDE 10 ML: 50 INJECTION, SOLUTION INTRAMUSCULAR; INTRAVENOUS at 14:25:00

## 2025-05-28 ENCOUNTER — NURSE ONLY (OUTPATIENT)
Age: 80
End: 2025-05-28
Attending: INTERNAL MEDICINE
Payer: MEDICARE

## 2025-05-28 DIAGNOSIS — C79.51 METASTASIS TO BONE (HCC): Primary | ICD-10-CM

## 2025-05-28 LAB
ALBUMIN SERPL-MCNC: 4.6 G/DL (ref 3.2–4.8)
CALCIUM BLD-MCNC: 9.4 MG/DL (ref 8.7–10.4)
CREAT BLD-MCNC: 0.63 MG/DL (ref 0.55–1.02)
EGFRCR SERPLBLD CKD-EPI 2021: 91 ML/MIN/1.73M2 (ref 60–?)
MAGNESIUM SERPL-MCNC: 1.5 MG/DL (ref 1.6–2.6)
PHOSPHATE SERPL-MCNC: 3.2 MG/DL (ref 2.4–5.1)

## 2025-05-28 NOTE — PROGRESS NOTES
[facility] and Prolia injection     Labs drawn via LAC on 3rd attempt (success by JACINTA Erwin)     Creatinine, calcium, mag, and phos reviewed. Magnesium level slightly low at 1.5; notified Jessica VIERA who gave OK to proceed. Calcium level of 9.4  Pt denies any recent or planned dental work  Prolia administered SQ to R upper arm     Pt appeared to tolerate injection without difficulty or complaint  Discharged in stable condition ambulating with walker, accompanied by daughter

## 2025-05-30 ENCOUNTER — HOSPITAL ENCOUNTER (OUTPATIENT)
Dept: NUCLEAR MEDICINE | Facility: HOSPITAL | Age: 80
Discharge: HOME | End: 2025-05-30
Attending: INTERNAL MEDICINE
Payer: MEDICARE

## 2025-05-30 ENCOUNTER — HOSPITAL ENCOUNTER (OUTPATIENT)
Dept: CT IMAGING | Facility: HOSPITAL | Age: 80
Discharge: HOME | End: 2025-05-30
Attending: RADIOLOGY
Payer: MEDICARE

## 2025-05-30 DIAGNOSIS — Z17.0 MALIGNANT NEOPLASM OF LEFT BREAST IN FEMALE, ESTROGEN RECEPTOR POSITIVE, UNSPECIFIED SITE OF BREAST (HCC): ICD-10-CM

## 2025-05-30 DIAGNOSIS — C79.51 METASTASIS TO BONE (HCC): ICD-10-CM

## 2025-05-30 DIAGNOSIS — C79.51 SECONDARY CANCER OF BONE (HCC): ICD-10-CM

## 2025-05-30 DIAGNOSIS — C50.912 MALIGNANT NEOPLASM OF LEFT BREAST IN FEMALE, ESTROGEN RECEPTOR POSITIVE, UNSPECIFIED SITE OF BREAST (HCC): ICD-10-CM

## 2025-05-30 PROCEDURE — 78306 BONE IMAGING WHOLE BODY: CPT | Performed by: INTERNAL MEDICINE

## 2025-05-30 PROCEDURE — 71260 CT THORAX DX C+: CPT | Performed by: RADIOLOGY

## 2025-06-05 ENCOUNTER — TELEPHONE (OUTPATIENT)
Dept: NEUROLOGY | Facility: CLINIC | Age: 80
End: 2025-06-05

## 2025-06-05 ENCOUNTER — MED REC SCAN ONLY (OUTPATIENT)
Dept: NEUROLOGY | Facility: CLINIC | Age: 80
End: 2025-06-05

## 2025-06-05 NOTE — TELEPHONE ENCOUNTER
Daniella from [facility] called looking for a verbal order from RN for a visit with  them. Per Daniella there was a missed apt last week and they just need a verbal that its ok for the order. Please advise. Daniella stated if she doesn't answer the phone to just leave a voicemail if its ok for the verbal.

## 2025-06-05 NOTE — TELEPHONE ENCOUNTER
[facility] order [id number] . Advising moving rn visit from week of 5/26 to 6/6. Placed in provider bin.

## 2025-06-05 NOTE — TELEPHONE ENCOUNTER
Received a call from Daniella from [facility] stating she needs a verbal order to move a pt appt.   Neurology gave a verbal order over the phone stating it is fine to more the appt since there is an active order from Dr. Corona on 4/23/25 for RN, PT and SLP to see the pt.   No other needs voiced.

## 2025-06-16 ENCOUNTER — TELEPHONE (OUTPATIENT)
Dept: NEUROLOGY | Facility: CLINIC | Age: 80
End: 2025-06-16

## 2025-06-16 NOTE — TELEPHONE ENCOUNTER
Spoke to Daniella from [facility]. Pt has been seen by PT and nursing. Daniella said PT is going to discharge her. Pt is scheduled for another visit on 6/17 am. Daniella is requesting if   Nursing can continue to see the pt. Daniella is requesting a verbal order. Informed neurology RN will send a message to Dr. Corona, provider is out of the office. And call back with response. Daniella is asking to be called back and okay to leave a message in confidential voicemail.

## 2025-06-16 NOTE — TELEPHONE ENCOUNTER
Daniella from [facility] called looking to get a verbal order from RN for pt to restart Home health. Please advise

## 2025-06-17 NOTE — TELEPHONE ENCOUNTER
Daniella from [facility] call for verbal to restart skilled nursing; [phone]

## 2025-06-17 NOTE — TELEPHONE ENCOUNTER
Informed Daniella ([facility]) of Dr. Corona's message:     Caryn Corona, DO to Me   6/17/25  9:18 AM  \"Yes it is ok\"    Order will be faxed to neurology office for provider's signature per Daniella. No other needs at this time.

## 2025-06-18 NOTE — TELEPHONE ENCOUNTER
[facility] order [id number]. Signed by provider, faxed back and fwded to medical records for scanning. '

## 2025-06-19 NOTE — PROGRESS NOTES
Nursing Follow-Up Note    Patient: Javier Aaron  YOB: 1945  Age: 79 year old  Radiation Oncologist: Dr. Davion Wallace  Referring Physician: Davion Wallace  Chief Complaint:   Chief Complaint   Patient presents with    Follow - Up     RT      Date: 6/19/2025    Toxicities:     Vital Signs: There were no vitals taken for this visit.,   Wt Readings from Last 6 Encounters:   06/20/25 43.2 kg (95 lb 3.2 oz)   04/07/25 44 kg (97 lb)   04/07/25 44.4 kg (97 lb 12.8 oz)   03/07/25 44.5 kg (98 lb 3.2 oz)   03/05/25 44.5 kg (98 lb)   02/18/25 44.6 kg (98 lb 6.4 oz)       Allergies:  Allergies[1]         The following individual(s) verbally consented to be recorded using ambient AI listening technology and understand that they can each withdraw their consent to this listening technology at any point by asking the clinician to turn off or pause the recording:    Patient name: Javier Aaron  Additional names: Paul and Niecy    Nursing Note: Completed SBRT to R 2nd rib on 3/7/25. Here for 3 month f/u. Accompanied by son and daughter. VSS, taken by Med Onc. Bone scan and CT chest done 5/30. Energy level fair. Denies bone pain. Appetite low-normal, denies n/v.          [1]   Allergies  Allergen Reactions    Prednisone ANXIETY and OTHER (SEE COMMENTS)     Jittery    Clindamycin      Other reaction(s): CLINDAMYCIN    Penicillins UNKNOWN

## 2025-06-20 ENCOUNTER — OFFICE VISIT (OUTPATIENT)
Dept: RADIATION ONCOLOGY | Facility: HOSPITAL | Age: 80
End: 2025-06-20
Attending: RADIOLOGY
Payer: MEDICARE

## 2025-06-20 ENCOUNTER — OFFICE VISIT (OUTPATIENT)
Age: 80
End: 2025-06-20
Attending: INTERNAL MEDICINE
Payer: MEDICARE

## 2025-06-20 VITALS
RESPIRATION RATE: 18 BRPM | BODY MASS INDEX: 17.97 KG/M2 | DIASTOLIC BLOOD PRESSURE: 82 MMHG | HEIGHT: 61 IN | WEIGHT: 95.19 LBS | SYSTOLIC BLOOD PRESSURE: 131 MMHG | HEART RATE: 98 BPM | OXYGEN SATURATION: 98 % | TEMPERATURE: 99 F

## 2025-06-20 DIAGNOSIS — Z51.81 ENCOUNTER FOR MONITORING AROMATASE INHIBITOR THERAPY: ICD-10-CM

## 2025-06-20 DIAGNOSIS — C79.51 METASTASIS TO BONE (HCC): ICD-10-CM

## 2025-06-20 DIAGNOSIS — C79.51 SECONDARY CANCER OF BONE (HCC): Primary | ICD-10-CM

## 2025-06-20 DIAGNOSIS — Z17.0 MALIGNANT NEOPLASM OF LEFT BREAST IN FEMALE, ESTROGEN RECEPTOR POSITIVE, UNSPECIFIED SITE OF BREAST (HCC): Primary | ICD-10-CM

## 2025-06-20 DIAGNOSIS — C50.912 MALIGNANT NEOPLASM OF LEFT BREAST IN FEMALE, ESTROGEN RECEPTOR POSITIVE, UNSPECIFIED SITE OF BREAST (HCC): Primary | ICD-10-CM

## 2025-06-20 DIAGNOSIS — Z79.811 ENCOUNTER FOR MONITORING AROMATASE INHIBITOR THERAPY: ICD-10-CM

## 2025-06-20 DIAGNOSIS — C79.51 SECONDARY CANCER OF BONE (HCC): ICD-10-CM

## 2025-06-20 PROCEDURE — 99211 OFF/OP EST MAY X REQ PHY/QHP: CPT

## 2025-06-20 NOTE — PROGRESS NOTES
HPI   Javier Aaron is a 79 year old female here for follow up of   Encounter Diagnoses   Name Primary?    Malignant neoplasm of left breast in female, estrogen receptor positive, unspecified site of breast (HCC) Yes    Metastasis to bone (HCC)     Encounter for monitoring aromatase inhibitor therapy     Secondary cancer of bone (HCC)      Patient completed palliative radiation to the single site of progression on the right second rib, treatment from 3/3/2025 through 3/7/2025.    She is started exemestane.  Has been compliant.  No hot flashes.  No arthralgias.     No pain in her rib.      Stress at home from  being sick.    No hot flashes and no arthralgias.     ECOG performance status  1      Review of Systems:   Review of Systems   Constitutional:  Positive for appetite change (Due to stress). Negative for fatigue and unexpected weight change.   Respiratory:  Negative for cough and shortness of breath.    Cardiovascular:  Negative for chest pain.   Gastrointestinal:  Negative for abdominal pain.   Endocrine: Negative for hot flashes.   Genitourinary:  Negative for dysuria and frequency.    Musculoskeletal:  Positive for gait problem (uses walker). Negative for arthralgias, back pain and neck pain.   Skin:  Negative for rash.   Neurological:  Positive for gait problem (uses walker). Negative for dizziness and headaches.   Hematological:  Negative for adenopathy.   Psychiatric/Behavioral:  Negative for sleep disturbance.          Current Outpatient Medications   Medication Sig Dispense Refill    montelukast 10 MG Oral Tab Take 1 tablet (10 mg total) by mouth daily. 90 tablet 2    PRAVASTATIN 40 MG Oral Tab TAKE 1 TABLET(40 MG) BY MOUTH DAILY 90 tablet 3    memantine 10 MG Oral Tab Take 1 tablet (10 mg total) by mouth daily. 90 tablet 3    exemestane 25 MG Oral Tab Take 1 tablet (25 mg total) by mouth daily. 90 tablet 3    Calcium 600 MG Oral Tab Take 600 mg by mouth 2 (two) times daily. 180 tablet 3     mirtazapine 30 MG Oral Tab       levocetirizine 5 MG Oral Tab Take 1 tablet (5 mg total) by mouth every evening. 90 tablet 3    zonisamide 100 MG Oral Cap Take 1 capsule (100 mg total) by mouth in the morning and 1 capsule (100 mg total) before bedtime. 180 capsule 3    Trospium Chloride 20 MG Oral Tab Take 1 tablet (20 mg total) by mouth nightly.      Glucosamine-Chondroit-Vit C-Mn (GLUCOSAMINE 1500 COMPLEX OR) Take 2 capsules by mouth in the morning.      Multiple Vitamins-Minerals (MULTI FOR HER 50+) Oral Tab Take by mouth.       Allergies:   Allergies   Allergen Reactions    Prednisone ANXIETY and OTHER (SEE COMMENTS)     Jittery    Clindamycin      Other reaction(s): CLINDAMYCIN    Penicillins UNKNOWN       Past Medical History:    Asthma (HCC)    asthma as a child    Breast CA (HCC)    Breast cancer, left (HCC)    PER nh: LUMPECTOMY 04/2006'; LYMPH NODE LEFT; LT LYMPH NODE DISSECTION, 6 out of 10    Cancer (HCC)    Cataract    Encounter for chemotherapy management    per NG: chemo for breast cancer    Extrinsic asthma, unspecified    Hearing impairment    bilateral hearing aids    Hemorrhoids    History of Papanicolaou smear of cervix    DONE IN NG    Radiation    per NG: Lt breast radiation /Dr Champagne    Seizure disorder (HCC)    1 time event- taking meds for prevention     Past Surgical History:   Procedure Laterality Date    Breast lumpectomy  04/2006    PER NG: BREAST CANCER LEFT SIDE SEES ONCOLOGY AT [facility] FOR ANNUAL MAMMO    Breast surgery procedure unlisted  04/2006    per NG: Lt lymph node disection. 6 out of 10 affected YEARLY MAMMOGRAMS AT [facility]    Breast surgery procedure unlisted  2011    per NG:right breast calcification removal    Cataract Right 2022    Chemotherapy      Colonoscopy  2010    Hip replacement surgery      AND REVISION 2016    Lumpectomy left      Radiation left      Surgery - external  12/2017    LEFT FEMUR CANCER HARDWARE/MARLI PLACED      Social History     Socioeconomic History     Marital status:    Tobacco Use    Smoking status: Former     Types: Cigarettes    Smokeless tobacco: Never    Tobacco comments:     Quit when she as 24   Vaping Use    Vaping status: Never Used   Substance and Sexual Activity    Alcohol use: No    Drug use: Never   Other Topics Concern    Caffeine Concern Yes     Comment: per NG: chocolate; tea, 2 cups daily    Reaction to local anesthetic No    Pt has a pacemaker No    Pt has a defibrillator No     Social Drivers of Health     Food Insecurity: No Food Insecurity (1/2/2025)    Food Insecurity     Food Insecurity: Never true   Transportation Needs: No Transportation Needs (1/2/2025)    Transportation Needs     Lack of Transportation: No   Housing Stability: Low Risk  (1/2/2025)    Housing Stability     Housing Instability: No         Family History   Problem Relation Age of Onset    Breast Cancer Self 69    Diabetes Mother     Endocrine Disorder Mother 62        per NG: pancreatitis    Hypertension Mother     Polyps Daughter         per NG: colon polyps    Diabetes Maternal Aunt         per NG: Type 2    Diabetes Maternal Uncle         per NG: Type 2    Breast Cancer Cousin     Glaucoma Neg          PHYSICAL EXAM:    /82 (BP Location: Right arm, Patient Position: Sitting, Cuff Size: adult)   Pulse 98   Temp 98.7 °F (37.1 °C) (Tympanic)   Resp 18   Ht 1.549 m (5' 1\")   Wt 43.2 kg (95 lb 3.2 oz)   SpO2 98%   BMI 17.99 kg/m²   Wt Readings from Last 6 Encounters:   06/20/25 43.2 kg (95 lb 3.2 oz)   04/07/25 44 kg (97 lb)   04/07/25 44.4 kg (97 lb 12.8 oz)   03/07/25 44.5 kg (98 lb 3.2 oz)   03/05/25 44.5 kg (98 lb)   02/18/25 44.6 kg (98 lb 6.4 oz)     Physical Exam  General: Patient is alert, not in acute distress.  HEENT: EOMs intact. PERRL.   Neck: No JVD. No palpable lymphadenopathy. Neck is supple.  Chest: Clear to auscultation.  Heart: Regular rate and rhythm.  + RANDELL  Abdomen: Soft, non tender with good bowel sounds.  Extremities: No  edema.  Neurological: Grossly intact.   Lymphatics: There is no palpable lymphadenopathy throughout in the cervical, supraclavicular, axillary, or inguinal regions.  Psych/Depression: nl        ASSESSMENT/PLAN:     1. Malignant neoplasm of left breast in female, estrogen receptor positive, unspecified site of breast (HCC)    2. Metastasis to bone (HCC)    3. Encounter for monitoring aromatase inhibitor therapy    4. Secondary cancer of bone (HCC)        H/o breast cancer with left lumpectomy in year 2006 treated with radiation therapy.  Per record this was ER/MI positive, and she was treated in the past with letrozole.  She had a benign surgical biopsy of the retroareolar right breast for an intraductal papilloma.      On 2/19/2024 BI-RADS 2, she will be due for follow-up imaging in February 2025.      Patient had been worked up by primary care doctor for weight loss.  Her most recent weights have been stable.  She had a CT scan of the chest, abdomen and pelvis for evaluation for weight loss which was on 1/23/2025.  This showed the right second rib with a fusiform expansile lesion concerning for bone metastases.  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      She has prior h/o chondrosarcoma grade I of the LE.    Lesion on bone scan was consistent with osseous metastatic disease.  Given that she had ER positive breast cancer in the past, discussed proceeding with a PET Cerianna to determine if this is consistent with breast cancer that is ER positive.  If negative, would then proceed with the biopsy in order to have tissue for next generation sequencing and for treatment options.    Given that this is the only site and if this is the case with the PET Cerianna, being oligometastatic disease, would recommend radiation oncology evaluation to treat the site, and would then recommend resuming treatment with an aromatase inhibitor, likely exemestane.  Would recommend  single agent therapy, given that tumor burden will be low after treating the site with radiation, and would be better tolerated with less side effects and adding a CDK 4/6 for such a low tumor burden.  Will reserve that for further progression in the future.    In the future, may have NGS testing.  If confirm MBC with above testing, recommend genetic testing for evaluation of a BRCA mutation for treatment options.     Patient completed palliative radiation to the second rib metastatic lesion.  She is on exemestane and has been taking it daily.  Will proceed with CT scan of the chest, which was ordered by Dr. Vinnie Wallace, and bone scan in June 2025 to assess response to therapy.  Order will be given today, patient to follow-up in June with above imaging.    Patient continues on exemestane and denosumab.  She had imaging to assess disease on 5/30/2025 which shows the expansile right second rib lesion compatible with solid metastatic lesion and both the CT and bone scan are stable.    Patient to continue with exemestane and denosumab which is being dosed for osteopenia/osteoporosis.  Will proceed with repeat imaging in 6 months which will be end of November or early December 2025.  Follow-up as needed in between visits.    BMI 17.99:  recommend to increase weight and drink more shakes.    Osteopenia: DEXA on 3/14/2025 consistent with osteopenia with increased risk of fractures.  She is on Prolia every 6 months and repeat DEXA in March 2027.  Patient then completed dental evaluation.  Patient to take vitamin D supplementation 2000 IU daily and a daily calcium supplement..    Given that she has metastatic disease, no further mammograms recommended.      Return in about 6 months (around 12/20/2025) for medication monitoring, with imaging.    MDM moderate risk  Continuity of complex medical care  No orders of the defined types were placed in this encounter.      Results From Past 48 Hours:  No results found for this or any  previous visit (from the past 48 hours).        Imaging & Referrals:  NM BONE SCAN WB (CPT=78306)  CT CHEST(CONTRAST ONLY) (CPT=71260)   No orders of the defined types were placed in this encounter.    Narrative   PROCEDURE: NM BONE SCAN WB (CPT=78306)      COMPARISON: [facility], PET BREAST ER RECEPTOR FLUOROESTRADIOL (CPT=78815), 2/06/2025, 1:29 PM.  [facility], CT CHEST(CONTRAST ONLY) (CPT=71260), 5/30/2025, 7:57 AM.  [facility], NM BONE SCAN WB   (RAP=[id number]), 1/30/2025, 10:08 AM.      INDICATIONS: Left breast cancer, estrogen receptor positive,  with osseous metastatic disease (C79.51, C50.912, Z17.0).      TECHNIQUE: A planar bone scan was done following the injection of 26.2 millicuries of Tc-99m MDP into a left antecubital vein.    Automated whole body images and spots of the pelvis and thorax were obtained.       FINDINGS:   SKULL: No pathologic radiotracer activity is seen. Relative photopenia in the region of the oral cavity may reflect dental hardware/amalgam. There is mild low-grade activity along the maxillary alveolar ridge.   SPINE: Angulated dextroscoliosis of the lumbar spine is apparent; there is compensatory dextroscoliosis of the thoracic spine.   THORAX: Heterogeneous expansile infiltrative activity is demonstrated throughout the lateral and anterolateral right 2nd rib.   PELVIS: No pathologic radiotracer activity is seen.   EXTREMITIES: There is photopenia in the region of the left hip corresponding to a long-stem left hip prosthesis.   Bilaterally symmetric uptake is present at the shoulder and left greater than right sternoclavicular joints.   GENITOURINARY: Physiologic excretion is apparent.   OTHER: Negative for delayed soft tissue uptake.                    Impression   CONCLUSION:   1. Stable examination demonstrating expansile osseous metastatic involvement of the right 2nd rib.      2. No additional scintigraphic evidence of  osseous metastatic disease.       3. Left hip prosthesis.      4. Lesser incidental findings as above.            Dictated by (CST): Bruce Lucia MD on 6/03/2025 at 6:09 AM       Finalized by (CST): Bruce Lucia MD on 6/03/2025 at 6:12 AM         PROCEDURE: CT CHEST(CONTRAST ONLY) (CPT=71260)      COMPARISON: [facility], PET BREAST ER RECEPTOR FLUOROESTRADIOL (CPT=78815), 2/06/2025, 1:29 PM.  [facility], [place], [place] BONE SCAN WB (ALN=[id number]), 5/30/2025, 11:04 AM.  [facility], CT   CHEST+ABDOMEN+PELVIS(ALL CNTRST ONLY)(CPT=71260/37851), 1/23/2025, 5:28 PM.      INDICATIONS: Secondary cancer of bone (HCC).  Left breast cancer.      TECHNIQUE: CT images of the chest were obtained with non-ionic intravenous contrast material.  Automated exposure control for dose reduction was used. Adjustment of the mA and/or kV was done based on the patient's size. Use of iterative reconstruction   technique for dose reduction was used. Dose information is transmitted to the ACR (American College of Radiology) NRDR (National Radiology Data Registry) which includes the Dose Index Registry.      FINDINGS:   CARDIAC: Mild to moderate aortic and coronary artery calcification.  No aortic aneurysm or overt cardiomegaly.   MEDIASTINUM/CHRIS: No mass or enlarged adenopathy.    LUNGS: Mild scattered scarring and bronchiectasis.  No suspicious pulmonary nodules.  No pleural effusions.  Mild anterior left lung scarring is compatible with prior left breast radiation therapy.   CHEST WALL: Status post left mastectomy and axillary lymph node dissection.  No chest wall lymphadenopathy.   LIMITED ABDOMEN: Cholelithiasis.  No acute or suspicious abnormality.   BONES: Expansile right anterior 2nd rib, compatible with metastasis, without associated fracture.  Stable non-specific lytic appearing focal change in the posterior left 9th rib, which previously did not demonstrate radiotracer  uptake on PET or bone   scan.  No additional suspicious lesion identified.  Mild to moderate thoracic levoscoliosis.               Impression  CONCLUSION:      Expansile right 2nd rib lesion, compatible with solitary thoracic metastasis.            Dictated by (CST): Ashkan Hadley MD on 6/02/2025 at 1:32 PM       Finalized by (CST): Ashkan Hadley MD on 6/02/2025 at 1:44 PM

## 2025-06-20 NOTE — PATIENT INSTRUCTIONS
Patient Disclaimer:  [facility] has recently implemented an AI-assisted system to help record and organize medical information, including the after-visit summary you receive.     If anything in this summary appears inaccurate, confusing, or incomplete, we encourage you to reach out to your doctor or nurse to clarify or correct the information. Your safety and understanding are our top priorities.     VISIT SUMMARY:  You came in today for a follow-up visit after completing radiation therapy for your rib metastasis. You reported feeling well with no pain or breathing difficulties, and your recent bone scan showed stability in your condition. You are currently on Exemestane and hormone therapy to manage your condition.    YOUR PLAN:  -OLIGOMETASTATIC BREAST CANCER: Oligometastatic breast cancer means that the cancer has spread to a limited number of sites outside the breast. You completed radiation therapy on March 7, 2025, which successfully controlled the disease in your right second rib. Your recent bone scan showed no new tumor growth, and you are not experiencing any pain or complications. Continue taking Exemestane as prescribed by Dr. Scales to control the disease elsewhere in your body. If you notice any new symptoms or have concerns, contact radiation oncology. Additional radiation therapy may be considered if new metastatic sites develop that do not respond to your current treatment.    INSTRUCTIONS:  Please continue taking Exemestane as prescribed and follow up with Dr. Manzo for ongoing management. If you notice any new symptoms or have concerns, contact radiation oncology. Your next follow-up appointment will be scheduled as advised by Dr. Manzo.    Follow up with Dr. Vinnie Wallace as needed.    Please call [phone] with any radiation questions.     Contains text generated by Ara

## 2025-06-21 NOTE — PROGRESS NOTES
[facility]    PATIENT'S NAME: MIRZA MENDOSA   RADIATION ONCOLOGIST: Davion Wallace MD   PATIENT ACCOUNT #: [id number] LOCATION: [facility]   MEDICAL RECORD #: [id number] YOB: 1945   FOLLOW-UP DATE: 06/20/2025       RADIATION ONCOLOGY FOLLOW-UP NOTE    REFERRING PHYSICIAN:  Dominic Manzo MD    DIAGNOSIS:  Oligometastatic breast cancer.    REGION TREATED:  Right second rib, 2700 cGy, completed 03/07/2025.    INTERVAL SINCE COMPLETION OF RADIATION THERAPY:  Three months.    PATIENT STATUS:  Excellent recovery from therapy with radiographic control.    HISTORY:  The patient is a 79-year-old female who presents today for a routine followup visit.  She has a history of breast cancer dating back about 20 years.  At that time, she was treated with lumpectomy followed by radiation for left-sided breast cancer.  Her tumor was ER/SD positive and she underwent 5 years of letrozole.  She did well for a long time but began having some weight loss and a workup included a CT scan of the chest, abdomen, and pelvis on 01/23/2025.  This showed an expansile sclerotic and lytic lesion involving the anterolateral right second rib.  A biopsy showed this to represent infiltrative osseous metastatic disease with no other disease elsewhere.  She underwent a PET Cerianna on 02/06/2025, which showed the known right second rib metastasis with the possibility of a smaller lesion in the right parietal bone with no clear CT correlate.  Given the oligoprogressive nature of her disease, Dr. Manzo started the patient on exemestane, but referred her to Radiation Oncology to consider treatment to the site of oligoprogression.  I then treated the right second rib with stereotactic body radiotherapy to 2700 cGy in 3 fractions completing on 03/07/2025.    On her visit today, the patient is doing very well.  She denies any particular issues or complaints.  She never had any side effects from the radiation.  She may have had  some fatigue for a week or so afterwards, but this recovered quickly.  She has no pain or difficulty in the chest wall and has had no respiratory issues whatsoever since completing treatment.  Overall, she feels that she really never had any side effects to speak of.    She did recently have imaging including a CT scan of the chest and a bone scan.  These were done on 05/30/2025 and essentially shows stability with no evidence of any progression of her right second rib metastasis.    PHYSICAL EXAMINATION:  VITAL SIGNS:  On exam today, the patient is noted to have a blood pressure of 131/82, pulse 98, respiratory rate of 18, and temperature of 98.7.  She has a pain score of 0, and a weight of 95 pounds.  NECK:  Supple with no lymphadenopathy.  LUNGS:  Clear to auscultation bilaterally.  HEART:  Regular rate and rhythm.  Normal S1 and S2, and no audible murmurs.  LYMPHATICS:  There is no supraclavicular, axillary, inguinal lymphadenopathy.  ABDOMEN:  Benign.    IMPRESSION AND RECOMMENDATIONS:  Overall, the patient is doing very well at this time.  She recovered nicely from any acute side effects from her SBRT to the right second rib.  This appears to have been successful on short-term imaging.  She continues on exemestane under the care of Dr. Manzo.    I am pleased with her response and the lack of any side effects.  The patient will continue to follow with Dr. Manzo.  I told her that she really does not need to come back and see me for routine visits, but I would be happy to see her again in the future if a need should arise.  In that instance, she should contact my office and I would be happy to see her.  Alternatively, she could talk to Dr. Manzo, who can refer her if she deems it appropriate.    Thank you very much for allowing me the opportunity to participate in the care of this patient.  If there should be any questions regarding the radiotherapy, please feel free to contact me any time.    Dictated By Davion Birmingham  MD [place]  d: 06/20/2025 14:05:13  t: 06/20/2025 15:13:19  [facility] [id number]  NAD/    cc: MD Da Singh,

## 2025-07-01 ENCOUNTER — TELEPHONE (OUTPATIENT)
Dept: NEUROLOGY | Facility: CLINIC | Age: 80
End: 2025-07-01

## 2025-07-01 ENCOUNTER — MED REC SCAN ONLY (OUTPATIENT)
Dept: NEUROLOGY | Facility: CLINIC | Age: 80
End: 2025-07-01

## 2025-07-08 ENCOUNTER — TELEPHONE (OUTPATIENT)
Dept: NEUROLOGY | Facility: CLINIC | Age: 80
End: 2025-07-08

## 2025-07-08 NOTE — TELEPHONE ENCOUNTER
Called pt to inform appt today with Dr. Corona needs to be cancelled. Rn offered to reschedule. Pt was agreeable to reschedule appt on 7/9/25 at 4:00pm to [place] office. Pt will inform her daughter who was to bring her to appt. Pt informed if her ride cannot bring them to new appt, pt will call  to reschedule.   Pt's spouse also has an appt today that needs to be cancelled. Spouse appt also rescheduled on 7/9/25 at 4:00pm in [place] office.

## 2025-07-09 ENCOUNTER — LAB ENCOUNTER (OUTPATIENT)
Dept: LAB | Facility: HOSPITAL | Age: 80
End: 2025-07-09
Attending: Other
Payer: MEDICARE

## 2025-07-09 DIAGNOSIS — G31.84 MILD COGNITIVE IMPAIRMENT WITH MEMORY LOSS: ICD-10-CM

## 2025-07-09 PROCEDURE — 36415 COLL VENOUS BLD VENIPUNCTURE: CPT

## 2025-07-09 PROCEDURE — 83520 IMMUNOASSAY QUANT NOS NONAB: CPT

## 2025-07-09 NOTE — TELEPHONE ENCOUNTER
[facility] order [id number] . Signed by provider, faxed back and fwded to medical records for scanning.

## 2025-07-14 LAB
PHOSPHORYLATED TAU 217 (P-TAU217), PLASMA: 0.37 PG/ML
PHOSPHORYLATED TAU 217 (P-TAU217), PLASMA: 0.37 PG/ML

## 2025-07-15 LAB
PHOSPHORYLATED TAU 181 (P-TAU181), PLASMA: 1.4 PG/ML
PHOSPHORYLATED TAU 181 (P-TAU181), PLASMA: 1.4 PG/ML

## 2025-08-04 ENCOUNTER — PATIENT MESSAGE (OUTPATIENT)
Dept: FAMILY MEDICINE CLINIC | Facility: CLINIC | Age: 80
End: 2025-08-04

## 2025-08-07 RX ORDER — TROSPIUM CHLORIDE 20 MG/1
20 TABLET, FILM COATED ORAL NIGHTLY
Qty: 90 TABLET | Refills: 0 | Status: SHIPPED | OUTPATIENT
Start: 2025-08-07

## 2025-08-11 ENCOUNTER — TELEPHONE (OUTPATIENT)
Dept: FAMILY MEDICINE CLINIC | Facility: CLINIC | Age: 80
End: 2025-08-11

## 2025-08-18 ENCOUNTER — TELEPHONE (OUTPATIENT)
Dept: FAMILY MEDICINE CLINIC | Facility: CLINIC | Age: 80
End: 2025-08-18

## 2025-08-25 ENCOUNTER — APPOINTMENT (OUTPATIENT)
Age: 80
End: 2025-08-25
Attending: INTERNAL MEDICINE
Payer: MEDICARE

## 2025-08-27 ENCOUNTER — MED REC SCAN ONLY (OUTPATIENT)
Dept: NEUROLOGY | Facility: CLINIC | Age: 80
End: 2025-08-27

## (undated) DIAGNOSIS — I10 PRIMARY HYPERTENSION: ICD-10-CM

## (undated) DIAGNOSIS — F41.1 GAD (GENERALIZED ANXIETY DISORDER): ICD-10-CM

## (undated) DIAGNOSIS — F32.1 CURRENT MODERATE EPISODE OF MAJOR DEPRESSIVE DISORDER, UNSPECIFIED WHETHER RECURRENT (HCC): ICD-10-CM

## (undated) DIAGNOSIS — C40.22 MALIGNANT NEOPLASM OF LONG BONE OF LEFT LOWER EXTREMITY (HCC): ICD-10-CM

## (undated) DIAGNOSIS — Z85.3 HISTORY OF BREAST CANCER: ICD-10-CM

## (undated) DIAGNOSIS — J45.20 MILD INTERMITTENT ASTHMA WITHOUT COMPLICATION: Primary | ICD-10-CM

## (undated) DIAGNOSIS — R56.9 SEIZURE (HCC): ICD-10-CM

## (undated) DIAGNOSIS — I10 PRIMARY HYPERTENSION: Primary | ICD-10-CM

## (undated) DIAGNOSIS — J45.20 MILD INTERMITTENT ASTHMA WITHOUT COMPLICATION: ICD-10-CM

## (undated) NOTE — LETTER
Patient Name: Paula Mccarty  YOB: 1945          MRN number:  [id number]  Date:  2/3/2020  Referring Physician: Maame Escalante       LOWER EXTREMITY EVALUATION:   Referring Physician:   Diagnosis: L hip restriction Date of Service: 2/3/20 HEP correctly without reported pain. Skilled Physical Therapy is medically necessary to address the above impairments and reach functional goals.      Precautions:  None  OBJECTIVE:   Palpation: mild tenderness anterior lateral L thigh  Sensation: intact 2. Patient to be able to place rolling walker in back of car and remove safely to improve her ability to be independent in daily activities. 3. Patient to improve Ortega balance score by 5-7 deg to decrease fall risk.    4. Patient to improve TUG score to d

## (undated) NOTE — MR AVS SNAPSHOT
[address]  [address] [facility] [phone]               Thank you for choosing us for your health care visit with Rhys Arcos PT.   We are glad to serve you and happy to provide you with this summa Wilbert Physical Therapy Visit By Therapist with Bryce Jensen PT   [facility] in [address] [place] [address] ([address] [facility],[address])    [address] [facility] [phone]           Please arrive at your scheduled appointment time.   Pierre

## (undated) NOTE — MR AVS SNAPSHOT
[address]  [address] [facility] [phone]               Thank you for choosing us for your health care visit with Shey Arreola PT.   We are glad to serve you and happy to provide you with this summa Bernard Physical Therapy Visit By Therapist with Batool Izaguirre PT   [facility] in [address] [address] ([address])    150 [facility] [phone]           Please arrive at your scheduled appointment time.   Pierre

## (undated) NOTE — MR AVS SNAPSHOT
[address]  [address] [facility] [phone]               Thank you for choosing us for your health care visit with Kathi Rose PT.   We are glad to serve you and happy to provide you with this summa [place] Physical Therapy Visit By Therapist with Zara Sanford, PT   [facility] in [address] [address] ([address] [facility])    150 [facility] [phone]           Please arrive at your scheduled appointment time.   Pierre

## (undated) NOTE — MR AVS SNAPSHOT
[address]  [address] [facility] [phone]               Thank you for choosing us for your health care visit with Denise Limon PT.   We are glad to serve you and happy to provide you with this summa Wilbert Physical Therapy Visit By Therapist with Hayley Crain PT   [facility] in [address] [address] ([address] [facility])    150 [facility] [phone]           Please arrive at your scheduled appointment time.   Pierre

## (undated) NOTE — LETTER
11/27/20        [address] [facility] [address]  [address] [facility]      Dear Jesi Adams,    Our records indicate that you have outstanding lab work and or testing that was ordered for you and has not yet been completed:  Orders Placed This Encounter

## (undated) NOTE — MR AVS SNAPSHOT
[address]  [address] [place] [phone]               Thank you for choosing us for your health care visit with Bessie Lee PT.   We are glad to serve you and happy to provide you with this summary o [place] Physical Therapy Visit By Therapist with Robby Hamilton PT   [facility] Services in [address] [address] ([address] [facility])    150 [facility] [phone]           Please arrive at your scheduled appointment time.   Pierre

## (undated) NOTE — LETTER
Patient Name: Jacky Rosales  YOB: 1945          MRN number:  [id number]  Date:  8/3/2018  Referring Physician: No ref.  provider found     LOWER EXTREMITY EVALUATION:    Referring Physician: Ana Redman  Date of Onset: Dec 2016 Date of Servic 1. Patient independent with HEP  2. Patient to have increase in hip abd by 1/2 to 1 mm grade,.   3. Patient to have improved combined hip flex/knee flex motion to be able to go up/down stairs, in and out of car with less difficulty.                Chris Munguia

## (undated) NOTE — Clinical Note
Follow-up event monitor results when completed, and if the monitor is negative schedule loop recorder implantation ([address]). Let me know when that is scheduled.

## (undated) NOTE — MR AVS SNAPSHOT
[address]  150 [facility] [phone]               Thank you for choosing us for your health care visit with Shey Arreola PT.   We are glad to serve you and happy to provide you with this summa [place] Physical Therapy Visit By Therapist with Alejandra Cantu PT   [facility] Services in [address] ([address])    150 [facility] 5667           Please arrive at your scheduled appointment time.   Pierre

## (undated) NOTE — MR AVS SNAPSHOT
[address]  [address] [place] [phone]               Thank you for choosing us for your health care visit with Dee Mckeon PT.   We are glad to serve you and happy to provide you with this summary o Enter your BrainCells Activation Code exactly as it appears below along with your Zip Code and Date of Birth to complete the sign-up process. If you do not sign up before the expiration date, you must request a new code.     Your unique BrainCells Access Code: [email or web address]

## (undated) NOTE — MR AVS SNAPSHOT
[address]  150 [facility] [phone]               Thank you for choosing us for your health care visit with Cj Aguirre PT.   We are glad to serve you and happy to provide you with this summa [place] Physical Therapy Visit By Therapist with Freddie Magallon PT   [facility] Services in [address] ([address])    [address] [place] [address] [phone]           Please arrive at your scheduled appointment time.   Pierre

## (undated) NOTE — MR AVS SNAPSHOT
[address]  150 [facility] [phone]               Thank you for choosing us for your health care visit with Hayley Crain PT.   We are glad to serve you and happy to provide you with this summa [place] Physical Therapy Visit By Therapist with Lucille Pandey, PT   [facility] Services in [address] [address] ([address])    150 [facility] [phone]           Please arrive at your scheduled appointment time.   Pierre

## (undated) NOTE — MR AVS SNAPSHOT
[address]  150 [facility] [phone]               Thank you for choosing us for your health care visit with Nelson Willis PT.   We are glad to serve you and happy to provide you with this summa [place] Physical Therapy Visit By Therapist with Niyah Layne, PT   [facility] Services in [address] ([address]100 [facility],[address])    150 [facility] 5679           Please arrive at your scheduled appointment time.   Pierre

## (undated) NOTE — MR AVS SNAPSHOT
[address]  150 [facility] [phone]               Thank you for choosing us for your health care visit with Lilia Maharaj PT.   We are glad to serve you and happy to provide you with this summa [place] Physical Therapy Visit By Therapist with Roger Cr PT   [place] [facility] Services in [address] [place] [address] (9100 [facility],[address])    150 [facility] [phone]           Please arrive at your scheduled appointment time.   Pierre

## (undated) NOTE — Clinical Note
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 I will be reaching out to the patient in a monthly basis to help overcome barriers and answer any health related questions.

## (undated) NOTE — MR AVS SNAPSHOT
[address]  150 [facility] [phone]               Thank you for choosing us for your health care visit with Christian Pelayo PT.   We are glad to serve you and happy to provide you with this summa [place] Physical Therapy Visit By Therapist with Robby Hamilton, PT   [facility] Services in [address] ([address])    150 [facility] 5657           Please arrive at your scheduled appointment time.   Pierre

## (undated) NOTE — MR AVS SNAPSHOT
[address]  150 [facility] [phone]               Thank you for choosing us for your health care visit with Denise Limon PT.   We are glad to serve you and happy to provide you with this summa [place] Physical Therapy Visit By Therapist with Shey Arreola PT   [facility] Services in [address] [place] [address] ([address]00 [facility],[address])    150 [facility] [phone]           Please arrive at your scheduled appointment time.   Pierre

## (undated) NOTE — MR AVS SNAPSHOT
[address]  150 [facility] [phone]               Thank you for choosing us for your health care visit with Parth Marks PT.   We are glad to serve you and happy to provide you with this summa [place] Physical Therapy Visit By Therapist with Neema Huynh, PT   [facility] Services in [address] [place] [address] ([address] [facility],[address])    150 [facility] [phone]           Please arrive at your scheduled appointment time.   Pierre

## (undated) NOTE — LETTER
10/1/2020              Dakota Dori        [address]         Dear Mel Christianson,    This letter is to inform you that our office has made several attempts to reach you by phone without success.   We were attempting to co

## (undated) NOTE — MR AVS SNAPSHOT
[address]  150 [facility] [phone]               Thank you for choosing us for your health care visit with Avelino Carrel, PT.   We are glad to serve you and happy to provide you with this summa [place] Physical Therapy Visit By Therapist with Heaven Hatch PT   [facility] in [address] ([address])    [address] [id number] [phone]           Please arrive at your scheduled appointment time.   Wear co

## (undated) NOTE — MR AVS SNAPSHOT
[address]  150 [facility] [phone]               Thank you for choosing us for your health care visit with Niyah Layne PT.   We are glad to serve you and happy to provide you with this summa [place] Physical Therapy Visit By Therapist with Rhys Arcos, PT   [facility] Services in [address] ([address])    [address] [place] [address] [phone]           Please arrive at your scheduled appointment time.   Pierre 5. Patient to report no pain with daily activities. [employer]     Sign up for Neuralat, your secure online medical record. SynergEyes will allow you to access patient instructions from your recent visit,  view other health information, and more.  To sign Get your heart pumping – brisk walking, biking, swimming Even 10 minute increments are effective and add up over the week   2 ½ hours per week – spread out over time Use a maureen to keep you motivated   Don’t forget strength training with weights and resist

## (undated) NOTE — MR AVS SNAPSHOT
[address]  150 [facility] [phone]               Thank you for choosing us for your health care visit with Batool Izaguirre PT.   We are glad to serve you and happy to provide you with this summa [place] Physical Therapy Visit By Therapist with Lucille Pandey, PT   [facility] Services in [address] [address] ([address])    150 [facility] [phone]           Please arrive at your scheduled appointment time.   Pierre

## (undated) NOTE — MR AVS SNAPSHOT
[address]  150 [facility] [phone]               Thank you for choosing us for your health care visit with Monetta Dakins, PT.   We are glad to serve you and happy to provide you with this summa [place] Physical Therapy Visit By Therapist with Rhys Arcos PT   [facility] Services in [place] ([address] [facility],[address])    150 [facility] [phone]           Please arrive at your scheduled appointment time.   Pierre

## (undated) NOTE — MR AVS SNAPSHOT
[address]  [address] [place] [phone]               Thank you for choosing us for your health care visit with Roberto Knox PT.   We are glad to serve you and happy to provide you with this summary o [place] Physical Therapy Visit By Therapist with Roberto Knox PT   [facility] in [address] [place] [address] (3300 [facility],[address])    150 [facility] [phone]           Please arrive at your scheduled appointment time.   Wear co

## (undated) NOTE — LETTER
Dear Dr. Lilly Solis    This letter is to inform you that Beth Oliveros has been attending Physical Therapy with me. See below for my most recent plan of care. Diagnosis: Hip replacement L  Authorized # of Visits:  39       Next MD visit: none scheduled 3. Patient to be able to balance on L leg for 10 seconds or greater. 4. Increase in hip strength to 3/5 or greater to improve stability with daily activities.        •                       Plan: Discharge at this time to independent HEP

## (undated) NOTE — Clinical Note
Dear Dr. Ramona Urbina    This letter is to inform you that Dakota Meadows has been attending {THERAPY LIST:2549} with me. See below for my most recent plan of care.

## (undated) NOTE — MR AVS SNAPSHOT
[address]  [address] [place] [phone]               Thank you for choosing us for your health care visit with Homa Oliveira PT.   We are glad to serve you and happy to provide you with this summary o [place] Physical Therapy Visit By Therapist with Bethena Burkitt, PT   [facility] in [address] [place] [address] ([address] [facility],[address])    150 [facility] [phone]           Please arrive at your scheduled appointment time.   Wear co

## (undated) NOTE — MR AVS SNAPSHOT
[address]  150 [facility] [phone]               Thank you for choosing us for your health care visit with Lucille Pandey, YARA.   We are glad to serve you and happy to provide you with this summa [place] Physical Therapy Visit By Therapist with Siobhan Lake PT   [facility] Services in [address] [address] ([address] [facility],[address])    150 [facility] [phone]           Please arrive at your scheduled appointment time.   Pierre

## (undated) NOTE — Clinical Note
Dear Dr. Ranjana Mahmood    Patient Name: Corey Mcadams, : 10/10/1945, MRN: [id number]   Date:  2017  Referring Physician:  Ranjana Mahmood    Diagnosis: Hip replacement  Progress Summary    L THR   Authorized # of Visits:           Next MD wooten

## (undated) NOTE — MR AVS SNAPSHOT
[address]  150 [facility] [phone]               Thank you for choosing us for your health care visit with Javier Wayne PT.   We are glad to serve you and happy to provide you with this summa [place] Physical Therapy Visit By Therapist with Lavinia Perez PT   [facility] in [facility] ([address] [facility],[address])    150 [facility] [phone]           Please arrive at your scheduled appointment time.   Wear co

## (undated) NOTE — MR AVS SNAPSHOT
[address]  [address] [place] [phone]               Thank you for choosing us for your health care visit with Roland Brand PT.   We are glad to serve you and happy to provide you with this summary o Sign up for Loopt, your secure online medical record. Floqq will allow you to access patient instructions from your recent visit,  view other health information, and more. To sign up or find more information, go to [email or web address] and cl

## (undated) NOTE — MR AVS SNAPSHOT
[address]  150 [facility] [phone]               Thank you for choosing us for your health care visit with Kathi Rose PT.   We are glad to serve you and happy to provide you with this summa [place] Physical Therapy Visit By Therapist with Robby Hamilton PT   [facility] Services in [place] ([address] [facility],[address])    150 [facility] [phone]           Please arrive at your scheduled appointment time.   Pierre

## (undated) NOTE — LETTER
Dear Dr. Saroj Billy    This letter is to inform you that Adele Sullivan has been attending Physical Therapy with me. See below for my most recent plan of care.      Diagnosis: L hip/knee stiffness  Authorized # of Visits:  32   Next MD visit: none sched 3. Patient to be able to balance on L leg for 10 seconds or greater.-Not met  4. Increase in hip strength to 3/5 or greater to improve stability with daily activities.  - Not met            Plan: Discharge to independent HEP

## (undated) NOTE — LETTER
2025    To Whom It May Concern    This request is regarding our mutual patient, Javier Aaron,  10/10/1945.    In order to care for your patient at our cancer center, we require Javier Aaron's dentist to complete this form as indicated and fax it back to us at [place] location fax: [phone].  If you have any additional information or questions that may impact patient care, please call [phone] between the hours of 7702-0964 Mon-Fri.  We will be unable to treat your patient accordingly until we receive this request form.    Thank you,   Dominic Manzo MD    ______________________________________________________________________        Javier DOW Galen was seen by _________________________ on ____________ for a dental exam.     Javier Aaron is:    ____ ok to proceed with bisphosphonate treatment    ____ needs to have dental work done and is not cleared to receive bisphosphonate               treatment    ____ currently on cancer treatment and will need antibiotic due to mediport placement      If you have any questions, please call my office at ______________________________      Provider signature: __________________________________  Date: _______________

## (undated) NOTE — LETTER
November 29, 2021    [facility] [address]      Dear Mel Christianson: It was a pleasure speaking with you over the phone recently.  I wanted to send you my contact information for you to utilize when you have a question and o

## (undated) NOTE — MR AVS SNAPSHOT
[address]  150 [facility] [phone]               Thank you for choosing us for your health care visit with Nelson Willis PT.   We are glad to serve you and happy to provide you with this summa Enter your [employer] Activation Code exactly as it appears below along with your Zip Code and Date of Birth to complete the sign-up process. If you do not sign up before the expiration date, you must request a new code.     Your unique [employer] Access Code: [email or web address]

## (undated) NOTE — LETTER
03/15/21        [facility] [address] [facility]      Dear Jonas Petty,    [facility] records indicate that you have outstanding lab work and or testing that was ordered for you and has not yet been completed:  Orders Placed This Encounter

## (undated) NOTE — MR AVS SNAPSHOT
[address]  150 [facility] [phone]               Thank you for choosing us for your health care visit with Parth Marks PT.   We are glad to serve you and happy to provide you with this summa [place] Physical Therapy Visit By Therapist with Freddie Magallon PT   [facility] Services in [address] [address] ([address] [facility],[address])    150 [facility] [phone]           Please arrive at your scheduled appointment time.   Pierre

## (undated) NOTE — LETTER
12/30/2024          To Whom It May Concern:    Javier Aaron is currently under my medical care and will be having surgery on Tuesday 1/14/24, and her daughter, Niecy will need to be available to help care for her for 2 days following surgery.    If you require additional information please contact our office.      Sincerely,    Iris Armas MD          Document generated by:  Iris LARA RN

## (undated) NOTE — MR AVS SNAPSHOT
[address]  150 [facility] [phone]               Thank you for choosing us for your health care visit with Nelson Willis PT.   We are glad to serve you and happy to provide you with this summa [place] Physical Therapy Visit By Therapist with Parth Marks, YARA   [facility] Services in [address] [address] ([address] [facility],[address])    150 [facility] [phone]           Please arrive at your scheduled appointment time.   Pierre

## (undated) NOTE — MR AVS SNAPSHOT
[address]  [address] [place] [phone]               Thank you for choosing us for your health care visit with Tita Bryan PT.   We are glad to serve you and happy to provide you with this summary o [place] Physical Therapy Visit By Therapist with Zara Sanford, PT   [facility] Services in [address] [address] ([address]00 [facility],[address])    150 [facility] [phone]           Please arrive at your scheduled appointment time.   Pierre

## (undated) NOTE — MR AVS SNAPSHOT
[address]  150 [facility] [phone]               Thank you for choosing us for your health care visit with Alejandra Cantu PT.   We are glad to serve you and happy to provide you with this summa [place] Physical Therapy Visit By Therapist with Alejandra Cantu PT   [facility] Services in [address] [address] ([address]00 [facility],[address])    150 [facility] [phone]           Please arrive at your scheduled appointment time.   Pierre

## (undated) NOTE — MR AVS SNAPSHOT
[address]  150 [facility] [phone]               Thank you for choosing us for your health care visit with Nelson Willis PT.   We are glad to serve you and happy to provide you with this summa Allergies as of May 23, 2017     Clindamycin     Other reaction(s): CLINDAMYCIN    Penicillins Unknown                   Current Medications          This list is accurate as of: 5/23/17 12:08 PM.  Always use your most recent med list.                Sree Tirado Enter your CitalDoc Activation Code exactly as it appears below along with your Zip Code and Date of Birth to complete the sign-up process. If you do not sign up before the expiration date, you must request a new code.     Your unique CitalDoc Access Code: [email or web address]

## (undated) NOTE — MR AVS SNAPSHOT
[address]  150 [facility] [phone]               Thank you for choosing us for your health care visit with Chloe Cabrera PT.   We are glad to serve you and happy to provide you with this summa Sign up for Strobet, your secure online medical record. [employer] will allow you to access patient instructions from your recent visit,  view other health information, and more. To sign up or find more information, go to [email or web address] and cl